# Patient Record
Sex: FEMALE | Race: WHITE | NOT HISPANIC OR LATINO | URBAN - METROPOLITAN AREA
[De-identification: names, ages, dates, MRNs, and addresses within clinical notes are randomized per-mention and may not be internally consistent; named-entity substitution may affect disease eponyms.]

---

## 2017-02-17 ENCOUNTER — INPATIENT (INPATIENT)
Facility: HOSPITAL | Age: 82
LOS: 3 days | Discharge: ANOTHER HOSPITAL | End: 2017-02-21
Attending: PHYSICAL MEDICINE & REHABILITATION

## 2017-02-21 ENCOUNTER — INPATIENT (INPATIENT)
Facility: HOSPITAL | Age: 82
LOS: 5 days | Discharge: NURSING, REHAB OR LONG-TERM CARE | End: 2017-02-27
Attending: INTERNAL MEDICINE

## 2017-02-27 ENCOUNTER — INPATIENT (INPATIENT)
Facility: HOSPITAL | Age: 82
LOS: 8 days | Discharge: HOME HEALTH | End: 2017-03-08
Attending: PHYSICAL MEDICINE & REHABILITATION

## 2017-03-08 ENCOUNTER — MESSAGE (OUTPATIENT)
Age: 82
End: 2017-03-08

## 2017-03-21 ENCOUNTER — APPOINTMENT (OUTPATIENT)
Dept: HEMATOLOGY ONCOLOGY | Facility: CLINIC | Age: 82
End: 2017-03-21

## 2017-03-28 ENCOUNTER — RESULT REVIEW (OUTPATIENT)
Age: 82
End: 2017-03-28

## 2017-03-28 ENCOUNTER — APPOINTMENT (OUTPATIENT)
Dept: HEMATOLOGY ONCOLOGY | Facility: CLINIC | Age: 82
End: 2017-03-28

## 2017-03-28 VITALS
RESPIRATION RATE: 18 BRPM | BODY MASS INDEX: 27.6 KG/M2 | DIASTOLIC BLOOD PRESSURE: 63 MMHG | SYSTOLIC BLOOD PRESSURE: 164 MMHG | OXYGEN SATURATION: 97 % | TEMPERATURE: 95.9 F | HEIGHT: 62 IN | WEIGHT: 150 LBS | HEART RATE: 73 BPM

## 2017-03-28 DIAGNOSIS — Z78.9 OTHER SPECIFIED HEALTH STATUS: ICD-10-CM

## 2017-03-28 LAB
BASOPHILS # BLD: 0.02 TH/MM3
BASOPHILS NFR BLD: 0.3 %
EOSINOPHIL # BLD: 0.07 TH/MM3
EOSINOPHIL NFR BLD: 0.9 %
ERYTHROCYTE [DISTWIDTH] IN BLOOD BY AUTOMATED COUNT: 14.3 %
GRANULOCYTES # BLD: 5.02 TH/MM3
GRANULOCYTES NFR BLD: 68.1 %
HCT VFR BLD AUTO: 31.2 %
HGB BLD-MCNC: 9.6 G/DL
IMM GRANULOCYTES # BLD: 0.04 TH/MM3
IMM GRANULOCYTES NFR BLD: 0.5 %
LYMPHOCYTES # BLD: 1.14 TH/MM3
LYMPHOCYTES NFR BLD: 15.5 %
MCH RBC QN AUTO: 29.1 PG
MCHC RBC AUTO-ENTMCNC: 30.8 G/DL
MCV RBC AUTO: 94.5 FL
MONOCYTES # BLD: 1.08 TH/MM3
MONOCYTES NFR BLD: 14.7 %
PLATELET # BLD: 305 TH/MM3
PMV BLD AUTO: 9.6 FL
RBC # BLD AUTO: 3.3 MIL/MM3
WBC # BLD: 7.37 TH/MM3

## 2017-03-29 LAB
ANION GAP SERPL CALC-SCNC: 16 MEQ/L
BUN SERPL-MCNC: 34 MG/DL
BUN/CREAT SERPL: 21.1 %
CALCIUM SERPL-MCNC: 9.5 MG/DL
CHLORIDE SERPL-SCNC: 101 MEQ/L
CO2 SERPL-SCNC: 20 MEQ/L
CREAT SERPL-MCNC: 1.61 MG/DL
GFR SERPL CREATININE-BSD FRML MDRD: 31
GLUCOSE SERPL-MCNC: 57 MG/DL
POTASSIUM SERPL-SCNC: 4.7 MMOL/L
SODIUM SERPL-SCNC: 137 MEQ/L

## 2017-04-25 ENCOUNTER — APPOINTMENT (OUTPATIENT)
Dept: HEMATOLOGY ONCOLOGY | Facility: CLINIC | Age: 82
End: 2017-04-25

## 2017-04-25 VITALS
RESPIRATION RATE: 14 BRPM | BODY MASS INDEX: 28.71 KG/M2 | WEIGHT: 156 LBS | DIASTOLIC BLOOD PRESSURE: 65 MMHG | TEMPERATURE: 95.5 F | SYSTOLIC BLOOD PRESSURE: 137 MMHG | HEIGHT: 62 IN | HEART RATE: 68 BPM

## 2017-04-25 LAB
BASOPHILS # BLD: 0.04 TH/MM3
BASOPHILS NFR BLD: 0.5 %
EOSINOPHIL # BLD: 0.17 TH/MM3
EOSINOPHIL NFR BLD: 1.9 %
ERYTHROCYTE [DISTWIDTH] IN BLOOD BY AUTOMATED COUNT: 14.9 %
GRANULOCYTES # BLD: 5.87 TH/MM3
GRANULOCYTES NFR BLD: 67 %
HCT VFR BLD AUTO: 35.5 %
HGB BLD-MCNC: 10.9 G/DL
IMM GRANULOCYTES # BLD: 0.04 TH/MM3
IMM GRANULOCYTES NFR BLD: 0.5 %
LYMPHOCYTES # BLD: 1.39 TH/MM3
LYMPHOCYTES NFR BLD: 15.9 %
MCH RBC QN AUTO: 28.5 PG
MCHC RBC AUTO-ENTMCNC: 30.7 G/DL
MCV RBC AUTO: 92.9 FL
MONOCYTES # BLD: 1.24 TH/MM3
MONOCYTES NFR BLD: 14.2 %
PLATELET # BLD: 244 TH/MM3
PMV BLD AUTO: 9.9 FL
RBC # BLD AUTO: 3.82 MIL/MM3
WBC # BLD: 8.75 TH/MM3

## 2017-05-30 ENCOUNTER — APPOINTMENT (OUTPATIENT)
Dept: HEMATOLOGY ONCOLOGY | Facility: CLINIC | Age: 82
End: 2017-05-30

## 2017-05-30 ENCOUNTER — OUTPATIENT (OUTPATIENT)
Dept: OUTPATIENT SERVICES | Facility: HOSPITAL | Age: 82
LOS: 1 days | Discharge: HOME | End: 2017-05-30

## 2017-05-30 VITALS
WEIGHT: 153 LBS | HEART RATE: 59 BPM | HEIGHT: 62 IN | RESPIRATION RATE: 14 BRPM | DIASTOLIC BLOOD PRESSURE: 73 MMHG | OXYGEN SATURATION: 97 % | BODY MASS INDEX: 28.16 KG/M2 | SYSTOLIC BLOOD PRESSURE: 138 MMHG | TEMPERATURE: 96 F

## 2017-06-28 DIAGNOSIS — Z48.812 ENCOUNTER FOR SURGICAL AFTERCARE FOLLOWING SURGERY ON THE CIRCULATORY SYSTEM: ICD-10-CM

## 2017-06-28 DIAGNOSIS — G89.29 OTHER CHRONIC PAIN: ICD-10-CM

## 2017-06-28 DIAGNOSIS — R07.9 CHEST PAIN, UNSPECIFIED: ICD-10-CM

## 2017-06-28 DIAGNOSIS — G30.9 ALZHEIMER'S DISEASE, UNSPECIFIED: ICD-10-CM

## 2017-06-28 DIAGNOSIS — N17.9 ACUTE KIDNEY FAILURE, UNSPECIFIED: ICD-10-CM

## 2017-06-28 DIAGNOSIS — R53.81 OTHER MALAISE: ICD-10-CM

## 2017-06-28 DIAGNOSIS — I71.00 DISSECTION OF UNSPECIFIED SITE OF AORTA: ICD-10-CM

## 2017-06-28 DIAGNOSIS — I10 ESSENTIAL (PRIMARY) HYPERTENSION: ICD-10-CM

## 2017-06-28 DIAGNOSIS — N18.3 CHRONIC KIDNEY DISEASE, STAGE 3 (MODERATE): ICD-10-CM

## 2017-06-28 DIAGNOSIS — Z86.718 PERSONAL HISTORY OF OTHER VENOUS THROMBOSIS AND EMBOLISM: ICD-10-CM

## 2017-06-28 DIAGNOSIS — I48.0 PAROXYSMAL ATRIAL FIBRILLATION: ICD-10-CM

## 2017-06-28 DIAGNOSIS — E87.5 HYPERKALEMIA: ICD-10-CM

## 2017-06-28 DIAGNOSIS — M48.00 SPINAL STENOSIS, SITE UNSPECIFIED: ICD-10-CM

## 2017-06-28 DIAGNOSIS — Z79.01 LONG TERM (CURRENT) USE OF ANTICOAGULANTS: ICD-10-CM

## 2017-06-28 DIAGNOSIS — I12.9 HYPERTENSIVE CHRONIC KIDNEY DISEASE WITH STAGE 1 THROUGH STAGE 4 CHRONIC KIDNEY DISEASE, OR UNSPECIFIED CHRONIC KIDNEY DISEASE: ICD-10-CM

## 2017-06-28 DIAGNOSIS — M19.90 UNSPECIFIED OSTEOARTHRITIS, UNSPECIFIED SITE: ICD-10-CM

## 2017-06-28 DIAGNOSIS — D68.59 OTHER PRIMARY THROMBOPHILIA: ICD-10-CM

## 2017-06-28 DIAGNOSIS — F02.80 DEMENTIA IN OTHER DISEASES CLASSIFIED ELSEWHERE, UNSPECIFIED SEVERITY, WITHOUT BEHAVIORAL DISTURBANCE, PSYCHOTIC DISTURBANCE, MOOD DISTURBANCE, AND ANXIETY: ICD-10-CM

## 2017-06-28 DIAGNOSIS — I82.432 ACUTE EMBOLISM AND THROMBOSIS OF LEFT POPLITEAL VEIN: ICD-10-CM

## 2017-07-07 ENCOUNTER — OUTPATIENT (OUTPATIENT)
Dept: OUTPATIENT SERVICES | Facility: HOSPITAL | Age: 82
LOS: 1 days | Discharge: HOME | End: 2017-07-07

## 2017-07-07 DIAGNOSIS — I71.01 DISSECTION OF THORACIC AORTA: ICD-10-CM

## 2017-07-07 DIAGNOSIS — I16.0 HYPERTENSIVE URGENCY: ICD-10-CM

## 2017-07-07 DIAGNOSIS — R30.0 DYSURIA: ICD-10-CM

## 2017-07-07 DIAGNOSIS — I10 ESSENTIAL (PRIMARY) HYPERTENSION: ICD-10-CM

## 2017-07-07 DIAGNOSIS — I82.409 ACUTE EMBOLISM AND THROMBOSIS OF UNSPECIFIED DEEP VEINS OF UNSPECIFIED LOWER EXTREMITY: ICD-10-CM

## 2017-07-10 ENCOUNTER — OUTPATIENT (OUTPATIENT)
Dept: OUTPATIENT SERVICES | Facility: HOSPITAL | Age: 82
LOS: 1 days | Discharge: HOME | End: 2017-07-10

## 2017-07-10 DIAGNOSIS — I25.10 ATHEROSCLEROTIC HEART DISEASE OF NATIVE CORONARY ARTERY WITHOUT ANGINA PECTORIS: ICD-10-CM

## 2017-07-10 DIAGNOSIS — I71.01 DISSECTION OF THORACIC AORTA: ICD-10-CM

## 2017-07-10 DIAGNOSIS — I82.409 ACUTE EMBOLISM AND THROMBOSIS OF UNSPECIFIED DEEP VEINS OF UNSPECIFIED LOWER EXTREMITY: ICD-10-CM

## 2017-07-10 DIAGNOSIS — I16.0 HYPERTENSIVE URGENCY: ICD-10-CM

## 2017-07-10 DIAGNOSIS — I10 ESSENTIAL (PRIMARY) HYPERTENSION: ICD-10-CM

## 2017-08-01 ENCOUNTER — OUTPATIENT (OUTPATIENT)
Dept: OUTPATIENT SERVICES | Facility: HOSPITAL | Age: 82
LOS: 1 days | Discharge: HOME | End: 2017-08-01

## 2017-08-01 ENCOUNTER — APPOINTMENT (OUTPATIENT)
Dept: HEMATOLOGY ONCOLOGY | Facility: CLINIC | Age: 82
End: 2017-08-01
Payer: MEDICARE

## 2017-08-01 VITALS
HEART RATE: 59 BPM | TEMPERATURE: 95.8 F | RESPIRATION RATE: 16 BRPM | WEIGHT: 151.99 LBS | BODY MASS INDEX: 27.97 KG/M2 | HEIGHT: 62 IN | OXYGEN SATURATION: 99 % | DIASTOLIC BLOOD PRESSURE: 96 MMHG | SYSTOLIC BLOOD PRESSURE: 153 MMHG

## 2017-08-01 DIAGNOSIS — I10 ESSENTIAL (PRIMARY) HYPERTENSION: ICD-10-CM

## 2017-08-01 DIAGNOSIS — I16.0 HYPERTENSIVE URGENCY: ICD-10-CM

## 2017-08-01 DIAGNOSIS — I25.10 ATHEROSCLEROTIC HEART DISEASE OF NATIVE CORONARY ARTERY WITHOUT ANGINA PECTORIS: ICD-10-CM

## 2017-08-01 DIAGNOSIS — I82.432 ACUTE EMBOLISM AND THROMBOSIS OF LEFT POPLITEAL VEIN: ICD-10-CM

## 2017-08-01 DIAGNOSIS — I82.409 ACUTE EMBOLISM AND THROMBOSIS OF UNSPECIFIED DEEP VEINS OF UNSPECIFIED LOWER EXTREMITY: ICD-10-CM

## 2017-08-01 DIAGNOSIS — I71.01 DISSECTION OF THORACIC AORTA: ICD-10-CM

## 2017-08-01 PROCEDURE — 99215 OFFICE O/P EST HI 40 MIN: CPT

## 2017-08-02 DIAGNOSIS — I82.531 CHRONIC EMBOLISM AND THROMBOSIS OF RIGHT POPLITEAL VEIN: ICD-10-CM

## 2017-08-17 DIAGNOSIS — M54.5 LOW BACK PAIN: ICD-10-CM

## 2017-08-17 DIAGNOSIS — F02.80 DEMENTIA IN OTHER DISEASES CLASSIFIED ELSEWHERE, UNSPECIFIED SEVERITY, WITHOUT BEHAVIORAL DISTURBANCE, PSYCHOTIC DISTURBANCE, MOOD DISTURBANCE, AND ANXIETY: ICD-10-CM

## 2017-08-17 DIAGNOSIS — G30.9 ALZHEIMER'S DISEASE, UNSPECIFIED: ICD-10-CM

## 2017-08-17 DIAGNOSIS — I95.89 OTHER HYPOTENSION: ICD-10-CM

## 2017-08-17 DIAGNOSIS — K59.00 CONSTIPATION, UNSPECIFIED: ICD-10-CM

## 2017-08-17 DIAGNOSIS — R53.81 OTHER MALAISE: ICD-10-CM

## 2017-08-17 DIAGNOSIS — E78.5 HYPERLIPIDEMIA, UNSPECIFIED: ICD-10-CM

## 2017-08-17 DIAGNOSIS — M48.00 SPINAL STENOSIS, SITE UNSPECIFIED: ICD-10-CM

## 2017-08-17 DIAGNOSIS — N18.3 CHRONIC KIDNEY DISEASE, STAGE 3 (MODERATE): ICD-10-CM

## 2017-08-17 DIAGNOSIS — N39.0 URINARY TRACT INFECTION, SITE NOT SPECIFIED: ICD-10-CM

## 2017-08-17 DIAGNOSIS — I13.0 HYPERTENSIVE HEART AND CHRONIC KIDNEY DISEASE WITH HEART FAILURE AND STAGE 1 THROUGH STAGE 4 CHRONIC KIDNEY DISEASE, OR UNSPECIFIED CHRONIC KIDNEY DISEASE: ICD-10-CM

## 2017-08-17 DIAGNOSIS — F32.9 MAJOR DEPRESSIVE DISORDER, SINGLE EPISODE, UNSPECIFIED: ICD-10-CM

## 2017-08-17 DIAGNOSIS — G89.29 OTHER CHRONIC PAIN: ICD-10-CM

## 2017-08-17 DIAGNOSIS — M19.90 UNSPECIFIED OSTEOARTHRITIS, UNSPECIFIED SITE: ICD-10-CM

## 2017-08-17 DIAGNOSIS — I50.42 CHRONIC COMBINED SYSTOLIC (CONGESTIVE) AND DIASTOLIC (CONGESTIVE) HEART FAILURE: ICD-10-CM

## 2017-08-17 DIAGNOSIS — I71.00 DISSECTION OF UNSPECIFIED SITE OF AORTA: ICD-10-CM

## 2017-08-17 DIAGNOSIS — D64.9 ANEMIA, UNSPECIFIED: ICD-10-CM

## 2017-09-17 ENCOUNTER — INPATIENT (INPATIENT)
Facility: HOSPITAL | Age: 82
LOS: 1 days | Discharge: HOME | End: 2017-09-19
Attending: INTERNAL MEDICINE | Admitting: INTERNAL MEDICINE

## 2017-09-17 DIAGNOSIS — I82.409 ACUTE EMBOLISM AND THROMBOSIS OF UNSPECIFIED DEEP VEINS OF UNSPECIFIED LOWER EXTREMITY: ICD-10-CM

## 2017-09-17 DIAGNOSIS — I10 ESSENTIAL (PRIMARY) HYPERTENSION: ICD-10-CM

## 2017-09-17 DIAGNOSIS — I16.0 HYPERTENSIVE URGENCY: ICD-10-CM

## 2017-09-17 DIAGNOSIS — I71.01 DISSECTION OF THORACIC AORTA: ICD-10-CM

## 2017-09-22 DIAGNOSIS — M48.00 SPINAL STENOSIS, SITE UNSPECIFIED: ICD-10-CM

## 2017-09-22 DIAGNOSIS — I13.0 HYPERTENSIVE HEART AND CHRONIC KIDNEY DISEASE WITH HEART FAILURE AND STAGE 1 THROUGH STAGE 4 CHRONIC KIDNEY DISEASE, OR UNSPECIFIED CHRONIC KIDNEY DISEASE: ICD-10-CM

## 2017-09-22 DIAGNOSIS — F41.9 ANXIETY DISORDER, UNSPECIFIED: ICD-10-CM

## 2017-09-22 DIAGNOSIS — I10 ESSENTIAL (PRIMARY) HYPERTENSION: ICD-10-CM

## 2017-09-22 DIAGNOSIS — M54.9 DORSALGIA, UNSPECIFIED: ICD-10-CM

## 2017-09-22 DIAGNOSIS — F02.80 DEMENTIA IN OTHER DISEASES CLASSIFIED ELSEWHERE, UNSPECIFIED SEVERITY, WITHOUT BEHAVIORAL DISTURBANCE, PSYCHOTIC DISTURBANCE, MOOD DISTURBANCE, AND ANXIETY: ICD-10-CM

## 2017-09-22 DIAGNOSIS — Z79.01 LONG TERM (CURRENT) USE OF ANTICOAGULANTS: ICD-10-CM

## 2017-09-22 DIAGNOSIS — G30.9 ALZHEIMER'S DISEASE, UNSPECIFIED: ICD-10-CM

## 2017-09-22 DIAGNOSIS — I16.0 HYPERTENSIVE URGENCY: ICD-10-CM

## 2017-09-22 DIAGNOSIS — G89.29 OTHER CHRONIC PAIN: ICD-10-CM

## 2017-09-22 DIAGNOSIS — I71.01 DISSECTION OF THORACIC AORTA: ICD-10-CM

## 2017-09-22 DIAGNOSIS — D80.8 OTHER IMMUNODEFICIENCIES WITH PREDOMINANTLY ANTIBODY DEFECTS: ICD-10-CM

## 2017-09-22 DIAGNOSIS — N18.3 CHRONIC KIDNEY DISEASE, STAGE 3 (MODERATE): ICD-10-CM

## 2017-09-22 DIAGNOSIS — Z86.718 PERSONAL HISTORY OF OTHER VENOUS THROMBOSIS AND EMBOLISM: ICD-10-CM

## 2017-09-22 DIAGNOSIS — I44.0 ATRIOVENTRICULAR BLOCK, FIRST DEGREE: ICD-10-CM

## 2017-09-22 DIAGNOSIS — M19.90 UNSPECIFIED OSTEOARTHRITIS, UNSPECIFIED SITE: ICD-10-CM

## 2017-09-22 DIAGNOSIS — E78.5 HYPERLIPIDEMIA, UNSPECIFIED: ICD-10-CM

## 2017-09-22 DIAGNOSIS — I50.32 CHRONIC DIASTOLIC (CONGESTIVE) HEART FAILURE: ICD-10-CM

## 2017-09-28 DIAGNOSIS — Z53.29 PROCEDURE AND TREATMENT NOT CARRIED OUT BECAUSE OF PATIENT'S DECISION FOR OTHER REASONS: ICD-10-CM

## 2017-11-06 ENCOUNTER — OUTPATIENT (OUTPATIENT)
Dept: OUTPATIENT SERVICES | Facility: HOSPITAL | Age: 82
LOS: 1 days | Discharge: HOME | End: 2017-11-06

## 2017-11-06 DIAGNOSIS — I82.409 ACUTE EMBOLISM AND THROMBOSIS OF UNSPECIFIED DEEP VEINS OF UNSPECIFIED LOWER EXTREMITY: ICD-10-CM

## 2017-11-06 DIAGNOSIS — R52 PAIN, UNSPECIFIED: ICD-10-CM

## 2017-11-06 DIAGNOSIS — I10 ESSENTIAL (PRIMARY) HYPERTENSION: ICD-10-CM

## 2017-11-06 DIAGNOSIS — I16.0 HYPERTENSIVE URGENCY: ICD-10-CM

## 2017-11-06 DIAGNOSIS — I71.01 DISSECTION OF THORACIC AORTA: ICD-10-CM

## 2017-11-07 ENCOUNTER — APPOINTMENT (OUTPATIENT)
Dept: HEMATOLOGY ONCOLOGY | Facility: CLINIC | Age: 82
End: 2017-11-07
Payer: MEDICARE

## 2017-11-07 ENCOUNTER — RESULT REVIEW (OUTPATIENT)
Age: 82
End: 2017-11-07

## 2017-11-07 ENCOUNTER — OUTPATIENT (OUTPATIENT)
Dept: OUTPATIENT SERVICES | Facility: HOSPITAL | Age: 82
LOS: 1 days | Discharge: HOME | End: 2017-11-07

## 2017-11-07 VITALS
HEART RATE: 68 BPM | DIASTOLIC BLOOD PRESSURE: 52 MMHG | RESPIRATION RATE: 14 BRPM | BODY MASS INDEX: 27.97 KG/M2 | WEIGHT: 152 LBS | TEMPERATURE: 95.5 F | SYSTOLIC BLOOD PRESSURE: 104 MMHG | HEIGHT: 62 IN | OXYGEN SATURATION: 99 %

## 2017-11-07 PROCEDURE — 99214 OFFICE O/P EST MOD 30 MIN: CPT

## 2017-11-08 DIAGNOSIS — I82.531 CHRONIC EMBOLISM AND THROMBOSIS OF RIGHT POPLITEAL VEIN: ICD-10-CM

## 2017-12-21 ENCOUNTER — OUTPATIENT (OUTPATIENT)
Dept: OUTPATIENT SERVICES | Facility: HOSPITAL | Age: 82
LOS: 1 days | Discharge: HOME | End: 2017-12-21

## 2017-12-21 DIAGNOSIS — I71.01 DISSECTION OF THORACIC AORTA: ICD-10-CM

## 2017-12-21 DIAGNOSIS — I10 ESSENTIAL (PRIMARY) HYPERTENSION: ICD-10-CM

## 2017-12-21 DIAGNOSIS — I82.409 ACUTE EMBOLISM AND THROMBOSIS OF UNSPECIFIED DEEP VEINS OF UNSPECIFIED LOWER EXTREMITY: ICD-10-CM

## 2017-12-21 DIAGNOSIS — Z79.899 OTHER LONG TERM (CURRENT) DRUG THERAPY: ICD-10-CM

## 2017-12-21 DIAGNOSIS — I16.0 HYPERTENSIVE URGENCY: ICD-10-CM

## 2018-01-15 ENCOUNTER — OUTPATIENT (OUTPATIENT)
Dept: OUTPATIENT SERVICES | Facility: HOSPITAL | Age: 83
LOS: 1 days | Discharge: HOME | End: 2018-01-15

## 2018-01-15 DIAGNOSIS — R60.9 EDEMA, UNSPECIFIED: ICD-10-CM

## 2018-01-15 DIAGNOSIS — I16.0 HYPERTENSIVE URGENCY: ICD-10-CM

## 2018-01-15 DIAGNOSIS — I71.01 DISSECTION OF THORACIC AORTA: ICD-10-CM

## 2018-01-15 DIAGNOSIS — I10 ESSENTIAL (PRIMARY) HYPERTENSION: ICD-10-CM

## 2018-01-15 DIAGNOSIS — E78.4 OTHER HYPERLIPIDEMIA: ICD-10-CM

## 2018-01-15 DIAGNOSIS — I82.409 ACUTE EMBOLISM AND THROMBOSIS OF UNSPECIFIED DEEP VEINS OF UNSPECIFIED LOWER EXTREMITY: ICD-10-CM

## 2018-01-30 DIAGNOSIS — I16.0 HYPERTENSIVE URGENCY: ICD-10-CM

## 2018-01-30 DIAGNOSIS — I10 ESSENTIAL (PRIMARY) HYPERTENSION: ICD-10-CM

## 2018-01-30 DIAGNOSIS — I82.409 ACUTE EMBOLISM AND THROMBOSIS OF UNSPECIFIED DEEP VEINS OF UNSPECIFIED LOWER EXTREMITY: ICD-10-CM

## 2018-01-30 DIAGNOSIS — I71.01 DISSECTION OF THORACIC AORTA: ICD-10-CM

## 2018-02-13 ENCOUNTER — APPOINTMENT (OUTPATIENT)
Dept: HEMATOLOGY ONCOLOGY | Facility: CLINIC | Age: 83
End: 2018-02-13
Payer: MEDICARE

## 2018-02-13 ENCOUNTER — OUTPATIENT (OUTPATIENT)
Dept: OUTPATIENT SERVICES | Facility: HOSPITAL | Age: 83
LOS: 1 days | Discharge: HOME | End: 2018-02-13

## 2018-02-13 VITALS
SYSTOLIC BLOOD PRESSURE: 170 MMHG | WEIGHT: 157.98 LBS | DIASTOLIC BLOOD PRESSURE: 60 MMHG | HEIGHT: 62 IN | TEMPERATURE: 96.8 F | OXYGEN SATURATION: 98 % | BODY MASS INDEX: 29.07 KG/M2 | RESPIRATION RATE: 12 BRPM | HEART RATE: 70 BPM

## 2018-02-13 VITALS — DIASTOLIC BLOOD PRESSURE: 77 MMHG | SYSTOLIC BLOOD PRESSURE: 158 MMHG

## 2018-02-13 DIAGNOSIS — I82.531 CHRONIC EMBOLISM AND THROMBOSIS OF RIGHT POPLITEAL VEIN: ICD-10-CM

## 2018-02-13 DIAGNOSIS — I71.00 DISSECTION OF UNSPECIFIED SITE OF AORTA: ICD-10-CM

## 2018-02-13 PROCEDURE — 99215 OFFICE O/P EST HI 40 MIN: CPT

## 2018-02-14 DIAGNOSIS — I82.531 CHRONIC EMBOLISM AND THROMBOSIS OF RIGHT POPLITEAL VEIN: ICD-10-CM

## 2018-05-10 ENCOUNTER — OUTPATIENT (OUTPATIENT)
Dept: OUTPATIENT SERVICES | Facility: HOSPITAL | Age: 83
LOS: 1 days | Discharge: HOME | End: 2018-05-10

## 2018-05-10 DIAGNOSIS — I10 ESSENTIAL (PRIMARY) HYPERTENSION: ICD-10-CM

## 2018-05-10 DIAGNOSIS — E78.4 OTHER HYPERLIPIDEMIA: ICD-10-CM

## 2018-05-10 DIAGNOSIS — N18.3 CHRONIC KIDNEY DISEASE, STAGE 3 (MODERATE): ICD-10-CM

## 2018-05-13 ENCOUNTER — EMERGENCY (EMERGENCY)
Facility: HOSPITAL | Age: 83
LOS: 0 days | Discharge: HOME | End: 2018-05-13
Attending: EMERGENCY MEDICINE | Admitting: EMERGENCY MEDICINE

## 2018-05-13 VITALS
TEMPERATURE: 97 F | OXYGEN SATURATION: 97 % | RESPIRATION RATE: 18 BRPM | HEART RATE: 71 BPM | DIASTOLIC BLOOD PRESSURE: 78 MMHG | SYSTOLIC BLOOD PRESSURE: 143 MMHG

## 2018-05-13 VITALS
HEART RATE: 75 BPM | RESPIRATION RATE: 20 BRPM | SYSTOLIC BLOOD PRESSURE: 201 MMHG | DIASTOLIC BLOOD PRESSURE: 84 MMHG | TEMPERATURE: 98 F | OXYGEN SATURATION: 95 %

## 2018-05-13 DIAGNOSIS — R04.0 EPISTAXIS: ICD-10-CM

## 2018-05-13 DIAGNOSIS — I10 ESSENTIAL (PRIMARY) HYPERTENSION: ICD-10-CM

## 2018-05-13 RX ORDER — ACETAMINOPHEN 500 MG
650 TABLET ORAL ONCE
Qty: 0 | Refills: 0 | Status: COMPLETED | OUTPATIENT
Start: 2018-05-13 | End: 2018-05-13

## 2018-05-13 RX ORDER — TRANEXAMIC ACID 100 MG/ML
1 INJECTION, SOLUTION INTRAVENOUS ONCE
Qty: 0 | Refills: 0 | Status: COMPLETED | OUTPATIENT
Start: 2018-05-13 | End: 2018-05-13

## 2018-05-13 RX ADMIN — TRANEXAMIC ACID 1 APPLICATION(S): 100 INJECTION, SOLUTION INTRAVENOUS at 15:38

## 2018-05-13 RX ADMIN — Medication 650 MILLIGRAM(S): at 15:01

## 2018-05-13 NOTE — ED PROVIDER NOTE - ENMT, MLM
Airway patent, Right nare normal mucosa no bleeding, Left nare there is bleeding unable to visualize the site. Mouth with normal mucosa. Throat has no vesicles, no oropharyngeal exudates and uvula is midline.

## 2018-05-13 NOTE — ED PROVIDER NOTE - OBJECTIVE STATEMENT
86 yo F with hx of HTN, DVT on eliquis, aortic dissection medically managed, presents for evaluation of nose bleed from left nare, onset at 1230 hours, with no other symptoms. No dizziness, no chest pain, no back pain, no headache, no abdominal pain, no fever, no chills, no lightheadedness, no n/v/d. Patient and family states this has happened before and it stops. This time they tried ice and pressure with no resolution. Denies any other symptoms.

## 2018-05-13 NOTE — ED PROVIDER NOTE - ATTENDING CONTRIBUTION TO CARE
85yoF with h/o HTN and DVT on Eliquis, p/w epistaxis since this AM, has been having digital manipulation, took her AM BP meds. On exam, afebrile, hemodynamically stable, saturating well, NAD, well appearing, head NCAT, noted L nare slow epistaxis, EOMI grossly, MM dry, breathing comfortably on RA, CN's 3-12 grossly intact, skin warm, dry. Continued bleeding despite prolonged manual pressure, applied rhinorocket with TXA. 85yoF with h/o HTN and DVT on Eliquis, p/w epistaxis since this AM, has been having digital manipulation, took her AM BP meds. On exam, afebrile, hemodynamically stable, saturating well, NAD, well appearing, head NCAT, noted L nare slow epistaxis, EOMI grossly, MM dry, breathing comfortably on RA, CN's 3-12 grossly intact, skin warm, dry. Continued bleeding despite prolonged manual pressure, applied rhinorocket with TXA, removed after 2 hours with no rebleed, no oropharynx bleeding. Well appearing, hemodynamically stable. Discharged with instructions for PMD f/u.

## 2018-05-13 NOTE — ED PROVIDER NOTE - ENMT NEGATIVE STATEMENT, MLM
Ears: no ear pain and no hearing problems.Nose: + epistaxis from left nare, no nasal congestion and no nasal drainage.Mouth/Throat: no dysphagia, no hoarseness and no throat pain.Neck: no lumps, no pain, no stiffness and no swollen glands.

## 2018-05-17 ENCOUNTER — APPOINTMENT (OUTPATIENT)
Dept: OTOLARYNGOLOGY | Facility: CLINIC | Age: 83
End: 2018-05-17
Payer: MEDICARE

## 2018-05-17 DIAGNOSIS — H61.20 IMPACTED CERUMEN, UNSPECIFIED EAR: ICD-10-CM

## 2018-05-17 DIAGNOSIS — R04.0 EPISTAXIS: ICD-10-CM

## 2018-05-17 PROCEDURE — 99202 OFFICE O/P NEW SF 15 MIN: CPT | Mod: 25

## 2018-05-17 PROCEDURE — 69210 REMOVE IMPACTED EAR WAX UNI: CPT

## 2018-05-17 PROCEDURE — 99203 OFFICE O/P NEW LOW 30 MIN: CPT | Mod: 25

## 2018-05-17 PROCEDURE — 31231 NASAL ENDOSCOPY DX: CPT

## 2018-05-22 NOTE — ED ADULT NURSE NOTE - GASTROINTESTINAL ASSESSMENT
DISPLAY PLAN FREE TEXT DISPLAY PLAN FREE TEXT DISPLAY PLAN FREE TEXT DISPLAY PLAN FREE TEXT DISPLAY PLAN FREE TEXT WDL

## 2018-05-29 ENCOUNTER — MESSAGE (OUTPATIENT)
Age: 83
End: 2018-05-29

## 2018-06-01 ENCOUNTER — INPATIENT (INPATIENT)
Facility: HOSPITAL | Age: 83
LOS: 20 days | Discharge: HOME HEALTH | End: 2018-06-22
Attending: INTERNAL MEDICINE | Admitting: INTERNAL MEDICINE
Payer: COMMERCIAL

## 2018-06-01 VITALS
DIASTOLIC BLOOD PRESSURE: 87 MMHG | TEMPERATURE: 98 F | RESPIRATION RATE: 18 BRPM | HEART RATE: 76 BPM | OXYGEN SATURATION: 97 % | SYSTOLIC BLOOD PRESSURE: 170 MMHG

## 2018-06-01 DIAGNOSIS — Z86.718 PERSONAL HISTORY OF OTHER VENOUS THROMBOSIS AND EMBOLISM: ICD-10-CM

## 2018-06-01 DIAGNOSIS — I10 ESSENTIAL (PRIMARY) HYPERTENSION: ICD-10-CM

## 2018-06-01 DIAGNOSIS — E89.0 POSTPROCEDURAL HYPOTHYROIDISM: Chronic | ICD-10-CM

## 2018-06-01 DIAGNOSIS — E78.5 HYPERLIPIDEMIA, UNSPECIFIED: ICD-10-CM

## 2018-06-01 DIAGNOSIS — Z98.890 OTHER SPECIFIED POSTPROCEDURAL STATES: Chronic | ICD-10-CM

## 2018-06-01 DIAGNOSIS — Z87.39 PERSONAL HISTORY OF OTHER DISEASES OF THE MUSCULOSKELETAL SYSTEM AND CONNECTIVE TISSUE: Chronic | ICD-10-CM

## 2018-06-01 DIAGNOSIS — F03.90 UNSPECIFIED DEMENTIA WITHOUT BEHAVIORAL DISTURBANCE: ICD-10-CM

## 2018-06-01 DIAGNOSIS — I71.01 DISSECTION OF THORACIC AORTA: ICD-10-CM

## 2018-06-01 LAB
ALBUMIN SERPL ELPH-MCNC: 4 G/DL — SIGNIFICANT CHANGE UP (ref 3.5–5.2)
ALP SERPL-CCNC: 78 U/L — SIGNIFICANT CHANGE UP (ref 30–115)
ALT FLD-CCNC: 11 U/L — SIGNIFICANT CHANGE UP (ref 0–41)
ANION GAP SERPL CALC-SCNC: 19 MMOL/L — HIGH (ref 7–14)
APTT BLD: 27.5 SEC — SIGNIFICANT CHANGE UP (ref 27–39.2)
AST SERPL-CCNC: 21 U/L — SIGNIFICANT CHANGE UP (ref 0–41)
BASOPHILS # BLD AUTO: 0.02 K/UL — SIGNIFICANT CHANGE UP (ref 0–0.2)
BASOPHILS NFR BLD AUTO: 0.2 % — SIGNIFICANT CHANGE UP (ref 0–1)
BILIRUB SERPL-MCNC: 0.3 MG/DL — SIGNIFICANT CHANGE UP (ref 0.2–1.2)
BUN SERPL-MCNC: 36 MG/DL — HIGH (ref 10–20)
CALCIUM SERPL-MCNC: 9 MG/DL — SIGNIFICANT CHANGE UP (ref 8.5–10.1)
CHLORIDE SERPL-SCNC: 100 MMOL/L — SIGNIFICANT CHANGE UP (ref 98–110)
CK MB CFR SERPL CALC: 4.2 NG/ML — SIGNIFICANT CHANGE UP (ref 0.6–6.3)
CK SERPL-CCNC: 36 U/L — SIGNIFICANT CHANGE UP (ref 0–225)
CK SERPL-CCNC: 41 U/L — SIGNIFICANT CHANGE UP (ref 0–225)
CO2 SERPL-SCNC: 20 MMOL/L — SIGNIFICANT CHANGE UP (ref 17–32)
CREAT SERPL-MCNC: 1.2 MG/DL — SIGNIFICANT CHANGE UP (ref 0.7–1.5)
EOSINOPHIL # BLD AUTO: 0.1 K/UL — SIGNIFICANT CHANGE UP (ref 0–0.7)
EOSINOPHIL NFR BLD AUTO: 1.1 % — SIGNIFICANT CHANGE UP (ref 0–8)
GLUCOSE SERPL-MCNC: 110 MG/DL — HIGH (ref 70–99)
HCT VFR BLD CALC: 35.8 % — LOW (ref 37–47)
HGB BLD-MCNC: 11.3 G/DL — LOW (ref 12–16)
IMM GRANULOCYTES NFR BLD AUTO: 0.4 % — HIGH (ref 0.1–0.3)
INR BLD: 1.42 RATIO — HIGH (ref 0.65–1.3)
LYMPHOCYTES # BLD AUTO: 0.86 K/UL — LOW (ref 1.2–3.4)
LYMPHOCYTES # BLD AUTO: 9.2 % — LOW (ref 20.5–51.1)
MAGNESIUM SERPL-MCNC: 1.9 MG/DL — SIGNIFICANT CHANGE UP (ref 1.8–2.4)
MCHC RBC-ENTMCNC: 28.3 PG — SIGNIFICANT CHANGE UP (ref 27–31)
MCHC RBC-ENTMCNC: 31.6 G/DL — LOW (ref 32–37)
MCV RBC AUTO: 89.7 FL — SIGNIFICANT CHANGE UP (ref 81–99)
MONOCYTES # BLD AUTO: 0.72 K/UL — HIGH (ref 0.1–0.6)
MONOCYTES NFR BLD AUTO: 7.7 % — SIGNIFICANT CHANGE UP (ref 1.7–9.3)
NEUTROPHILS # BLD AUTO: 7.61 K/UL — HIGH (ref 1.4–6.5)
NEUTROPHILS NFR BLD AUTO: 81.4 % — HIGH (ref 42.2–75.2)
NRBC # BLD: 0 /100 WBCS — SIGNIFICANT CHANGE UP (ref 0–0)
NT-PROBNP SERPL-SCNC: 616 PG/ML — HIGH (ref 0–300)
PLATELET # BLD AUTO: 259 K/UL — SIGNIFICANT CHANGE UP (ref 130–400)
POTASSIUM SERPL-MCNC: 5.2 MMOL/L — HIGH (ref 3.5–5)
POTASSIUM SERPL-SCNC: 5.2 MMOL/L — HIGH (ref 3.5–5)
PROT SERPL-MCNC: 6.6 G/DL — SIGNIFICANT CHANGE UP (ref 6–8)
PROTHROM AB SERPL-ACNC: 15.5 SEC — HIGH (ref 9.95–12.87)
RBC # BLD: 3.99 M/UL — LOW (ref 4.2–5.4)
RBC # FLD: 13 % — SIGNIFICANT CHANGE UP (ref 11.5–14.5)
SODIUM SERPL-SCNC: 139 MMOL/L — SIGNIFICANT CHANGE UP (ref 135–146)
TROPONIN T SERPL-MCNC: 0.12 NG/ML — CRITICAL HIGH
TROPONIN T SERPL-MCNC: <0.01 NG/ML — SIGNIFICANT CHANGE UP
WBC # BLD: 9.35 K/UL — SIGNIFICANT CHANGE UP (ref 4.8–10.8)
WBC # FLD AUTO: 9.35 K/UL — SIGNIFICANT CHANGE UP (ref 4.8–10.8)

## 2018-06-01 RX ORDER — FUROSEMIDE 40 MG
20 TABLET ORAL
Qty: 0 | Refills: 0 | Status: DISCONTINUED | OUTPATIENT
Start: 2018-06-01 | End: 2018-06-13

## 2018-06-01 RX ORDER — LABETALOL HCL 100 MG
0.5 TABLET ORAL
Qty: 200 | Refills: 0 | Status: DISCONTINUED | OUTPATIENT
Start: 2018-06-01 | End: 2018-06-01

## 2018-06-01 RX ORDER — ATORVASTATIN CALCIUM 80 MG/1
80 TABLET, FILM COATED ORAL AT BEDTIME
Qty: 0 | Refills: 0 | Status: DISCONTINUED | OUTPATIENT
Start: 2018-06-01 | End: 2018-06-15

## 2018-06-01 RX ORDER — ESMOLOL HCL 100MG/10ML
25 VIAL (ML) INTRAVENOUS
Qty: 2500 | Refills: 0 | Status: DISCONTINUED | OUTPATIENT
Start: 2018-06-01 | End: 2018-06-01

## 2018-06-01 RX ORDER — ATORVASTATIN CALCIUM 80 MG/1
1 TABLET, FILM COATED ORAL
Qty: 0 | Refills: 0 | COMMUNITY

## 2018-06-01 RX ORDER — FOLIC ACID 0.8 MG
1 TABLET ORAL DAILY
Qty: 0 | Refills: 0 | Status: DISCONTINUED | OUTPATIENT
Start: 2018-06-01 | End: 2018-06-22

## 2018-06-01 RX ORDER — LISINOPRIL 2.5 MG/1
10 TABLET ORAL EVERY 12 HOURS
Qty: 0 | Refills: 0 | Status: DISCONTINUED | OUTPATIENT
Start: 2018-06-01 | End: 2018-06-01

## 2018-06-01 RX ORDER — ACETAMINOPHEN 500 MG
975 TABLET ORAL ONCE
Qty: 0 | Refills: 0 | Status: COMPLETED | OUTPATIENT
Start: 2018-06-01 | End: 2018-06-01

## 2018-06-01 RX ORDER — ATORVASTATIN CALCIUM 80 MG/1
10 TABLET, FILM COATED ORAL AT BEDTIME
Qty: 0 | Refills: 0 | Status: DISCONTINUED | OUTPATIENT
Start: 2018-06-01 | End: 2018-06-01

## 2018-06-01 RX ORDER — SODIUM CHLORIDE 9 MG/ML
1000 INJECTION INTRAMUSCULAR; INTRAVENOUS; SUBCUTANEOUS ONCE
Qty: 0 | Refills: 0 | Status: COMPLETED | OUTPATIENT
Start: 2018-06-01 | End: 2018-06-01

## 2018-06-01 RX ORDER — MEMANTINE HYDROCHLORIDE 10 MG/1
10 TABLET ORAL AT BEDTIME
Qty: 0 | Refills: 0 | Status: DISCONTINUED | OUTPATIENT
Start: 2018-06-01 | End: 2018-06-22

## 2018-06-01 RX ORDER — FUROSEMIDE 40 MG
1 TABLET ORAL
Qty: 0 | Refills: 0 | COMMUNITY

## 2018-06-01 RX ORDER — SODIUM CHLORIDE 9 MG/ML
500 INJECTION INTRAMUSCULAR; INTRAVENOUS; SUBCUTANEOUS ONCE
Qty: 0 | Refills: 0 | Status: COMPLETED | OUTPATIENT
Start: 2018-06-01 | End: 2018-06-01

## 2018-06-01 RX ORDER — LAMOTRIGINE 25 MG/1
100 TABLET, ORALLY DISINTEGRATING ORAL DAILY
Qty: 0 | Refills: 0 | Status: DISCONTINUED | OUTPATIENT
Start: 2018-06-01 | End: 2018-06-22

## 2018-06-01 RX ORDER — MEMANTINE HYDROCHLORIDE 10 MG/1
5 TABLET ORAL DAILY
Qty: 0 | Refills: 0 | Status: DISCONTINUED | OUTPATIENT
Start: 2018-06-01 | End: 2018-06-22

## 2018-06-01 RX ORDER — LISINOPRIL 2.5 MG/1
1 TABLET ORAL
Qty: 0 | Refills: 0 | COMMUNITY

## 2018-06-01 RX ORDER — LAMOTRIGINE 25 MG/1
1 TABLET, ORALLY DISINTEGRATING ORAL
Qty: 0 | Refills: 0 | COMMUNITY

## 2018-06-01 RX ORDER — LABETALOL HCL 100 MG
3 TABLET ORAL
Qty: 1000 | Refills: 0 | Status: DISCONTINUED | OUTPATIENT
Start: 2018-06-01 | End: 2018-06-02

## 2018-06-01 RX ORDER — LABETALOL HCL 100 MG
0.5 TABLET ORAL
Qty: 1000 | Refills: 0 | Status: DISCONTINUED | OUTPATIENT
Start: 2018-06-01 | End: 2018-06-01

## 2018-06-01 RX ORDER — MEMANTINE HYDROCHLORIDE 10 MG/1
1 TABLET ORAL
Qty: 0 | Refills: 0 | COMMUNITY

## 2018-06-01 RX ADMIN — SODIUM CHLORIDE 1000 MILLILITER(S): 9 INJECTION INTRAMUSCULAR; INTRAVENOUS; SUBCUTANEOUS at 09:26

## 2018-06-01 RX ADMIN — Medication 30 MG/MIN: at 20:01

## 2018-06-01 RX ADMIN — Medication 975 MILLIGRAM(S): at 12:20

## 2018-06-01 RX ADMIN — ATORVASTATIN CALCIUM 80 MILLIGRAM(S): 80 TABLET, FILM COATED ORAL at 21:44

## 2018-06-01 RX ADMIN — SODIUM CHLORIDE 750 MILLILITER(S): 9 INJECTION INTRAMUSCULAR; INTRAVENOUS; SUBCUTANEOUS at 12:24

## 2018-06-01 RX ADMIN — MEMANTINE HYDROCHLORIDE 10 MILLIGRAM(S): 10 TABLET ORAL at 21:44

## 2018-06-01 NOTE — ED PROVIDER NOTE - OBJECTIVE STATEMENT
85 year old female with pmhx of Aortic dissection, DVT on eliquis, HTN, CAD, and CKD, presents with right sided CP since this morning. Pain radiating down arm. No SOB, cough, fever, chills, calf pain or swelling. no abd pain

## 2018-06-01 NOTE — CONSULT NOTE ADULT - ASSESSMENT
84 yo female with significant PMHx for DVTs (on Eliquis), type B aortic dissection diagnosed 2/2017, as well as ascending aortic aneurysm 4.4 cm (unchanged from 2012), HTN, CAD, CKD GFR 32 (which prevented the pt to have a follow up CTA in past "unless necessary") who presented today with c/o right sided chest pain and tingling of the right arm/hand which started 6 AM today. On presentation to ED /78, pt received Lisinopril dose with SBP improving to 118. Cardiology evaluated and wants to treat pt conservatively. Upon evaluation, pt still c/o chest pain and right arm tingling.    - Please, obtain CTA chest for further evaluation    discussed with Dr. Blanc 86 yo female with significant PMHx for DVTs (on Eliquis), type B aortic dissection diagnosed 2/2017, as well as ascending aortic aneurysm 4.4 cm (unchanged from 2012), HTN, CAD, CKD GFR 32 (which prevented the pt to have a follow up CTA in past "unless necessary") who presented today with c/o right sided chest pain and tingling of the right arm/hand which started 6 AM today. On presentation to ED /78, pt received Lisinopril dose with SBP improving to 118. Cardiology evaluated and wants to treat pt conservatively. Upon evaluation, pt still c/o chest pain and right arm tingling.    - Please, obtain CTA chest for further evaluation    discussed with Dr. Blanc    14:35   CTA reviewed with Dr. Blanc    < from: CT Angio Chest Dissection Protocol (06.01.18 @ 13:42) >  Ascending thoracic aortic dissection and aneurysm with intramural   hematoma, progressed significantly worsened from the prior study of   February 2017.    < end of copied text >    Spoke with ED ANDREA Anne who updated me that CTS evaluation requested and pt is upgraded to critical care area in ED

## 2018-06-01 NOTE — ED PROVIDER NOTE - PHYSICAL EXAMINATION
CONST: Well appearing in NAD  EYES: PERRL, EOMI, Sclera and conjunctiva clear.   ENT: No nasal discharge. TM's clear B/L without drainage. Oropharynx normal appearing, no erythema or exudates. Uvula midline.  NECK: Non-tender, no meningeal signs  CARD: Normal S1 S2; Normal rate and rhythm  RESP: Equal BS B/L, No wheezes, rhonchi or rales. No distress  GI: Soft, non-tender, non-distended.  MS: Normal ROM in all extremities. No midline spinal tenderness. no calf tenderness or swelling, pulses 2 +   SKIN: Warm, dry, no acute rashes. Good turgor  NEURO: A&Ox3, No focal deficits. Strength 5/5 with no sensory deficits. Steady gait

## 2018-06-01 NOTE — H&P ADULT - NSHPPHYSICALEXAM_GEN_ALL_CORE
Height (cm): 154.94 (06-01-18 @ 09:11)  Weight (kg): 70 (06-01-18 @ 09:11)  BMI (kg/m2): 29.2 (06-01-18 @ 09:11)  BSA (m2): 1.69 (06-01-18 @ 09:11)  T(F): 97.5 (06-01-18 @ 08:20), Max: 97.5 (06-01-18 @ 08:20)  HR: 76 (06-01-18 @ 08:20) (76 - 76)  BP: 170/87 (06-01-18 @ 08:20) (170/87 - 170/87)  RR: 18 (06-01-18 @ 08:20) (18 - 18)  SpO2: 97% (06-01-18 @ 08:20) (97% - 97%)
Height (cm): 154.94 (06-01-18 @ 09:11)  Weight (kg): 70 (06-01-18 @ 14:54)  BMI (kg/m2): 29.2 (06-01-18 @ 14:54)  BSA (m2): 1.69 (06-01-18 @ 14:54)  T(F): 97.8 (06-01-18 @ 14:54), Max: 97.8 (06-01-18 @ 14:54)  HR: 72 (06-01-18 @ 15:14) (66 - 76)  BP: 116/70 (06-01-18 @ 15:14) (105/54 - 170/87)  RR: 18 (06-01-18 @ 15:14) (18 - 18)  SpO2: 98% (06-01-18 @ 15:14) (97% - 99%)    Physical Exam:  General: Not in distress.   HEENT: Moist mucus membranes. PERRLA.  Cardio: Regular rate and rhythm, S1, S2, no murmur, rub, or gallop.  Pulm: Clear to auscultation bilaterally. No wheezing, rales, or rhonchi.  Abdomen: Soft, non-tender, non-distended. Normoactive bowel sounds.  Extremities: No cyanosis or edema bilaterally. No calf tenderness to palpation.  Neuro: A&O x3. No focal deficits. CN II - XII grossly intact.

## 2018-06-01 NOTE — H&P ADULT - NSHPLABSRESULTS_GEN_ALL_CORE
CBC Full  -  ( 01 Jun 2018 09:12 )  WBC Count : 9.35 K/uL  Hemoglobin : 11.3 g/dL  Hematocrit : 35.8 %  Platelet Count - Automated : 259 K/uL  Mean Cell Volume : 89.7 fL  Mean Cell Hemoglobin : 28.3 pg  Mean Cell Hemoglobin Concentration : 31.6 g/dL  Auto Neutrophil % : 81.4 %  Auto Lymphocyte % : 9.2 %  Auto Monocyte % : 7.7 %  Auto Eosinophil % : 1.1 %  Auto Basophil % : 0.2 %    BMP: 06-01-18 @ 09:12  139 | 100 | 36   -----------------< 110  5.2  | 20 | 1.2  eGFR(AA): 48, eGFR (non-AA): 41  Ca 9.0, Mg 1.9, P --    LFTs: 06-01-18 @ 09:12  TP  6.6  | 4.0 Alb   ---------------  TB  0.3  | --  DB   ---------------  ALT 11  | 21  AST            ^          78  ALK    PT/INR/PTT: 06-01-18 @ 09:12  15.50 | 27.5        ^      1.42    Cardiac Enzymes: 06-01-18 @ 09:12  Trop T: <0.01  Trop I:   CKMB:    CK: 41    Serum Pro-Brain Natriuretic Peptide (06.01.18 @ 09:12)    Serum Pro-Brain Natriuretic Peptide: 616 pg/mL    CXR: No acute cardiopulmonary disease    CT Angio Chest Dissection Protocol (06.01.18 @ 13:42):  Ascending thoracic aortic dissection and aneurysm with intramural hematoma, progressed significantly worsened from the prior study of February 2017.    12 Lead ECG (06.01.18 @ 09:43):  Sinus rhythm with 1st degree A-V block with Premature atrial complexes with aberrant conduction CBC Full  -  ( 01 Jun 2018 09:12 )  WBC Count : 9.35 K/uL  Hemoglobin : 11.3 g/dL  Hematocrit : 35.8 %  Platelet Count - Automated : 259 K/uL  Mean Cell Volume : 89.7 fL  Mean Cell Hemoglobin : 28.3 pg  Mean Cell Hemoglobin Concentration : 31.6 g/dL  Auto Neutrophil % : 81.4 %  Auto Lymphocyte % : 9.2 %  Auto Monocyte % : 7.7 %  Auto Eosinophil % : 1.1 %  Auto Basophil % : 0.2 %    BMP: 06-01-18 @ 09:12  139 | 100 | 36   -----------------< 110  5.2  | 20 | 1.2  eGFR(AA): 48, eGFR (non-AA): 41  Ca 9.0, Mg 1.9, P --    LFTs: 06-01-18 @ 09:12  TP  6.6  | 4.0 Alb   ---------------  TB  0.3  | --  DB   ---------------  ALT 11  | 21  AST            ^          78  ALK    PT/INR/PTT: 06-01-18 @ 09:12  15.50 | 27.5        ^      1.42    Cardiac Enzymes: 06-01-18 @ 09:12  Trop T: <0.01  Trop I:   CKMB:    CK: 41    Serum Pro-Brain Natriuretic Peptide (06.01.18 @ 09:12)    Serum Pro-Brain Natriuretic Peptide: 616 pg/mL    CXR: No acute cardiopulmonary disease    < from: CT Angio Chest Dissection Protocol (06.01.18 @ 13:42) >    INTERPRETATION:  Clinical History / Reason for exam: Dissection.    CT angiogram was performed from the apices down to the aortic bifurcation   before and after the administration of intravenous contrast. 100 cc of   low osmolar nonionic contrast was utilized. Coronal and sagittal rendered   images were obtained. Comparison is made with a study dated 3/4/2017.    Noncontrast studies demonstrates presence of intramuralhematoma within   the ascending aorta as well as the proximal descending aorta.   Postcontrast reveals expansion of the previously seen descending aortic   dissection to this time to an ascending aortic dissection and hematoma,   therefore type a. The size of the ascending aorta including the   dissection and hematoma measures 5.4 cm x 5.2 cm. This hematoma extends   down to the aortic root near the sinus of Valsalva. There is   atherosclerosis of the coronary vasculature. The descending thoracic  aorta itself reveals atherosclerotic change without extension of the   dissection flap past the proximal descending aorta. At the diaphragmatic   hiatus, the aorta measures 2.5 cm. There is atherosclerotic change seen.   There is atherosclerotic change of the branching vasculature.     The heart size itself is normal. There is no pericardial effusion.    The tracheobronchial tree is patent. There is no change in the appearance   of the thyroid gland.    Fibrotic change at the lung bases is seen.    Evaluation of the upper abdomen reveals unchanged appearance of the   kidneys, liver, spleen, and gallbladder. The pancreas is within normal   limits.    There are degenerative changes of the spine.    Impression:    Ascending thoracic aortic dissection and aneurysm with intramural   hematoma, progressed significantly worsened from the prior study of   February 2017.    < end of copied text >        12 Lead ECG (06.01.18 @ 09:43):  Sinus rhythm with 1st degree A-V block with Premature atrial complexes with aberrant conduction

## 2018-06-01 NOTE — H&P ADULT - ATTENDING COMMENTS
86 y/o F with PMH of aortic aneurysm (diagnosed February 2017), DVT (diagnosed March, 2017) on eliquis, HLD, HTN, and dementia presented for acute onset of right chest pain, jaw pain, and R. arm paresthesias found to have worsening of aortic dissection now reaching the ascending aorta therefore type A.      1.  Ascending aortic dissection    type A    CT surgery eval     A-line placed     On labetalol for a target SBP of 110-130 and HR of around 60 as per Dr. Bradley's recommendations      Check 2d echo, trend cardiac enzymes, will increase lipitor to 80mg qhs.      2. DVT      Hold Eliquis in view of active dissection.     3 HTN      Hold Lisinopril now on labetalol     4.  Dementia      On Namenda.      5. HLD      On lipitor

## 2018-06-01 NOTE — ED PROCEDURE NOTE - CPROC ED ARTER LINE DETAIL1
Using sterile technique, the correct location was identified, and a needle was inserted into the artery (specify in FT).
Positive blood return was obtained via the catheter./Hemostasis was achieved with direct pressure, and a dry sterile dressing applied./Using sterile technique, the correct location was identified, and a needle was inserted into the artery (specify in FT)./Connected to a pressurized flush line./Line was sutured in place.

## 2018-06-01 NOTE — ED PROVIDER NOTE - PROGRESS NOTE DETAILS
pt now reports right sided jaw pain.  repeat ekg ordered pt no distress, normal smile, no signs of cva repeat unchanged repeat ekg unchanged pt with persistent pain, cr 1.2, pt has received iv hydration. shared decision making with family. will pursue CT angio to evaluate for aortic dissection. pt is on eliquis, unlikely PE, no tachypnea, tachycardia. vascular consulted for inpatient consult after pt was admitted pt was seen by vascular. family changed mind and willing to allow CT dissection to be done. CT study positive. pt in no distress, vascular made aware. inpatient team MAR notified. CT surgery pa notified ct surgery pa has seen pt. [facility] pt signed out to Dr. Domínguez.  medicine team aware and pt is no longer admitted. ed team will resume care. coordinatge care with vascular and ct surgery CT surgery and patient agree to medical management, requesting A line, and labetolol drip. cardiac fellow at bedside, requesting cardiac echo.. pt approved for CCU, cancelled medicine admission, and endorsed to ICU resident.

## 2018-06-01 NOTE — CONSULT NOTE ADULT - ASSESSMENT
CTS ATTENDING    PATIENT EXAMINED IN ED  FAMILY WELL AWARE OF THE PATIENT'S DIAGNOSIS OF ACUTE TYPE A AORTIC DISSECTION  PATIENT HAS LIMITED FUNCTIONAL CAPACITY AND IS OF ADVANCED AGE AND COMORBIDITY PROFILE  SHE APPEARS WEAK, FRAIL AND MALNOURISHED, AND UNLIKELY TO RECOVER FROM A MAJOR OPERATION  I HAD A LONG DISCUSSION WITH THE DAUGHTERS AND   THE FAMILY WAS NOT IN FAVOR OF UNDERTAKING SURGICAL REPAIR AND I DID NOT ADVISE SURGERY  I RECOMMENDED PALLIATIVE CARE CONSULTATION AND SERIOUS CONSIDERATION TO HOSPICE, DNR-DNI STATUS AND COMFORT MEASURES  THE HIGH LIKELIHOOD OF MORTALITY WITHIN HOURS TO DAYS WAS ALSO GIVEN TO THE PATIENT, AS WELL AS THE ALTERNATIVE OF PROCEEDING WITH SURGICAL REPAIR, WHICH WOULD INCUR SIGNIFICANT RISK OF DEATH, RENAL FAILURE, DIALYSIS, RESPIRATORY FAILURE AND STROKE, WITH ALMOST NO PROBABILITY FOR RETURNING TO HER PRESENT LEVEL OF AWARENESS ANDFUNCTION.    THE FAMILY WAS IN THE PROCESS OF COLLECTIVELY MAKING A DECISION REGARDING FURTHER CARE.  NO SURGERY WAS RECOMMENDED.

## 2018-06-01 NOTE — H&P ADULT - PROBLEM SELECTOR PLAN 1
Acute thoracic aortic dissection worsened from type B now reached the ascending aorta therefore type A  Patient's family now acquiring different opinions from CT surgeons regarding appropriate management. A-line placed, On labetalol for a target SBP of 110-130 and HR of around 60 as per Dr. Bradley's recommendations.  Check 2d echo, trend cardiac enzymes. Acute thoracic aortic dissection worsened from type B now reached the ascending aorta therefore type A  Patient's family now acquiring different opinions from CT surgeons regarding appropriate management. A-line placed, On labetalol for a target SBP of 110-130 and HR of around 60 as per Dr. Bradley's recommendations.  Check 2d echo, trend cardiac enzymes, will increase lipitor to 80mg qhs

## 2018-06-01 NOTE — H&P ADULT - HISTORY OF PRESENT ILLNESS
84 y/o F with PMH of ... , DVT on eliquis, HTN, presented for acute onset of right chest pain, jaw pain, and R. arm paresthesias. She states she woke up around 6am the day of presentation with the pain. It feels like a tightness in her chest and a numbness/tingling sensation traveling from her right shoulder down the entire arm to her fingers. Symptoms persisted since onset and are still present at the time of admission. Vascular surgery was consulted in the ED, who recommended a CTA to look for aortic dissection. The test came back positive for ascending thoracic aortic dissection and aneurysm with intramural hematoma significantly worsened from the prior study of February 2017.  The patient refused surgical intervention, so she was admitted to the medicine service for medical management. 86 y/o F with PMH of aortic aneurysm, type b dissection (diagnosed February 2017), DVT (diagnosed March, 2017) on eliquis, and HTN presented for acute onset of right chest pain, jaw pain, and R. arm paresthesias. She states she woke up around 6am the day of presentation with the pain. It feels like a tightness in her chest and a numbness/tingling sensation traveling from her right shoulder down the entire arm to her fingers. Symptoms persisted since onset and are still present at the time of admission. Vascular surgery was consulted in the ED, who recommended a CTA to look for aortic dissection. The test came back positive for ascending thoracic aortic dissection and aneurysm with intramural hematoma significantly worsened from the prior study of February 2017.  The patient refused surgical intervention, so she was admitted to the medicine service for medical management. 86 y/o F with PMH of aortic aneurysm (diagnosed February 2017), DVT (diagnosed March, 2017) on eliquis, HTN, HLD, and dementia presented for acute onset of right chest pain, jaw pain, and R. arm paresthesias. She states she woke up around 6am the day of presentation with the pain. It feels like a tightness in her chest and a numbness/tingling sensation traveling from her right shoulder down the entire arm to her fingers. Symptoms persisted since onset and are still present at the time of admission. Vascular surgery was consulted in the ED, who recommended a CTA to look for aortic dissection. The test came back positive for ascending thoracic aortic dissection and aneurysm with intramural hematoma significantly worsened from the prior study of February 2017.  The patient refused surgical intervention, so she was admitted to the medicine service for medical management. 86 y/o F with PMH of aortic aneurysm (diagnosed February 2017), DVT (diagnosed March, 2017) on eliquis, HTN, HLD, and dementia presented for acute onset of right chest pain, jaw pain, and R. arm paresthesias. She states she woke up around 6am the day of presentation with the pain. It feels like a tightness in her chest and a numbness/tingling sensation traveling from her right shoulder down the entire arm to her fingers. Symptoms persisted since onset and are still present at the time of admission. Vascular surgery was consulted in the ED, who recommended a CTA to look for aortic dissection. The test came back positive for ascending thoracic aortic dissection and aneurysm with intramural hematoma significantly worsened from the prior study of February 2017.  As per ED, discussion between CT surgery team and family ended in opting out of a surgical intervention so she was admitted to the Coronary Care Unit for medical management.

## 2018-06-01 NOTE — H&P ADULT - PMH
Dissecting aneurysm of anterior cerebral artery    History of deep vein thrombosis    Hypertension, unspecified type Dementia    Dyslipidemia    History of deep vein thrombosis    Hypertension, unspecified type    Thoracic aortic dissection

## 2018-06-01 NOTE — H&P ADULT - NSHPLABSRESULTS_GEN_ALL_CORE
CBC Full  -  ( 01 Jun 2018 09:12 )  WBC Count : 9.35 K/uL  Hemoglobin : 11.3 g/dL  Hematocrit : 35.8 %  Platelet Count - Automated : 259 K/uL  Mean Cell Volume : 89.7 fL  Mean Cell Hemoglobin : 28.3 pg  Mean Cell Hemoglobin Concentration : 31.6 g/dL  Auto Neutrophil % : 81.4 %  Auto Lymphocyte % : 9.2 %  Auto Monocyte % : 7.7 %  Auto Eosinophil % : 1.1 %  Auto Basophil % : 0.2 %    BMP: 06-01-18 @ 09:12  139 | 100 | 36   -----------------< 110  5.2  | 20 | 1.2  eGFR(AA): 48, eGFR (non-AA): 41  Ca 9.0, Mg 1.9, P --    LFTs: 06-01-18 @ 09:12  TP  6.6  | 4.0 Alb   ---------------  TB  0.3  | --  DB   ---------------  ALT 11  | 21  AST            ^          78  ALK    PT/INR/PTT: 06-01-18 @ 09:12  15.50 | 27.5        ^      1.42    Cardiac Enzymes: 06-01-18 @ 09:12  Trop T: <0.01  Trop I:   CKMB:    CK: 41    Serum Pro-Brain Natriuretic Peptide (06.01.18 @ 09:12)    Serum Pro-Brain Natriuretic Peptide: 616 pg/mL    CXR:

## 2018-06-01 NOTE — ED ADULT NURSE NOTE - OBJECTIVE STATEMENT
pt with midsternal chest pain starting this morning upon waking up. pt with sob at times. pt with hx of dissection.

## 2018-06-01 NOTE — H&P ADULT - ASSESSMENT
84 y/o F with PMH of aortic aneurysm (diagnosed February 2017), DVT (diagnosed March, 2017) on eliquis, HLD, HTN, and dementia presented for acute onset of right chest pain, jaw pain, and R. arm paresthesias. Found to have acute aortic dissection.    1.) Acute thoracic aortic dissection    HTN / HLD    History of DVT    - Hold eliquis    Dementia: 86 y/o F with PMH of aortic aneurysm (diagnosed February 2017), DVT (diagnosed March, 2017) on eliquis, HLD, HTN, and dementia presented for acute onset of right chest pain, jaw pain, and R. arm paresthesias found to have worsening of aortic dissection now reaching the ascending aorta therefore type A.

## 2018-06-01 NOTE — H&P ADULT - NSHPSOCIALHISTORY_GEN_ALL_CORE
Social History:    Marital Status:  ( x )    (   ) Single    (   )    (  )     Substance Use (street drugs): ( x ) never used  (  ) other:  Tobacco Usage:  ( x ) never smoked   (   ) former smoker   (   ) current smoker  (     ) pack year  (        ) last cigarette date  Alcohol Usage: Denies

## 2018-06-01 NOTE — ED PROVIDER NOTE - MEDICAL DECISION MAKING DETAILS
extensive d/w family and Dr. Delgado - they do not want sx at this time, will dw palliative care and stay admitted for further evaluation.

## 2018-06-01 NOTE — H&P ADULT - PSH
No significant past surgical history H/O partial thyroidectomy    H/O rotator cuff tear    Status post hip surgery

## 2018-06-01 NOTE — H&P ADULT - NSHPOUTPATIENTPROVIDERS_GEN_ALL_CORE
PCP: Dr. Corcoran  Cardiology: Dr. Giron  Hematology: Dr. Daly ([place])  Nephrology: Dr. Kleiner

## 2018-06-01 NOTE — ED PROVIDER NOTE - ATTENDING CONTRIBUTION TO CARE
85 yr old f hx of aortic dissection, dvt on eliquis, ckd, here for eval of right sided chest pain. pt reports feeling tingling in right chest. pain not radiating, not tearing, stabbibg, associated with diaphoresis, n/v/arm pain. pt on exam no distress, s1s2 ctab soft nt/nd, 2+ radial pulse bl, no calf swelling. impression, atypical chest pain, with reported hx of contrast induced nephropathy and low suspicion for aortic pathology decision to wait for cr before ct. cr returned and borderline gfr, shared decision making done and plan for ct angio. afterwards pt was seen by cardio in ed, after discussion with family preferred MRI even though would not be obtained for possibly atleast one day, bp control, admit to tele. vascular was consulted and saw pt at time of admission.

## 2018-06-01 NOTE — ED PROVIDER NOTE - NS ED ROS FT
Review of Systems:  	•	CONSTITUTIONAL - no fever, no diaphoresis, no chills  	Skin: no skin rash   	•	EYES - no eye pain, no blurry vision  	•	ENT - no change in hearing, no sore throat, no ear pain or tinnitus  	•	RESPIRATORY - no shortness of breath, no cough  	•	CARDIAC - + CP  	•	GI - no abd pain, no nausea, no vomiting, no diarrhea, no constipation  	•	GENITO-URINARY - no discharge, no dysuria; no hematuria, no increased urinary frequency  	•	MUSCULOSKELETAL - no joint paint, no swelling, no redness  	•	NEUROLOGIC - no weakness, no headache, no paresthesias, no LOC  	•

## 2018-06-01 NOTE — ED PROVIDER NOTE - CRITICAL CARE PROVIDED
direct patient care (not related to procedure)/interpretation of diagnostic studies/documentation/conducted a detailed discussion of DNR status/consultation with other physicians/additional history taking

## 2018-06-02 LAB
ANION GAP SERPL CALC-SCNC: 17 MMOL/L — HIGH (ref 7–14)
BASOPHILS # BLD AUTO: 0.01 K/UL — SIGNIFICANT CHANGE UP (ref 0–0.2)
BASOPHILS NFR BLD AUTO: 0.1 % — SIGNIFICANT CHANGE UP (ref 0–1)
BUN SERPL-MCNC: 30 MG/DL — HIGH (ref 10–20)
CALCIUM SERPL-MCNC: 8.3 MG/DL — LOW (ref 8.5–10.1)
CHLORIDE SERPL-SCNC: 106 MMOL/L — SIGNIFICANT CHANGE UP (ref 98–110)
CHOLEST SERPL-MCNC: 120 MG/DL — SIGNIFICANT CHANGE UP (ref 100–200)
CK MB CFR SERPL CALC: 3.7 NG/ML — SIGNIFICANT CHANGE UP (ref 0.6–6.3)
CK SERPL-CCNC: 41 U/L — SIGNIFICANT CHANGE UP (ref 0–225)
CO2 SERPL-SCNC: 18 MMOL/L — SIGNIFICANT CHANGE UP (ref 17–32)
CREAT SERPL-MCNC: 1.2 MG/DL — SIGNIFICANT CHANGE UP (ref 0.7–1.5)
EOSINOPHIL # BLD AUTO: 0 K/UL — SIGNIFICANT CHANGE UP (ref 0–0.7)
EOSINOPHIL NFR BLD AUTO: 0 % — SIGNIFICANT CHANGE UP (ref 0–8)
GLUCOSE SERPL-MCNC: 126 MG/DL — HIGH (ref 70–99)
HCT VFR BLD CALC: 28 % — LOW (ref 37–47)
HDLC SERPL-MCNC: 40 MG/DL — SIGNIFICANT CHANGE UP (ref 40–125)
HGB BLD-MCNC: 8.9 G/DL — LOW (ref 12–16)
IMM GRANULOCYTES NFR BLD AUTO: 0.5 % — HIGH (ref 0.1–0.3)
LIPID PNL WITH DIRECT LDL SERPL: 60 MG/DL — SIGNIFICANT CHANGE UP (ref 4–129)
LYMPHOCYTES # BLD AUTO: 0.69 K/UL — LOW (ref 1.2–3.4)
LYMPHOCYTES # BLD AUTO: 6.9 % — LOW (ref 20.5–51.1)
MAGNESIUM SERPL-MCNC: 1.7 MG/DL — LOW (ref 1.8–2.4)
MCHC RBC-ENTMCNC: 28.6 PG — SIGNIFICANT CHANGE UP (ref 27–31)
MCHC RBC-ENTMCNC: 31.8 G/DL — LOW (ref 32–37)
MCV RBC AUTO: 90 FL — SIGNIFICANT CHANGE UP (ref 81–99)
MONOCYTES # BLD AUTO: 1.25 K/UL — HIGH (ref 0.1–0.6)
MONOCYTES NFR BLD AUTO: 12.4 % — HIGH (ref 1.7–9.3)
NEUTROPHILS # BLD AUTO: 8.07 K/UL — HIGH (ref 1.4–6.5)
NEUTROPHILS NFR BLD AUTO: 80.1 % — HIGH (ref 42.2–75.2)
PLATELET # BLD AUTO: 205 K/UL — SIGNIFICANT CHANGE UP (ref 130–400)
POTASSIUM SERPL-MCNC: 4.3 MMOL/L — SIGNIFICANT CHANGE UP (ref 3.5–5)
POTASSIUM SERPL-SCNC: 4.3 MMOL/L — SIGNIFICANT CHANGE UP (ref 3.5–5)
RBC # BLD: 3.11 M/UL — LOW (ref 4.2–5.4)
RBC # FLD: 13.3 % — SIGNIFICANT CHANGE UP (ref 11.5–14.5)
SODIUM SERPL-SCNC: 141 MMOL/L — SIGNIFICANT CHANGE UP (ref 135–146)
TOTAL CHOLESTEROL/HDL RATIO MEASUREMENT: 3 RATIO — LOW (ref 4–5.5)
TRIGL SERPL-MCNC: 91 MG/DL — SIGNIFICANT CHANGE UP (ref 10–149)
TROPONIN T SERPL-MCNC: 0.17 NG/ML — CRITICAL HIGH
WBC # BLD: 10.07 K/UL — SIGNIFICANT CHANGE UP (ref 4.8–10.8)
WBC # FLD AUTO: 10.07 K/UL — SIGNIFICANT CHANGE UP (ref 4.8–10.8)

## 2018-06-02 PROCEDURE — 99222 1ST HOSP IP/OBS MODERATE 55: CPT

## 2018-06-02 RX ORDER — LABETALOL HCL 100 MG
2 TABLET ORAL
Qty: 1000 | Refills: 0 | Status: DISCONTINUED | OUTPATIENT
Start: 2018-06-02 | End: 2018-06-02

## 2018-06-02 RX ORDER — LABETALOL HCL 100 MG
100 TABLET ORAL ONCE
Qty: 0 | Refills: 0 | Status: COMPLETED | OUTPATIENT
Start: 2018-06-02 | End: 2018-06-02

## 2018-06-02 RX ORDER — ACETAMINOPHEN 500 MG
650 TABLET ORAL EVERY 6 HOURS
Qty: 0 | Refills: 0 | Status: DISCONTINUED | OUTPATIENT
Start: 2018-06-02 | End: 2018-06-02

## 2018-06-02 RX ORDER — LABETALOL HCL 100 MG
0.33 TABLET ORAL
Qty: 400 | Refills: 0 | Status: DISCONTINUED | OUTPATIENT
Start: 2018-06-02 | End: 2018-06-02

## 2018-06-02 RX ORDER — LABETALOL HCL 100 MG
100 TABLET ORAL THREE TIMES A DAY
Qty: 0 | Refills: 0 | Status: DISCONTINUED | OUTPATIENT
Start: 2018-06-02 | End: 2018-06-04

## 2018-06-02 RX ORDER — MAGNESIUM OXIDE 400 MG ORAL TABLET 241.3 MG
400 TABLET ORAL ONCE
Qty: 0 | Refills: 0 | Status: COMPLETED | OUTPATIENT
Start: 2018-06-02 | End: 2018-06-02

## 2018-06-02 RX ORDER — ACETAMINOPHEN 500 MG
650 TABLET ORAL EVERY 6 HOURS
Qty: 0 | Refills: 0 | Status: DISCONTINUED | OUTPATIENT
Start: 2018-06-02 | End: 2018-06-07

## 2018-06-02 RX ORDER — LABETALOL HCL 100 MG
0.5 TABLET ORAL
Qty: 1000 | Refills: 0 | Status: DISCONTINUED | OUTPATIENT
Start: 2018-06-02 | End: 2018-06-03

## 2018-06-02 RX ADMIN — Medication 650 MILLIGRAM(S): at 11:47

## 2018-06-02 RX ADMIN — Medication 5 MG/MIN: at 10:34

## 2018-06-02 RX ADMIN — Medication 650 MILLIGRAM(S): at 10:47

## 2018-06-02 RX ADMIN — Medication 1 MILLIGRAM(S): at 12:45

## 2018-06-02 RX ADMIN — ATORVASTATIN CALCIUM 80 MILLIGRAM(S): 80 TABLET, FILM COATED ORAL at 21:00

## 2018-06-02 RX ADMIN — MEMANTINE HYDROCHLORIDE 5 MILLIGRAM(S): 10 TABLET ORAL at 13:06

## 2018-06-02 RX ADMIN — MEMANTINE HYDROCHLORIDE 10 MILLIGRAM(S): 10 TABLET ORAL at 21:01

## 2018-06-02 RX ADMIN — LAMOTRIGINE 100 MILLIGRAM(S): 25 TABLET, ORALLY DISINTEGRATING ORAL at 12:45

## 2018-06-02 RX ADMIN — Medication 100 MILLIGRAM(S): at 09:57

## 2018-06-02 RX ADMIN — MAGNESIUM OXIDE 400 MG ORAL TABLET 400 MILLIGRAM(S): 241.3 TABLET ORAL at 11:21

## 2018-06-02 RX ADMIN — Medication 100 MILLIGRAM(S): at 21:00

## 2018-06-02 NOTE — PROGRESS NOTE ADULT - SUBJECTIVE AND OBJECTIVE BOX
TIMUR SCAR  85y  Female    Patient is a 85y old  Female who presents with a chief complaint of R. Chest pain, jaw pain, and R. arm paresthesias (01 Jun 2018 15:15)      INTERVAL HPI/OVERNIGHT EVENTS: None    T(C): 36.3 (06-02-18 @ 00:00), Max: 36.6 (06-01-18 @ 14:54)  HR: 50 (06-02-18 @ 03:00) (50 - 76)  BP: 154/65 (06-01-18 @ 19:45) (105/54 - 170/87)  RR: 34 (06-02-18 @ 03:00) (18 - 34)  SpO2: 98% (06-02-18 @ 03:00) (93% - 99%)  Wt(kg): --Vital Signs Last 24 Hrs  T(C): 36.3 (02 Jun 2018 00:00), Max: 36.6 (01 Jun 2018 14:54)  T(F): 97.4 (02 Jun 2018 00:00), Max: 97.8 (01 Jun 2018 14:54)  HR: 50 (02 Jun 2018 03:00) (50 - 76)  BP: 154/65 (01 Jun 2018 19:45) (105/54 - 170/87)  BP(mean): 95 (01 Jun 2018 19:45) (95 - 95)  RR: 34 (02 Jun 2018 03:00) (18 - 34)  SpO2: 98% (02 Jun 2018 03:00) (93% - 99%)    PHYSICAL EXAM:  GENERAL: NAD, well-groomed, well-developed  HEAD:  Atraumatic, Normocephalic  NECK: Supple, No JVD, Normal thyroid  NERVOUS SYSTEM:  Alert & Oriented X3, Good concentration; Motor Strength 5/5 B/L upper and lower extremities; DTRs 2+ intact and symmetric  CHEST/LUNG: Clear to percussion bilaterally; No rales, rhonchi, wheezing, or rubs  HEART: Regular rate and rhythm; No murmurs, rubs, or gallops  ABDOMEN: Soft, Nontender, Nondistended; Bowel sounds present  EXTREMITIES:  2+ Peripheral Pulses, No clubbing, cyanosis, or edema    Consultant(s) Notes Reviewed:  [x ] YES  [ ] NO    Discussed with Consultants/Other Providers/Residents [ x] YES     LABS                         11.3   9.35  )-----------( 259      ( 01 Jun 2018 09:12 )             35.8     06-01    139  |  100  |  36<H>  ----------------------------<  110<H>  5.2<H>   |  20  |  1.2    Ca    9.0      01 Jun 2018 09:12  Mg     1.9     06-01    TPro  6.6  /  Alb  4.0  /  TBili  0.3  /  DBili  x   /  AST  21  /  ALT  11  /  AlkPhos  78  06-01    PT/INR - ( 01 Jun 2018 09:12 )   PT: 15.50 sec;   INR: 1.42 ratio         PTT - ( 01 Jun 2018 09:12 )  PTT:27.5 sec      LIVER FUNCTIONS - ( 01 Jun 2018 09:12 )  Alb: 4.0 g/dL / Pro: 6.6 g/dL / ALK PHOS: 78 U/L / ALT: 11 U/L / AST: 21 U/L / GGT: x           CAPILLARY BLOOD GLUCOSE            RADIOLOGY & ADDITIONAL TESTS:    Imaging Personally Reviewed:  [x ] YES  [ ] NO    MEDICATIONS  (STANDING):  atorvastatin 80 milliGRAM(s) Oral at bedtime  folic acid 1 milliGRAM(s) Oral daily  furosemide    Tablet 20 milliGRAM(s) Oral <User Schedule>  labetalol Infusion 3 mG/Min (45 mL/Hr) IV Continuous <Continuous>  lamoTRIgine 100 milliGRAM(s) Oral daily  memantine 5 milliGRAM(s) Oral daily  memantine 10 milliGRAM(s) Oral at bedtime    MEDICATIONS  (PRN):      HEALTH ISSUES - PROBLEM Dx:  Dyslipidemia: Dyslipidemia  Dementia: Dementia  Hypertension, unspecified type: Hypertension, unspecified type  History of deep vein thrombosis: History of deep vein thrombosis  Ascending aortic dissection: Ascending aortic dissection

## 2018-06-02 NOTE — PROGRESS NOTE ADULT - SUBJECTIVE AND OBJECTIVE BOX
SUBJECTIVE:    Patient is a 85y old Female who presents with a chief complaint of R. Chest pain, jaw pain, and R. arm paresthesias (01 Jun 2018 15:15)    Currently admitted to medicine with the primary diagnosis of Chest pain     Today is hospital day 1d. This morning she is resting comfortably in bed and reports no new issues or overnight events.     PAST MEDICAL & SURGICAL HISTORY  Dyslipidemia  Dementia  Thoracic aortic dissection  History of deep vein thrombosis  Hypertension, unspecified type  Status post hip surgery  H/O rotator cuff tear  H/O partial thyroidectomy    SOCIAL HISTORY:  Negative for smoking/alcohol/drug use.     ALLERGIES:  NSAIDs (Hives; Urticaria)    MEDICATIONS:  STANDING MEDICATIONS  atorvastatin 80 milliGRAM(s) Oral at bedtime  folic acid 1 milliGRAM(s) Oral daily  furosemide    Tablet 20 milliGRAM(s) Oral <User Schedule>  labetalol Infusion 3 mG/Min IV Continuous <Continuous>  lamoTRIgine 100 milliGRAM(s) Oral daily  memantine 5 milliGRAM(s) Oral daily  memantine 10 milliGRAM(s) Oral at bedtime    PRN MEDICATIONS    VITALS:   T(F): 97.4  HR: 50  BP: 154/65  RR: 34  SpO2: 98%    LABS:                        11.3   9.35  )-----------( 259      ( 01 Jun 2018 09:12 )             35.8     06-01    139  |  100  |  36<H>  ----------------------------<  110<H>  5.2<H>   |  20  |  1.2    Ca    9.0      01 Jun 2018 09:12  Mg     1.9     06-01    TPro  6.6  /  Alb  4.0  /  TBili  0.3  /  DBili  x   /  AST  21  /  ALT  11  /  AlkPhos  78  06-01    PT/INR - ( 01 Jun 2018 09:12 )   PT: 15.50 sec;   INR: 1.42 ratio         PTT - ( 01 Jun 2018 09:12 )  PTT:27.5 sec      Creatine Kinase, Serum: 36 U/L (06-01-18 @ 21:07)  Troponin T, Serum: 0.12 ng/mL <HH> (06-01-18 @ 21:07)  Troponin T, Serum: <0.01 ng/mL (06-01-18 @ 09:12)  Creatine Kinase, Serum: 41 U/L (06-01-18 @ 09:12)      CARDIAC MARKERS ( 01 Jun 2018 21:07 )  x     / 0.12 ng/mL / 36 U/L / x     / 4.2 ng/mL  CARDIAC MARKERS ( 01 Jun 2018 09:12 )  x     / <0.01 ng/mL / 41 U/L / x     / x          RADIOLOGY:  CT Angio Chest Dissection Protocol (06.01.18)  Ascending thoracic aortic dissection and aneurysm with intramural hematoma, progressed significantly worsened from the prior study of February 2017.    Xray Chest 1 View AP/PA (06.01.18)  Cardiac/mediastinum/hilum: Mild cardiomegaly. Dilated and tortuous aortic arch/descending thoracic aorta with atherosclerotic changes, which is stable, allowing the difference in technique.  Lung parenchyma/Pleura: There is no evidence of consolidation, effusion or pneumothorax.  Skeleton/soft tissues: Unchanged      PHYSICAL EXAM:  GEN: No acute distress  LUNGS: Clear to auscultation bilaterally   HEART: S1/S2 present. RRR.   ABD: Soft, non-tender, non-distended. Bowel sounds present  EXT: NC/NC/NE/2+PP/XAVIER  NEURO: AAOX3

## 2018-06-02 NOTE — CONSULT NOTE ADULT - PROBLEM SELECTOR RECOMMENDATION 9
Medical treatment.  Start labetolol 100 mg po q8h and taper drip.  High risk for surgical intervention.

## 2018-06-02 NOTE — PROGRESS NOTE ADULT - ASSESSMENT
84 y/o F with PMH of aortic aneurysm (diagnosed February 2017), DVT (diagnosed March, 2017) on eliquis, HLD, HTN, and dementia presented for acute onset of right chest pain, jaw pain, and R. arm paresthesias found to have worsening of aortic dissection    #) Ascending aortic dissection (type A)  - c/w labetalol for a target SBP of 110-130 and HR of around 60 as per Dr. Bradley's recommendations.  - f/u 2d echo,   - trend cardiac enzymes (0.12 <-- 0.01)  - CT Sx following: no surgical intervention given patient overall condition, medical management only    #) Hx of deep vein thrombosis  - hold Eliquis in view of active dissection    #) Hypertension  - hold Lisinopril now on labetalol infusion for bp management    #) Dementia  - c/w Namenda    #) Dyslipidemia  - c/w Lipitor 80mg    #) Disposition  - from home    #) Full code status

## 2018-06-03 LAB
ANION GAP SERPL CALC-SCNC: 17 MMOL/L — HIGH (ref 7–14)
BASOPHILS # BLD AUTO: 0.01 K/UL — SIGNIFICANT CHANGE UP (ref 0–0.2)
BASOPHILS NFR BLD AUTO: 0.1 % — SIGNIFICANT CHANGE UP (ref 0–1)
BUN SERPL-MCNC: 28 MG/DL — HIGH (ref 10–20)
CALCIUM SERPL-MCNC: 8.4 MG/DL — LOW (ref 8.5–10.1)
CHLORIDE SERPL-SCNC: 106 MMOL/L — SIGNIFICANT CHANGE UP (ref 98–110)
CO2 SERPL-SCNC: 18 MMOL/L — SIGNIFICANT CHANGE UP (ref 17–32)
CREAT SERPL-MCNC: 1.3 MG/DL — SIGNIFICANT CHANGE UP (ref 0.7–1.5)
EOSINOPHIL # BLD AUTO: 0 K/UL — SIGNIFICANT CHANGE UP (ref 0–0.7)
EOSINOPHIL NFR BLD AUTO: 0 % — SIGNIFICANT CHANGE UP (ref 0–8)
GLUCOSE SERPL-MCNC: 125 MG/DL — HIGH (ref 70–99)
HCT VFR BLD CALC: 28 % — LOW (ref 37–47)
HGB BLD-MCNC: 8.9 G/DL — LOW (ref 12–16)
IMM GRANULOCYTES NFR BLD AUTO: 0.5 % — HIGH (ref 0.1–0.3)
LYMPHOCYTES # BLD AUTO: 0.39 K/UL — LOW (ref 1.2–3.4)
LYMPHOCYTES # BLD AUTO: 3 % — LOW (ref 20.5–51.1)
MAGNESIUM SERPL-MCNC: 1.8 MG/DL — SIGNIFICANT CHANGE UP (ref 1.8–2.4)
MCHC RBC-ENTMCNC: 28.8 PG — SIGNIFICANT CHANGE UP (ref 27–31)
MCHC RBC-ENTMCNC: 31.8 G/DL — LOW (ref 32–37)
MCV RBC AUTO: 90.6 FL — SIGNIFICANT CHANGE UP (ref 81–99)
MONOCYTES # BLD AUTO: 1.47 K/UL — HIGH (ref 0.1–0.6)
MONOCYTES NFR BLD AUTO: 11.3 % — HIGH (ref 1.7–9.3)
NEUTROPHILS # BLD AUTO: 11.08 K/UL — HIGH (ref 1.4–6.5)
NEUTROPHILS NFR BLD AUTO: 85.1 % — HIGH (ref 42.2–75.2)
NRBC # BLD: 0 /100 WBCS — SIGNIFICANT CHANGE UP (ref 0–0)
PLATELET # BLD AUTO: 180 K/UL — SIGNIFICANT CHANGE UP (ref 130–400)
POTASSIUM SERPL-MCNC: 4.5 MMOL/L — SIGNIFICANT CHANGE UP (ref 3.5–5)
POTASSIUM SERPL-SCNC: 4.5 MMOL/L — SIGNIFICANT CHANGE UP (ref 3.5–5)
RBC # BLD: 3.09 M/UL — LOW (ref 4.2–5.4)
RBC # FLD: 13.6 % — SIGNIFICANT CHANGE UP (ref 11.5–14.5)
SODIUM SERPL-SCNC: 141 MMOL/L — SIGNIFICANT CHANGE UP (ref 135–146)
TROPONIN T SERPL-MCNC: 0.13 NG/ML — CRITICAL HIGH
WBC # BLD: 13.02 K/UL — HIGH (ref 4.8–10.8)
WBC # FLD AUTO: 13.02 K/UL — HIGH (ref 4.8–10.8)

## 2018-06-03 RX ADMIN — MEMANTINE HYDROCHLORIDE 10 MILLIGRAM(S): 10 TABLET ORAL at 21:23

## 2018-06-03 RX ADMIN — Medication 650 MILLIGRAM(S): at 22:19

## 2018-06-03 RX ADMIN — Medication 100 MILLIGRAM(S): at 06:08

## 2018-06-03 RX ADMIN — Medication 650 MILLIGRAM(S): at 21:49

## 2018-06-03 RX ADMIN — LAMOTRIGINE 100 MILLIGRAM(S): 25 TABLET, ORALLY DISINTEGRATING ORAL at 12:51

## 2018-06-03 RX ADMIN — Medication 1 MILLIGRAM(S): at 12:51

## 2018-06-03 RX ADMIN — MEMANTINE HYDROCHLORIDE 5 MILLIGRAM(S): 10 TABLET ORAL at 12:51

## 2018-06-03 RX ADMIN — ATORVASTATIN CALCIUM 80 MILLIGRAM(S): 80 TABLET, FILM COATED ORAL at 21:23

## 2018-06-03 NOTE — PROGRESS NOTE ADULT - ASSESSMENT
Assessment and Recommendation:   Problem/Recommendation - 1:  Problem: Ascending aortic dissection. Recommendation: Medical treatment.  Continue labetolol 100 mg po q8h.If SBP drops below <90 then decrease to 100 mg q12h.  High risk for surgical intervention.    Problem/Recommendation - 2:  ·  Problem: Hypertension, unspecified type.  Recommendation: Control with Labetolol.     Problem/Recommendation - 3:  ·  Problem: History of deep vein thrombosis.  Recommendation: D/C eliquis.     Problem/Recommendation - 4:  ·  Problem: Dementia.  Recommendation: supportive care.

## 2018-06-03 NOTE — PROGRESS NOTE ADULT - SUBJECTIVE AND OBJECTIVE BOX
TIMUR SCAR  85y  Female    Patient is a 85y old  Female who presents with a chief complaint of R. Chest pain, jaw pain, and R. arm paresthesias (01 Jun 2018 15:15)      INTERVAL HPI/OVERNIGHT EVENTS: None- family asleep at BS    T(C): 36.7 (06-03-18 @ 00:00), Max: 36.8 (06-02-18 @ 08:28)  HR: 48 (06-03-18 @ 01:00) (44 - 51)  BP: 112/53 (06-02-18 @ 08:28) (112/53 - 112/53)  RR: 20 (06-03-18 @ 01:00) (14 - 34)  SpO2: 97% (06-03-18 @ 01:00) (97% - 99%)  Wt(kg): --Vital Signs Last 24 Hrs  T(C): 36.7 (03 Jun 2018 00:00), Max: 36.8 (02 Jun 2018 08:28)  T(F): 98 (03 Jun 2018 00:00), Max: 98.3 (02 Jun 2018 08:28)  HR: 48 (03 Jun 2018 01:00) (44 - 51)  BP: 112/53 (02 Jun 2018 08:28) (112/53 - 112/53)  BP(mean): --  RR: 20 (03 Jun 2018 01:00) (14 - 34)  SpO2: 97% (03 Jun 2018 01:00) (97% - 99%)    PHYSICAL EXAM:  GENERAL: NAD, well-groomed, well-developed  HEAD:  Atraumatic, Normocephalic  NECK: Supple, No JVD, Normal thyroid  NERVOUS SYSTEM:  Alert & Oriented X3, Good concentration; Motor Strength 5/5 B/L upper and lower extremities; DTRs 2+ intact and symmetric  CHEST/LUNG: Clear to percussion bilaterally; No rales, rhonchi, wheezing, or rubs  HEART: Regular rate and rhythm; No murmurs, rubs, or gallops  ABDOMEN: Soft, Nontender, Nondistended; Bowel sounds present  EXTREMITIES:  2+ Peripheral Pulses, No clubbing, cyanosis, or edema    Consultant(s) Notes Reviewed:  [x ] YES  [ ] NO    Discussed with Consultants/Other Providers/Residents [ x] YES     LABS                         8.9    10.07 )-----------( 205      ( 02 Jun 2018 04:45 )             28.0     06-02    141  |  106  |  30<H>  ----------------------------<  126<H>  4.3   |  18  |  1.2    Ca    8.3<L>      02 Jun 2018 04:45  Mg     1.7     06-02    TPro  6.6  /  Alb  4.0  /  TBili  0.3  /  DBili  x   /  AST  21  /  ALT  11  /  AlkPhos  78  06-01    PT/INR - ( 01 Jun 2018 09:12 )   PT: 15.50 sec;   INR: 1.42 ratio         PTT - ( 01 Jun 2018 09:12 )  PTT:27.5 sec      LIVER FUNCTIONS - ( 01 Jun 2018 09:12 )  Alb: 4.0 g/dL / Pro: 6.6 g/dL / ALK PHOS: 78 U/L / ALT: 11 U/L / AST: 21 U/L / GGT: x           CAPILLARY BLOOD GLUCOSE            RADIOLOGY & ADDITIONAL TESTS:    Imaging Personally Reviewed:  [x ] YES  [ ] NO    MEDICATIONS  (STANDING):  atorvastatin 80 milliGRAM(s) Oral at bedtime  folic acid 1 milliGRAM(s) Oral daily  furosemide    Tablet 20 milliGRAM(s) Oral <User Schedule>  labetalol 100 milliGRAM(s) Oral three times a day  labetalol Infusion 0.5 mG/Min (7.5 mL/Hr) IV Continuous <Continuous>  lamoTRIgine 100 milliGRAM(s) Oral daily  memantine 5 milliGRAM(s) Oral daily  memantine 10 milliGRAM(s) Oral at bedtime    MEDICATIONS  (PRN):  acetaminophen   Tablet. 650 milliGRAM(s) Oral every 6 hours PRN Headaches      HEALTH ISSUES - PROBLEM Dx:  Dyslipidemia: Dyslipidemia  Dementia: Dementia  Hypertension, unspecified type: Hypertension, unspecified type  History of deep vein thrombosis: History of deep vein thrombosis  Ascending aortic dissection: Ascending aortic dissection

## 2018-06-03 NOTE — PROGRESS NOTE ADULT - ASSESSMENT
86 y/o F with PMH of aortic aneurysm (diagnosed February 2017), DVT (diagnosed March, 2017) on eliquis, HLD, HTN, and dementia presented for acute onset of right chest pain, jaw pain, and R. arm paresthesias found to have worsening of aortic dissection    #) Ascending aortic dissection (type A)  -c/w labetalol for a target SBP of 110-130 as per Dr. Bradley's recommendations.  -f/u echo report  - trend cardiac enzymes   - CT Sx following: no surgical intervention given patient overall condition, medical management only    #) Hx of deep vein thrombosis  - hold Eliquis in view of active dissection    #) Hypertension  -per cardio recs, labetalol 100mg po q8h adn taper drip    #) Dementia  - c/w Namenda    #) Dyslipidemia  - c/w Lipitor 80mg    #) Disposition  - from home    #) Full code status

## 2018-06-03 NOTE — PROGRESS NOTE ADULT - SUBJECTIVE AND OBJECTIVE BOX
SUBJ:  She is feeling better, BP is stable    MEDICATIONS  (STANDING):  atorvastatin 80 milliGRAM(s) Oral at bedtime  folic acid 1 milliGRAM(s) Oral daily  furosemide    Tablet 20 milliGRAM(s) Oral <User Schedule>  labetalol 100 milliGRAM(s) Oral three times a day  labetalol Infusion 0.5 mG/Min (7.5 mL/Hr) IV Continuous <Continuous>  lamoTRIgine 100 milliGRAM(s) Oral daily  memantine 5 milliGRAM(s) Oral daily  memantine 10 milliGRAM(s) Oral at bedtime    MEDICATIONS  (PRN):  acetaminophen   Tablet. 650 milliGRAM(s) Oral every 6 hours PRN Headaches            Vital Signs Last 24 Hrs  T(C): 36.6 (03 Jun 2018 08:00), Max: 36.7 (02 Jun 2018 16:00)  T(F): 97.9 (03 Jun 2018 08:00), Max: 98.1 (03 Jun 2018 04:00)  HR: 52 (03 Jun 2018 09:00) (44 - 52)  BP: 106/43 (03 Jun 2018 08:00) (106/43 - 106/43)  BP(mean): --  RR: 22 (03 Jun 2018 09:00) (14 - 38)  SpO2: 96% (03 Jun 2018 09:00) (95% - 99%)    PHYSICAL EXAM:  · CONSTITUTIONAL:	Well-developed, well nourished    BMI-  ·RESPIRATORY:   airway patent; breath sounds equal; good air movement; respirations non-labored; clear to auscultation bilaterally; no chest wall tenderness; no intercostal retractions; no rales, rhonchi or wheeze  · CARDIOVASCULAR	regular rate and rhythm  no rub  no murmur  normal PMI  · EXTREMITIES: No cyanosis, clubbing or edema  · VASCULAR: 	Equal and normal pulses (carotid, femoral, dorsalis pedis)  	    LABS:                        8.9    13.02 )-----------( 180      ( 03 Jun 2018 04:19 )             28.0     06-03    141  |  106  |  28<H>  ----------------------------<  125<H>  4.5   |  18  |  1.3    Ca    8.4<L>      03 Jun 2018 04:19  Mg     1.8     06-03      CARDIAC MARKERS ( 03 Jun 2018 04:19 )  x     / 0.13 ng/mL / x     / x     / x      CARDIAC MARKERS ( 02 Jun 2018 04:45 )  x     / 0.17 ng/mL / 41 U/L / x     / 3.7 ng/mL  CARDIAC MARKERS ( 01 Jun 2018 21:07 )  x     / 0.12 ng/mL / 36 U/L / x     / 4.2 ng/mL          I&O's Summary    02 Jun 2018 07:01  -  03 Jun 2018 07:00  --------------------------------------------------------  IN: 134 mL / OUT: 0 mL / NET: 134 mL    03 Jun 2018 07:01  -  03 Jun 2018 09:16  --------------------------------------------------------  IN: 0 mL / OUT: 100 mL / NET: -100 mL      BNP  RADIOLOGY & ADDITIONAL STUDIES:    IMPRESSION AND PLAN:

## 2018-06-03 NOTE — CHART NOTE - NSCHARTNOTEFT_GEN_A_CORE
Called to bedside by rn, as a-line as not giving a reading. no blood return from a line. a line dc'd.

## 2018-06-03 NOTE — PROGRESS NOTE ADULT - SUBJECTIVE AND OBJECTIVE BOX
Patient is a 85y old Female who presents with a chief complaint of R. Chest pain, jaw pain, and R. arm paresthesias (01 Jun 2018 15:15)    Currently admitted to medicine with the primary diagnosis of Chest pain    Today is hospital day 2d.    BRIEF HOSPITAL COURSE:     EVENTS LAST 24HRS:   no acute overnight events  PAST MEDICAL & SURGICAL HISTORY  Dyslipidemia  Dementia  Thoracic aortic dissection  History of deep vein thrombosis  Hypertension, unspecified type  Status post hip surgery  H/O rotator cuff tear  H/O partial thyroidectomy      SOCIAL HISTORY:      REVIEW OF SYSTEMS:  See HPI    ALLERGIES:  NSAIDs (Hives; Urticaria)    MEDICATIONS:  STANDING MEDICATIONS  atorvastatin 80 milliGRAM(s) Oral at bedtime  folic acid 1 milliGRAM(s) Oral daily  furosemide    Tablet 20 milliGRAM(s) Oral <User Schedule>  labetalol 100 milliGRAM(s) Oral three times a day  labetalol Infusion 0.5 mG/Min IV Continuous <Continuous>  lamoTRIgine 100 milliGRAM(s) Oral daily  memantine 5 milliGRAM(s) Oral daily  memantine 10 milliGRAM(s) Oral at bedtime    PRN MEDICATIONS  acetaminophen   Tablet. 650 milliGRAM(s) Oral every 6 hours PRN    VITALS:         ICU Vital Signs Last 24 Hrs:    T(C): 36.7 (03 Jun 2018 04:00), Max: 36.8 (02 Jun 2018 08:28)  T(F): 98.1 (03 Jun 2018 04:00), Max: 98.3 (02 Jun 2018 08:28)  HR: 50 (03 Jun 2018 07:00) (44 - 52)  BP: 112/53 (02 Jun 2018 08:28) (112/53 - 112/53)  BP(mean): --  ABP: 111/56 (03 Jun 2018 07:00) (98/44 - 148/58)  ABP(mean): 77 (03 Jun 2018 07:00) (72 - 90)  RR: 20 (03 Jun 2018 07:00) (14 - 38)  SpO2: 96% (03 Jun 2018 07:00) (95% - 99%)          I&O's Detail:      02 Jun 2018 07:01  -  03 Jun 2018 07:00  --------------------------------------------------------  IN:    Labetalol Infusion: 5 mL    Labetalol Infusion: 129 mL  Total IN: 134 mL    OUT:  Total OUT: 0 mL    Total NET: 134 mL          LABS:                        8.9    13.02 )-----------( 180      ( 03 Jun 2018 04:19 )             28.0     06-03    141  |  106  |  28<H>  ----------------------------<  125<H>  4.5   |  18  |  1.3    Ca    8.4<L>      03 Jun 2018 04:19  Mg     1.8     06-03    TPro  6.6  /  Alb  4.0  /  TBili  0.3  /  DBili  x   /  AST  21  /  ALT  11  /  AlkPhos  78  06-01      CARDIAC MARKERS ( 03 Jun 2018 04:19 )  x     / 0.13 ng/mL / x     / x     / x      CARDIAC MARKERS ( 02 Jun 2018 04:45 )  x     / 0.17 ng/mL / 41 U/L / x     / 3.7 ng/mL  CARDIAC MARKERS ( 01 Jun 2018 21:07 )  x     / 0.12 ng/mL / 36 U/L / x     / 4.2 ng/mL  CARDIAC MARKERS ( 01 Jun 2018 09:12 )  x     / <0.01 ng/mL / 41 U/L / x     / x          CAPILLARY BLOOD GLUCOSE        PT/INR - ( 01 Jun 2018 09:12 )   PT: 15.50 sec;   INR: 1.42 ratio         PTT - ( 01 Jun 2018 09:12 )  PTT:27.5 sec    CULTURES:      PHYSICAL EXAM:  GEN: No acute distress  LUNGS: CTABL  HEART: +S1,S2  RRR  ABD: Soft, non-tender, non-distended  EXT: moves all extremities  NEURO: alert, oriented

## 2018-06-04 LAB
ALBUMIN SERPL ELPH-MCNC: 3.2 G/DL — LOW (ref 3.5–5.2)
ALP SERPL-CCNC: 66 U/L — SIGNIFICANT CHANGE UP (ref 30–115)
ALT FLD-CCNC: 8 U/L — SIGNIFICANT CHANGE UP (ref 0–41)
ANION GAP SERPL CALC-SCNC: 16 MMOL/L — HIGH (ref 7–14)
AST SERPL-CCNC: 12 U/L — SIGNIFICANT CHANGE UP (ref 0–41)
BILIRUB SERPL-MCNC: 0.5 MG/DL — SIGNIFICANT CHANGE UP (ref 0.2–1.2)
BUN SERPL-MCNC: 32 MG/DL — HIGH (ref 10–20)
CALCIUM SERPL-MCNC: 7.9 MG/DL — LOW (ref 8.5–10.1)
CHLORIDE SERPL-SCNC: 103 MMOL/L — SIGNIFICANT CHANGE UP (ref 98–110)
CO2 SERPL-SCNC: 18 MMOL/L — SIGNIFICANT CHANGE UP (ref 17–32)
CREAT SERPL-MCNC: 1.5 MG/DL — SIGNIFICANT CHANGE UP (ref 0.7–1.5)
GLUCOSE SERPL-MCNC: 99 MG/DL — SIGNIFICANT CHANGE UP (ref 70–99)
HCT VFR BLD CALC: 26.7 % — LOW (ref 37–47)
HCT VFR BLD CALC: 28.6 % — LOW (ref 37–47)
HGB BLD-MCNC: 8.3 G/DL — LOW (ref 12–16)
HGB BLD-MCNC: 8.8 G/DL — LOW (ref 12–16)
MAGNESIUM SERPL-MCNC: 1.9 MG/DL — SIGNIFICANT CHANGE UP (ref 1.8–2.4)
MCHC RBC-ENTMCNC: 28.4 PG — SIGNIFICANT CHANGE UP (ref 27–31)
MCHC RBC-ENTMCNC: 28.5 PG — SIGNIFICANT CHANGE UP (ref 27–31)
MCHC RBC-ENTMCNC: 30.8 G/DL — LOW (ref 32–37)
MCHC RBC-ENTMCNC: 31.1 G/DL — LOW (ref 32–37)
MCV RBC AUTO: 91.8 FL — SIGNIFICANT CHANGE UP (ref 81–99)
MCV RBC AUTO: 92.3 FL — SIGNIFICANT CHANGE UP (ref 81–99)
NRBC # BLD: 0 /100 WBCS — SIGNIFICANT CHANGE UP (ref 0–0)
NRBC # BLD: 0 /100 WBCS — SIGNIFICANT CHANGE UP (ref 0–0)
PLATELET # BLD AUTO: 176 K/UL — SIGNIFICANT CHANGE UP (ref 130–400)
PLATELET # BLD AUTO: 189 K/UL — SIGNIFICANT CHANGE UP (ref 130–400)
POTASSIUM SERPL-MCNC: 4.5 MMOL/L — SIGNIFICANT CHANGE UP (ref 3.5–5)
POTASSIUM SERPL-SCNC: 4.5 MMOL/L — SIGNIFICANT CHANGE UP (ref 3.5–5)
PROT SERPL-MCNC: 5.1 G/DL — LOW (ref 6–8)
RBC # BLD: 2.91 M/UL — LOW (ref 4.2–5.4)
RBC # BLD: 3.1 M/UL — LOW (ref 4.2–5.4)
RBC # FLD: 14.2 % — SIGNIFICANT CHANGE UP (ref 11.5–14.5)
RBC # FLD: 14.3 % — SIGNIFICANT CHANGE UP (ref 11.5–14.5)
SODIUM SERPL-SCNC: 137 MMOL/L — SIGNIFICANT CHANGE UP (ref 135–146)
TROPONIN T SERPL-MCNC: 0.15 NG/ML — CRITICAL HIGH
WBC # BLD: 12.37 K/UL — HIGH (ref 4.8–10.8)
WBC # BLD: 13.32 K/UL — HIGH (ref 4.8–10.8)
WBC # FLD AUTO: 12.37 K/UL — HIGH (ref 4.8–10.8)
WBC # FLD AUTO: 13.32 K/UL — HIGH (ref 4.8–10.8)

## 2018-06-04 RX ORDER — SODIUM CHLORIDE 9 MG/ML
500 INJECTION, SOLUTION INTRAVENOUS ONCE
Qty: 0 | Refills: 0 | Status: COMPLETED | OUTPATIENT
Start: 2018-06-04 | End: 2018-06-04

## 2018-06-04 RX ORDER — LABETALOL HCL 100 MG
50 TABLET ORAL
Qty: 0 | Refills: 0 | Status: DISCONTINUED | OUTPATIENT
Start: 2018-06-04 | End: 2018-06-04

## 2018-06-04 RX ORDER — LABETALOL HCL 100 MG
100 TABLET ORAL
Qty: 0 | Refills: 0 | Status: DISCONTINUED | OUTPATIENT
Start: 2018-06-04 | End: 2018-06-05

## 2018-06-04 RX ORDER — LABETALOL HCL 100 MG
100 TABLET ORAL
Qty: 0 | Refills: 0 | Status: DISCONTINUED | OUTPATIENT
Start: 2018-06-04 | End: 2018-06-04

## 2018-06-04 RX ORDER — SODIUM CHLORIDE 9 MG/ML
500 INJECTION INTRAMUSCULAR; INTRAVENOUS; SUBCUTANEOUS ONCE
Qty: 0 | Refills: 0 | Status: COMPLETED | OUTPATIENT
Start: 2018-06-04 | End: 2018-06-04

## 2018-06-04 RX ADMIN — Medication 650 MILLIGRAM(S): at 11:51

## 2018-06-04 RX ADMIN — Medication 650 MILLIGRAM(S): at 11:23

## 2018-06-04 RX ADMIN — Medication 20 MILLIGRAM(S): at 11:23

## 2018-06-04 RX ADMIN — Medication 1 MILLIGRAM(S): at 11:23

## 2018-06-04 RX ADMIN — Medication 650 MILLIGRAM(S): at 18:12

## 2018-06-04 RX ADMIN — LAMOTRIGINE 100 MILLIGRAM(S): 25 TABLET, ORALLY DISINTEGRATING ORAL at 11:51

## 2018-06-04 RX ADMIN — SODIUM CHLORIDE 1000 MILLILITER(S): 9 INJECTION, SOLUTION INTRAVENOUS at 00:46

## 2018-06-04 RX ADMIN — SODIUM CHLORIDE 1000 MILLILITER(S): 9 INJECTION INTRAMUSCULAR; INTRAVENOUS; SUBCUTANEOUS at 14:00

## 2018-06-04 RX ADMIN — ATORVASTATIN CALCIUM 80 MILLIGRAM(S): 80 TABLET, FILM COATED ORAL at 21:47

## 2018-06-04 RX ADMIN — MEMANTINE HYDROCHLORIDE 10 MILLIGRAM(S): 10 TABLET ORAL at 21:47

## 2018-06-04 RX ADMIN — MEMANTINE HYDROCHLORIDE 5 MILLIGRAM(S): 10 TABLET ORAL at 11:23

## 2018-06-04 NOTE — PROGRESS NOTE ADULT - SUBJECTIVE AND OBJECTIVE BOX
TIMUR SCAR  85y  Female    Patient is a 85y old  Female who presents with a chief complaint of R. Chest pain, jaw pain, and R. arm paresthesias (01 Jun 2018 15:15)      INTERVAL HPI/OVERNIGHT EVENTS: Hypotension    T(C): 36.3 (06-04-18 @ 07:41), Max: 36.7 (06-04-18 @ 00:00)  HR: 54 (06-04-18 @ 13:30) (52 - 68)  BP: 74/52 (06-04-18 @ 13:30) (74/52 - 122/47)  RR: 31 (06-04-18 @ 13:30) (19 - 37)  SpO2: 96% (06-04-18 @ 13:30) (90% - 100%)  Wt(kg): --Vital Signs Last 24 Hrs  T(C): 36.3 (04 Jun 2018 07:41), Max: 36.7 (04 Jun 2018 00:00)  T(F): 97.4 (04 Jun 2018 07:41), Max: 98.1 (04 Jun 2018 00:00)  HR: 54 (04 Jun 2018 13:30) (52 - 68)  BP: 74/52 (04 Jun 2018 13:30) (74/52 - 122/47)  BP(mean): 67 (04 Jun 2018 11:10) (50 - 80)  RR: 31 (04 Jun 2018 13:30) (19 - 37)  SpO2: 96% (04 Jun 2018 13:30) (90% - 100%)    PHYSICAL EXAM:  GENERAL: NAD, well-groomed, well-developed  HEAD:  Atraumatic, Normocephalic  NECK: Supple, No JVD, Normal thyroid  NERVOUS SYSTEM:  Alert & Oriented X3, Good concentration; Motor Strength 5/5 B/L upper and lower extremities; DTRs 2+ intact and symmetric  CHEST/LUNG: Clear to percussion bilaterally; No rales, rhonchi, wheezing, or rubs  HEART: Regular rate and rhythm; No murmurs, rubs, or gallops  ABDOMEN: Soft, Nontender, Nondistended; Bowel sounds present  EXTREMITIES:  2+ Peripheral Pulses, No clubbing, cyanosis, or edema    Consultant(s) Notes Reviewed:  [x ] YES  [ ] NO    Discussed with Consultants/Other Providers/Residents [ x] YES     LABS                         8.3    12.37 )-----------( 176      ( 04 Jun 2018 04:28 )             26.7     06-04    137  |  103  |  32<H>  ----------------------------<  99  4.5   |  18  |  1.5    Ca    7.9<L>      04 Jun 2018 04:28  Mg     1.9     06-04    TPro  5.1<L>  /  Alb  3.2<L>  /  TBili  0.5  /  DBili  x   /  AST  12  /  ALT  8   /  AlkPhos  66  06-04          LIVER FUNCTIONS - ( 04 Jun 2018 04:28 )  Alb: 3.2 g/dL / Pro: 5.1 g/dL / ALK PHOS: 66 U/L / ALT: 8 U/L / AST: 12 U/L / GGT: x           CAPILLARY BLOOD GLUCOSE            RADIOLOGY & ADDITIONAL TESTS:    Imaging Personally Reviewed:  [x ] YES  [ ] NO    MEDICATIONS  (STANDING):  atorvastatin 80 milliGRAM(s) Oral at bedtime  deplin 15 milliGRAM(s) 15 milliGRAM(s) Oral daily  folic acid 1 milliGRAM(s) Oral daily  furosemide    Tablet 20 milliGRAM(s) Oral <User Schedule>  labetalol 100 milliGRAM(s) Oral <User Schedule>  labetalol 50 milliGRAM(s) Oral <User Schedule>  lamoTRIgine 100 milliGRAM(s) Oral daily  memantine 5 milliGRAM(s) Oral daily  memantine 10 milliGRAM(s) Oral at bedtime  sodium chloride 0.9% Bolus 500 milliLiter(s) IV Bolus once    MEDICATIONS  (PRN):  acetaminophen   Tablet. 650 milliGRAM(s) Oral every 6 hours PRN Headaches      HEALTH ISSUES - PROBLEM Dx:  Dyslipidemia: Dyslipidemia  Dementia: Dementia  Hypertension, unspecified type: Hypertension, unspecified type  History of deep vein thrombosis: History of deep vein thrombosis  Ascending aortic dissection: Ascending aortic dissection

## 2018-06-04 NOTE — PROGRESS NOTE ADULT - SUBJECTIVE AND OBJECTIVE BOX
Patient is a 85y old Female who presents with a chief complaint of R. Chest pain, jaw pain, and R. arm paresthesias (01 Jun 2018 15:15)    Currently admitted to medicine with the primary diagnosis of Chest pain    Today is hospital day 3d.    BRIEF HOSPITAL COURSE:   86 y/o F with PMH of aortic aneurysm (diagnosed February 2017), DVT (diagnosed March, 2017) on eliquis, HTN, HLD, and dementia presented for acute onset of right chest pain, jaw pain, and R. arm paresthesias. She states she woke up around 6am the day of presentation with the pain. It feels like a tightness in her chest and a numbness/tingling sensation traveling from her right shoulder down the entire arm to her fingers. Symptoms persisted since onset and are still present at the time of admission. Vascular surgery was consulted in the ED, who recommended a CTA to look for aortic dissection. The test came back positive for ascending thoracic aortic dissection and aneurysm with intramural hematoma significantly worsened from the prior study of February 2017.  As per ED, discussion between CT surgery team and family ended in opting out of a surgical intervention so she was admitted to the Coronary Care Unit for medical management.    EVENTS LAST 24HRS:   A-line d/c'd. pt and family requested copy of CT results for possible second opinion    PAST MEDICAL & SURGICAL HISTORY  Dyslipidemia  Dementia  Thoracic aortic dissection  History of deep vein thrombosis  Hypertension, unspecified type  Status post hip surgery  H/O rotator cuff tear  H/O partial thyroidectomy      SOCIAL HISTORY:      REVIEW OF SYSTEMS:  See HPI    ALLERGIES:  NSAIDs (Hives; Urticaria)    MEDICATIONS:  STANDING MEDICATIONS  atorvastatin 80 milliGRAM(s) Oral at bedtime  folic acid 1 milliGRAM(s) Oral daily  furosemide    Tablet 20 milliGRAM(s) Oral <User Schedule>  labetalol 100 milliGRAM(s) Oral three times a day  lamoTRIgine 100 milliGRAM(s) Oral daily  memantine 5 milliGRAM(s) Oral daily  memantine 10 milliGRAM(s) Oral at bedtime    PRN MEDICATIONS  acetaminophen   Tablet. 650 milliGRAM(s) Oral every 6 hours PRN    VITALS:         ICU Vital Signs Last 24 Hrs:    T(C): 36.7 (04 Jun 2018 04:00), Max: 36.7 (03 Jun 2018 12:00)  T(F): 98 (04 Jun 2018 04:00), Max: 98.1 (03 Jun 2018 12:00)  HR: 62 (04 Jun 2018 06:00) (50 - 62)  BP: 110/53 (04 Jun 2018 06:00) (74/35 - 116/53)  BP(mean): 77 (04 Jun 2018 06:00) (50 - 77)  ABP: 120/58 (03 Jun 2018 12:00) (100/56 - 120/58)  ABP(mean): --  RR: 29 (04 Jun 2018 06:00) (19 - 37)  SpO2: 96% (04 Jun 2018 06:00) (90% - 100%)          I&O's Detail:      03 Jun 2018 07:01  -  04 Jun 2018 07:00  --------------------------------------------------------  IN:    Lactated Ringers IV Bolus: 500 mL    Oral Fluid: 200 mL  Total IN: 700 mL    OUT:    Voided: 100 mL  Total OUT: 100 mL    Total NET: 600 mL          LABS:                        8.3    12.37 )-----------( 176      ( 04 Jun 2018 04:28 )             26.7     06-04    137  |  103  |  32<H>  ----------------------------<  99  4.5   |  18  |  1.5    Ca    7.9<L>      04 Jun 2018 04:28  Mg     1.9     06-04    TPro  5.1<L>  /  Alb  3.2<L>  /  TBili  0.5  /  DBili  x   /  AST  12  /  ALT  8   /  AlkPhos  66  06-04      CARDIAC MARKERS ( 04 Jun 2018 04:28 )  x     / 0.15 ng/mL / x     / x     / x      CARDIAC MARKERS ( 03 Jun 2018 04:19 )  x     / 0.13 ng/mL / x     / x     / x          CAPILLARY BLOOD GLUCOSE        CULTURES:      PHYSICAL EXAM:  General: No acute distress.  Alert, oriented, interactive, nonfocal.  HEENT: Pupils equal, reactive to light symmetrically.  PULM: Clear to auscultation bilaterally  CVS: Regular rate and rhythm, no murmurs, rubs, or gallops.  ABD: Soft, nondistended, nontender.  EXT: moves all extremities  SKIN: Warm, dry    RADIOLOGY:   6/4 cxr reviewed

## 2018-06-04 NOTE — PROGRESS NOTE ADULT - SUBJECTIVE AND OBJECTIVE BOX
SUBJ:  She is resting in bed comfortably.    MEDICATIONS  (STANDING):  atorvastatin 80 milliGRAM(s) Oral at bedtime  deplin 15 milliGRAM(s) 15 milliGRAM(s) Oral daily  folic acid 1 milliGRAM(s) Oral daily  furosemide    Tablet 20 milliGRAM(s) Oral <User Schedule>  labetalol 100 milliGRAM(s) Oral <User Schedule>  labetalol 50 milliGRAM(s) Oral <User Schedule>  lamoTRIgine 100 milliGRAM(s) Oral daily  memantine 5 milliGRAM(s) Oral daily  memantine 10 milliGRAM(s) Oral at bedtime      Vital Signs Last 24 Hrs  T(C): 35.8 (04 Jun 2018 14:00), Max: 36.7 (04 Jun 2018 00:00)  T(F): 96.4 (04 Jun 2018 14:00), Max: 98.1 (04 Jun 2018 00:00)  HR: 58 (04 Jun 2018 18:00) (52 - 68)  BP: 102/40 (04 Jun 2018 18:00) (74/52 - 122/47)  BP(mean): 61 (04 Jun 2018 18:00) (50 - 80)  RR: 29 (04 Jun 2018 18:00) (19 - 37)  SpO2: 95% (04 Jun 2018 18:00) (90% - 100%)     PHYSICAL EXAM:  · CONSTITUTIONAL:	Well-developed, well nourished    BMI-  ·RESPIRATORY:   airway patent; breath sounds equal; good air movement; respirations non-labored; clear to auscultation bilaterally; no chest wall tenderness; no intercostal retractions; no rales, rhonchi or wheeze  · CARDIOVASCULAR	regular rate and rhythm  no rub  no murmur  normal PMI  · EXTREMITIES: No cyanosis, clubbing or edema  · VASCULAR: 	Equal and normal pulses (carotid, femoral, dorsalis pedis)  	  LABS:                        8.3    12.37 )-----------( 176      ( 04 Jun 2018 04:28 )             26.7     06-04    137  |  103  |  32<H>  ----------------------------<  99  4.5   |  18  |  1.5    Ca    7.9<L>      04 Jun 2018 04:28  Mg     1.9     06-04    TPro  5.1<L>  /  Alb  3.2<L>  /  TBili  0.5  /  DBili  x   /  AST  12  /  ALT  8   /  AlkPhos  66  06-04    CARDIAC MARKERS ( 04 Jun 2018 04:28 )  x     / 0.15 ng/mL / x     / x     / x      CARDIAC MARKERS ( 03 Jun 2018 04:19 )  x     / 0.13 ng/mL / x     / x     / x              I&O's Summary    03 Jun 2018 07:01  -  04 Jun 2018 07:00  --------------------------------------------------------  IN: 700 mL / OUT: 100 mL / NET: 600 mL    04 Jun 2018 07:01  -  04 Jun 2018 19:28  --------------------------------------------------------  IN: 860 mL / OUT: 0 mL / NET: 860 mL    Assessment and Plan:   · Assessment		  Assessment and Recommendation:   Problem/Recommendation - 1:  Problem: Ascending aortic dissection. Recommendation: Medical treatment.  Labetolol 100 mg po q8h is on hold due to hypotension.If BP is >120/80 consider labetolol 50 mg po q12h.  Out of bed to chair and ambulate.  High risk for surgical intervention.    Problem/Recommendation - 2:  ·  Problem: Hypertension, unspecified type.  Recommendation: monitor BP  Problem/Recommendation - 3:  ·  Problem: History of deep vein thrombosis.  Recommendation: D/C eliquis.     Problem/Recommendation - 4:  ·  Problem: Dementia.  Recommendation: supportive care.

## 2018-06-04 NOTE — PROGRESS NOTE ADULT - ASSESSMENT
84 y/o F with PMH of aortic aneurysm (diagnosed February 2017), DVT (diagnosed March, 2017) on eliquis, HLD, HTN, and dementia presented for acute onset of right chest pain, jaw pain, and R. arm paresthesias found to have worsening of aortic dissection    1) Ascending aortic dissection (type A)  -c/w labetalol for a target SBP of 110-130 as per Dr. Bradley's recommendations. If SBP <90 decrease to 100mg q12h  -f/u echo report  - trend cardiac enzymes   - CT Sx following: no surgical intervention given patient overall condition, medical management only    2) Hx of deep vein thrombosis  - hold Eliquis 2/2 aortic dissection    3) Hypertension  -per cardio recs, labetalol 100mg po q8h    #) Dementia  - c/w Namenda    #) Dyslipidemia  - c/w Lipitor 80mg    #) Disposition  - from home    #) Full code status

## 2018-06-05 LAB
ANION GAP SERPL CALC-SCNC: 14 MMOL/L — SIGNIFICANT CHANGE UP (ref 7–14)
BUN SERPL-MCNC: 29 MG/DL — HIGH (ref 10–20)
CALCIUM SERPL-MCNC: 7.9 MG/DL — LOW (ref 8.5–10.1)
CHLORIDE SERPL-SCNC: 103 MMOL/L — SIGNIFICANT CHANGE UP (ref 98–110)
CO2 SERPL-SCNC: 20 MMOL/L — SIGNIFICANT CHANGE UP (ref 17–32)
CREAT SERPL-MCNC: 1.5 MG/DL — SIGNIFICANT CHANGE UP (ref 0.7–1.5)
GLUCOSE SERPL-MCNC: 91 MG/DL — SIGNIFICANT CHANGE UP (ref 70–99)
HCT VFR BLD CALC: 29.6 % — LOW (ref 37–47)
HGB BLD-MCNC: 9.1 G/DL — LOW (ref 12–16)
MCHC RBC-ENTMCNC: 28.1 PG — SIGNIFICANT CHANGE UP (ref 27–31)
MCHC RBC-ENTMCNC: 30.7 G/DL — LOW (ref 32–37)
MCV RBC AUTO: 91.4 FL — SIGNIFICANT CHANGE UP (ref 81–99)
NRBC # BLD: 0 /100 WBCS — SIGNIFICANT CHANGE UP (ref 0–0)
PLATELET # BLD AUTO: 199 K/UL — SIGNIFICANT CHANGE UP (ref 130–400)
POTASSIUM SERPL-MCNC: 4.7 MMOL/L — SIGNIFICANT CHANGE UP (ref 3.5–5)
POTASSIUM SERPL-SCNC: 4.7 MMOL/L — SIGNIFICANT CHANGE UP (ref 3.5–5)
RBC # BLD: 3.24 M/UL — LOW (ref 4.2–5.4)
RBC # FLD: 14.3 % — SIGNIFICANT CHANGE UP (ref 11.5–14.5)
SODIUM SERPL-SCNC: 137 MMOL/L — SIGNIFICANT CHANGE UP (ref 135–146)
WBC # BLD: 10.04 K/UL — SIGNIFICANT CHANGE UP (ref 4.8–10.8)
WBC # FLD AUTO: 10.04 K/UL — SIGNIFICANT CHANGE UP (ref 4.8–10.8)

## 2018-06-05 RX ORDER — LABETALOL HCL 100 MG
50 TABLET ORAL EVERY 12 HOURS
Qty: 0 | Refills: 0 | Status: DISCONTINUED | OUTPATIENT
Start: 2018-06-05 | End: 2018-06-06

## 2018-06-05 RX ADMIN — MEMANTINE HYDROCHLORIDE 5 MILLIGRAM(S): 10 TABLET ORAL at 10:54

## 2018-06-05 RX ADMIN — Medication 1 MILLIGRAM(S): at 10:54

## 2018-06-05 RX ADMIN — ATORVASTATIN CALCIUM 80 MILLIGRAM(S): 80 TABLET, FILM COATED ORAL at 21:45

## 2018-06-05 RX ADMIN — Medication 650 MILLIGRAM(S): at 10:55

## 2018-06-05 RX ADMIN — Medication 650 MILLIGRAM(S): at 17:01

## 2018-06-05 RX ADMIN — LAMOTRIGINE 100 MILLIGRAM(S): 25 TABLET, ORALLY DISINTEGRATING ORAL at 10:54

## 2018-06-05 RX ADMIN — Medication 50 MILLIGRAM(S): at 18:32

## 2018-06-05 RX ADMIN — Medication 650 MILLIGRAM(S): at 06:00

## 2018-06-05 RX ADMIN — MEMANTINE HYDROCHLORIDE 10 MILLIGRAM(S): 10 TABLET ORAL at 21:45

## 2018-06-05 RX ADMIN — Medication 100 MILLIGRAM(S): at 05:08

## 2018-06-05 RX ADMIN — Medication 650 MILLIGRAM(S): at 05:38

## 2018-06-05 NOTE — CHART NOTE - NSCHARTNOTEFT_GEN_A_CORE
ICU DOWNGRADE NOTE:    85y Female transferred to floor from ICU    Patient is a 85y old Female who presents with a chief complaint of R. Chest pain, jaw pain, and R. arm paresthesias (01 Jun 2018 15:15)    The patient is currently admitted for the primary diagnosis of Chest pain    The patient was admitted to the unit for 4 Days.    The patient was never intubated for (days), and was never on pressors for (days).    Indwelling vascular catheters: none    Disposition: downgrade to tele     Code Status: Full code    ICU COURSE OF EVENTS:  -------------------------------------------------------------------------------------------  86 y/o F with PMH of aortic aneurysm (diagnosed February 2017), DVT (diagnosed March, 2017) on eliquis, HTN, HLD, and dementia presented for acute onset of right chest pain, jaw pain, and R. arm paresthesias. She states she woke up around 6am the day of presentation with the pain. It feels like a tightness in her chest and a numbness/tingling sensation traveling from her right shoulder down the entire arm to her fingers. Symptoms persisted since onset and are still present at the time of admission. Vascular surgery was consulted in the ED, who recommended a CTA to look for aortic dissection. The test came back positive for ascending thoracic aortic dissection and aneurysm with intramural hematoma significantly worsened from the prior study of February 2017.  As per ED, discussion between CT surgery team and family ended in opting out of a surgical intervention so she was admitted to the Coronary Care Unit for medical management.    Pt has been medically managed with labetalol, originally a drip then transitioned to PO.       -------------------------------------------------------------------------------------------    Current workup in progress:  -f/u echo report  -f/u cardio recs    SIGN OUT AT 06-05-18 @ 10:54 GIVEN TO: ICU DOWNGRADE NOTE:    85y Female transferred to floor from ICU    Patient is a 85y old Female who presents with a chief complaint of R. Chest pain, jaw pain, and R. arm paresthesias (01 Jun 2018 15:15)    The patient is currently admitted for the primary diagnosis of Chest pain    The patient was admitted to the unit for 8 Days.    The patient was never intubated, nor on pressors..    Indwelling vascular catheters: none    Disposition: downgrade to medicine floor     Code Status: Full code    ICU COURSE OF EVENTS:  -------------------------------------------------------------------------------------------  86 y/o F with PMH of aortic aneurysm (diagnosed February 2017), DVT (diagnosed March, 2017) on eliquis, HTN, HLD, and dementia presented for acute onset of right chest pain, jaw pain, and R. arm paresthesias. She states she woke up around 6am the day of presentation with the pain. It feels like a tightness in her chest and a numbness/tingling sensation traveling from her right shoulder down the entire arm to her fingers. Symptoms persisted since onset and are still present at the time of admission. Vascular surgery was consulted in the ED, who recommended a CTA to look for aortic dissection. The test came back positive for ascending thoracic aortic dissection and aneurysm with intramural hematoma significantly worsened from the prior study of February 2017.  As per ED, discussion between CT surgery team and family ended in opting out of a surgical intervention so she was admitted to the Coronary Care Unit for medical management.    Pt has been medically managed with labetalol, originally a drip then transitioned to PO.       -------------------------------------------------------------------------------------------    Current workup in progress:  -f/u echo report  -f/u cardio recs    SIGN OUT AT 06-05-18 @ 10:54 GIVEN TO: ICU DOWNGRADE NOTE:    85y Female transferred to floor from ICU    Patient is a 85y old Female who presents with a chief complaint of R. Chest pain, jaw pain, and R. arm paresthesias (01 Jun 2018 15:15)    The patient is currently admitted for the primary diagnosis of Chest pain    The patient was admitted to the unit for 8 Days.    The patient was never intubated, nor on pressors..    Indwelling vascular catheters: none    Disposition: downgrade to medicine floor     Code Status: Full code    ICU COURSE OF EVENTS:  -------------------------------------------------------------------------------------------  84 y/o F with PMH of aortic aneurysm (diagnosed February 2017), DVT (diagnosed March, 2017) on eliquis, HTN, HLD, and dementia presented for acute onset of right chest pain, jaw pain, and R. arm paresthesias. She states she woke up around 6am the day of presentation with the pain. It feels like a tightness in her chest and a numbness/tingling sensation traveling from her right shoulder down the entire arm to her fingers. Symptoms persisted since onset and are still present at the time of admission. Vascular surgery was consulted in the ED, who recommended a CTA to look for aortic dissection. The test came back positive for ascending thoracic aortic dissection and aneurysm with intramural hematoma significantly worsened from the prior study of February 2017.  As per ED, discussion between CT surgery team and family ended in opting out of a surgical intervention so she was admitted to the Coronary Care Unit for medical management.    Pt has been medically managed with labetalol, originally a drip then transitioned to PO.     Current work up in progress:  -f/u cardiology  -f/u medicine    SIGN OUT AT 06-08-18 @ 10:54 GIVEN TO: ICU DOWNGRADE NOTE:    85y Female transferred to floor from ICU    Patient is a 85y old Female who presents with a chief complaint of R. Chest pain, jaw pain, and R. arm paresthesias (01 Jun 2018 15:15)    The patient is currently admitted for the primary diagnosis of Chest pain    The patient was admitted to the unit for 8 Days.    The patient was never intubated, nor on pressors..    Indwelling vascular catheters: none    Disposition: downgrade to medicine floor     Code Status: Full code    ICU COURSE OF EVENTS:  -------------------------------------------------------------------------------------------  84 y/o F with PMH of aortic aneurysm (diagnosed February 2017), DVT (diagnosed March, 2017) on eliquis, HTN, HLD, and dementia presented for acute onset of right chest pain, jaw pain, and R. arm paresthesias. She states she woke up around 6am the day of presentation with the pain. It feels like a tightness in her chest and a numbness/tingling sensation traveling from her right shoulder down the entire arm to her fingers. Symptoms persisted since onset and are still present at the time of admission. Vascular surgery was consulted in the ED, who recommended a CTA to look for aortic dissection. The test came back positive for ascending thoracic aortic dissection and aneurysm with intramural hematoma significantly worsened from the prior study of February 2017.  As per ED, discussion between CT surgery team and family ended in opting out of a surgical intervention so she was admitted to the Coronary Care Unit for medical management.    Pt has been medically managed with labetalol, originally a drip then transitioned to PO.     Current work up in progress:  -f/u cardiology  -f/u medicine    SIGN OUT AT 06- @ 16:15 TO DR VELÁSQUEZ

## 2018-06-05 NOTE — PROGRESS NOTE ADULT - ASSESSMENT
86 y/o F with PMH of aortic aneurysm (diagnosed February 2017), DVT (diagnosed March, 2017) on eliquis, HLD, HTN, and dementia presented for acute onset of right chest pain, jaw pain, and R. arm paresthesias found to have worsening of aortic dissection    1) Ascending aortic dissection (type A)  -c/w labetalol, dose adjusted for hypotension  -f/u echo report  - CT Sx following: no surgical intervention given patient overall condition, medical management only    2) Hx of deep vein thrombosis  - hold Eliquis 2/2 aortic dissection    3) Hypertension  -per cardio recs, labetalol 50mg q12h    #) Dementia  - c/w Namenda    #) Dyslipidemia  - c/w Lipitor 80mg    #) Disposition  - from home    #) Full code status

## 2018-06-05 NOTE — PROGRESS NOTE ADULT - SUBJECTIVE AND OBJECTIVE BOX
Patient is a 85y old Female who presents with a chief complaint of R. Chest pain, jaw pain, and R. arm paresthesias (01 Jun 2018 15:15)    Currently admitted to medicine with the primary diagnosis of Chest pain    Today is hospital day 4d.    BRIEF HOSPITAL COURSE:   84 y/o F with PMH of aortic aneurysm (diagnosed February 2017), DVT (diagnosed March, 2017) on eliquis, HTN, HLD, and dementia presented for acute onset of right chest pain, jaw pain, and R. arm paresthesias. She states she woke up around 6am the day of presentation with the pain. It feels like a tightness in her chest and a numbness/tingling sensation traveling from her right shoulder down the entire arm to her fingers. Symptoms persisted since onset and are still present at the time of admission. Vascular surgery was consulted in the ED, who recommended a CTA to look for aortic dissection. The test came back positive for ascending thoracic aortic dissection and aneurysm with intramural hematoma significantly worsened from the prior study of February 2017.  As per ED, discussion between CT surgery team and family ended in opting out of a surgical intervention so she was admitted to the Coronary Care Unit for medical management.    EVENTS LAST 24HRS:   no overnight events    PAST MEDICAL & SURGICAL HISTORY  Dyslipidemia  Dementia  Thoracic aortic dissection  History of deep vein thrombosis  Hypertension, unspecified type  Status post hip surgery  H/O rotator cuff tear  H/O partial thyroidectomy      SOCIAL HISTORY:      REVIEW OF SYSTEMS:  See HPI    ALLERGIES:  NSAIDs (Hives; Urticaria)    MEDICATIONS:  STANDING MEDICATIONS  atorvastatin 80 milliGRAM(s) Oral at bedtime  deplin 15 milliGRAM(s) 15 milliGRAM(s) Oral daily  folic acid 1 milliGRAM(s) Oral daily  furosemide    Tablet 20 milliGRAM(s) Oral <User Schedule>  labetalol 50 milliGRAM(s) Oral every 12 hours  lamoTRIgine 100 milliGRAM(s) Oral daily  memantine 5 milliGRAM(s) Oral daily  memantine 10 milliGRAM(s) Oral at bedtime    PRN MEDICATIONS  acetaminophen   Tablet. 650 milliGRAM(s) Oral every 6 hours PRN    VITALS:         ICU Vital Signs Last 24 Hrs:    T(C): 36.7 (05 Jun 2018 04:00), Max: 37.2 (04 Jun 2018 20:00)  T(F): 98 (05 Jun 2018 04:00), Max: 98.9 (04 Jun 2018 20:00)  HR: 50 (05 Jun 2018 06:00) (50 - 76)  BP: 107/49 (05 Jun 2018 06:00) (74/52 - 140/55)  BP(mean): 67 (05 Jun 2018 06:00) (61 - 82)  ABP: --  ABP(mean): --  RR: 18 (05 Jun 2018 06:00) (18 - 31)  SpO2: 98% (05 Jun 2018 06:00) (90% - 98%)          I&O's Detail:      04 Jun 2018 07:01  -  05 Jun 2018 07:00  --------------------------------------------------------  IN:    Oral Fluid: 760 mL    Sodium Chloride 0.9% IV Bolus: 500 mL  Total IN: 1260 mL    OUT:  Total OUT: 0 mL    Total NET: 1260 mL          LABS:                        9.1    10.04 )-----------( 199      ( 05 Jun 2018 04:13 )             29.6     06-05    137  |  103  |  29<H>  ----------------------------<  91  4.7   |  20  |  1.5    Ca    7.9<L>      05 Jun 2018 04:13  Mg     1.9     06-04    TPro  5.1<L>  /  Alb  3.2<L>  /  TBili  0.5  /  DBili  x   /  AST  12  /  ALT  8   /  AlkPhos  66  06-04      CARDIAC MARKERS ( 04 Jun 2018 04:28 )  x     / 0.15 ng/mL / x     / x     / x          CAPILLARY BLOOD GLUCOSE            CULTURES:      PHYSICAL EXAM:  CONSTITUTIONAL: Well-developed; well-nourished; in no acute distress.   SKIN: warm, dry  HEAD: Normocephalic; atraumatic.  EYES: PERRL, EOMI, no conjunctival erythema  ENT: No nasal discharge; airway clear, mucous membranes moist  NECK: Supple; non tender.  CARD: +S1, S2 no murmurs, gallops, or rubs.   RESP: No wheezes, rales or rhonchi. CTABL  ABD: soft ntnd  EXT: moves all extremities  NEURO: Alert, oriented, grossly unremarkable  PSYCH: Cooperative, .     RADIOLOGY:   cxr 6/5 reviewed

## 2018-06-05 NOTE — PROGRESS NOTE ADULT - SUBJECTIVE AND OBJECTIVE BOX
seen/examined chart reviewed, clinically without change is out of bed, daughter at bedside, no sob, occ left sided chest pain with deep breath, sitting in chair, bp stable    no jvd  rhonchi   rrr  soft nt nd + bs  no e/c/c

## 2018-06-05 NOTE — PROGRESS NOTE ADULT - SUBJECTIVE AND OBJECTIVE BOX
Patient with hypertension, CKD, mild dementia, hx of type B aortic dissection in 2017 presented with chest pain and type A aortic dissection (primarily intimal hematoma)    SUBJ: the right sided chest pain has resolved but has back pain.      MEDICATIONS  (STANDING):  atorvastatin 80 milliGRAM(s) Oral at bedtime  deplin 15 milliGRAM(s) 15 milliGRAM(s) Oral daily  folic acid 1 milliGRAM(s) Oral daily  furosemide    Tablet 20 milliGRAM(s) Oral <User Schedule>  labetalol 50 milliGRAM(s) Oral every 12 hours  lamoTRIgine 100 milliGRAM(s) Oral daily  memantine 5 milliGRAM(s) Oral daily  memantine 10 milliGRAM(s) Oral at bedtime    MEDICATIONS  (PRN):  acetaminophen   Tablet. 650 milliGRAM(s) Oral every 6 hours PRN Headaches    Vital Signs Last 24 Hrs  T(C): 37.1 (05 Jun 2018 20:00), Max: 37.1 (05 Jun 2018 20:00)  T(F): 98.7 (05 Jun 2018 20:00), Max: 98.7 (05 Jun 2018 20:00)  HR: 57 (05 Jun 2018 22:00) (50 - 66)  BP: 121/57 (05 Jun 2018 22:00) (86/49 - 142/62)  BP(mean): 67 (05 Jun 2018 07:35) (62 - 82)  RR: 18 (05 Jun 2018 22:00) (18 - 34)  SpO2: 97% (05 Jun 2018 22:00) (94% - 99%)     REVIEW OF SYSTEMS:  CONSTITUTIONAL: No fever, chills  CARDIOLOGY: Denies shortness of breath or palpitations.   RESPIRATORY: denies cough, wheezing.   NEUROLOGICAL: No weakness, no focal deficits to report.  GI: no BRBPR, denies nausea, Vomiting or diarrhea.      PHYSICAL EXAM:  · CONSTITUTIONAL:	Well-developed, well nourished      ·RESPIRATORY:   breath sounds equal with good entry;  clear to auscultation bilaterally;   · CARDIOVASCULAR	S1 and S2 normal intensity, no rub, gallop.  2/6 NEO at base.   ABDOMEN: soft, non-tender, NABS  · EXTREMITIES: No cyanosis, clubbing.  1+ leg edema  NEURO: alert and oriented x 3, no focal deficits.  ·  TELEMETRY:  normal sinus rhythm    LABS:                        9.1    10.04 )-----------( 199      ( 05 Jun 2018 04:13 )             29.6     06-05    137  |  103  |  29<H>  ----------------------------<  91  4.7   |  20  |  1.5    Ca    7.9<L>      05 Jun 2018 04:13  Mg     1.9     06-04    TPro  5.1<L>  /  Alb  3.2<L>  /  TBili  0.5  /  DBili  x   /  AST  12  /  ALT  8   /  AlkPhos  66  06-04    CARDIAC MARKERS ( 04 Jun 2018 04:28 )  x     / 0.15 ng/mL / x     / x     / x              I&O's Summary    04 Jun 2018 07:01  -  05 Jun 2018 07:00  --------------------------------------------------------  IN: 1260 mL / OUT: 0 mL / NET: 1260 mL    05 Jun 2018 07:01  -  05 Jun 2018 22:32  --------------------------------------------------------  IN: 240 mL / OUT: 0 mL / NET: 240 mL      BNP  RADIOLOGY & ADDITIONAL STUDIES:    IMPRESSION AND PLAN:

## 2018-06-05 NOTE — PROGRESS NOTE ADULT - ASSESSMENT
84 y/o F with PMH of aortic aneurysm (diagnosed February 2017), DVT (diagnosed March, 2017) on eliquis, HLD, HTN, and dementia presented for acute onset of right chest pain, jaw pain, and R. arm paresthesias found to have worsening of aortic dissection    1) Ascending aortic dissection (type A)  -c/w labetalol, dose adjusted for hypotension  -f/u echo official report  -discussed with house staff  -further plan per cardio  - CT Sx following: no surgical intervention given patient overall condition, medical management only    2) Hx of deep vein thrombosis (march 2017)  - d/c Eliquis 2/2 aortic dissection    3) Hypertension  -, labetalol 50mg q12h    4) Disposition  -pt eval, oob, chair, ambulate  - home    5) Full code status

## 2018-06-05 NOTE — PROGRESS NOTE ADULT - ASSESSMENT
Type A aortic dissection  Hypertension  CKD  Anemia    REC:  Continue Labetalol 50 mg q12H and will titrate to control HR 50-70 range and -120 mm.  Discussed with family at bedside.  If stable may transfer to telemetry tomorrow.

## 2018-06-06 LAB
ANION GAP SERPL CALC-SCNC: 14 MMOL/L — SIGNIFICANT CHANGE UP (ref 7–14)
BUN SERPL-MCNC: 24 MG/DL — HIGH (ref 10–20)
CALCIUM SERPL-MCNC: 8.1 MG/DL — LOW (ref 8.5–10.1)
CHLORIDE SERPL-SCNC: 105 MMOL/L — SIGNIFICANT CHANGE UP (ref 98–110)
CO2 SERPL-SCNC: 20 MMOL/L — SIGNIFICANT CHANGE UP (ref 17–32)
CREAT SERPL-MCNC: 1.2 MG/DL — SIGNIFICANT CHANGE UP (ref 0.7–1.5)
GLUCOSE SERPL-MCNC: 92 MG/DL — SIGNIFICANT CHANGE UP (ref 70–99)
HCT VFR BLD CALC: 30.8 % — LOW (ref 37–47)
HGB BLD-MCNC: 9.5 G/DL — LOW (ref 12–16)
MCHC RBC-ENTMCNC: 28.4 PG — SIGNIFICANT CHANGE UP (ref 27–31)
MCHC RBC-ENTMCNC: 30.8 G/DL — LOW (ref 32–37)
MCV RBC AUTO: 91.9 FL — SIGNIFICANT CHANGE UP (ref 81–99)
NRBC # BLD: 0 /100 WBCS — SIGNIFICANT CHANGE UP (ref 0–0)
PLATELET # BLD AUTO: 217 K/UL — SIGNIFICANT CHANGE UP (ref 130–400)
POTASSIUM SERPL-MCNC: 4.6 MMOL/L — SIGNIFICANT CHANGE UP (ref 3.5–5)
POTASSIUM SERPL-SCNC: 4.6 MMOL/L — SIGNIFICANT CHANGE UP (ref 3.5–5)
RBC # BLD: 3.35 M/UL — LOW (ref 4.2–5.4)
RBC # FLD: 14.2 % — SIGNIFICANT CHANGE UP (ref 11.5–14.5)
SODIUM SERPL-SCNC: 139 MMOL/L — SIGNIFICANT CHANGE UP (ref 135–146)
WBC # BLD: 11.39 K/UL — HIGH (ref 4.8–10.8)
WBC # FLD AUTO: 11.39 K/UL — HIGH (ref 4.8–10.8)

## 2018-06-06 RX ORDER — ACETAMINOPHEN 500 MG
650 TABLET ORAL ONCE
Qty: 0 | Refills: 0 | Status: COMPLETED | OUTPATIENT
Start: 2018-06-06 | End: 2018-06-06

## 2018-06-06 RX ORDER — LABETALOL HCL 100 MG
50 TABLET ORAL THREE TIMES A DAY
Qty: 0 | Refills: 0 | Status: DISCONTINUED | OUTPATIENT
Start: 2018-06-06 | End: 2018-06-09

## 2018-06-06 RX ORDER — HYDRALAZINE HCL 50 MG
25 TABLET ORAL ONCE
Qty: 0 | Refills: 0 | Status: DISCONTINUED | OUTPATIENT
Start: 2018-06-06 | End: 2018-06-06

## 2018-06-06 RX ADMIN — Medication 50 MILLIGRAM(S): at 05:04

## 2018-06-06 RX ADMIN — MEMANTINE HYDROCHLORIDE 10 MILLIGRAM(S): 10 TABLET ORAL at 21:50

## 2018-06-06 RX ADMIN — Medication 1 MILLIGRAM(S): at 11:35

## 2018-06-06 RX ADMIN — Medication 650 MILLIGRAM(S): at 20:32

## 2018-06-06 RX ADMIN — ATORVASTATIN CALCIUM 80 MILLIGRAM(S): 80 TABLET, FILM COATED ORAL at 21:50

## 2018-06-06 RX ADMIN — Medication 50 MILLIGRAM(S): at 13:45

## 2018-06-06 RX ADMIN — MEMANTINE HYDROCHLORIDE 5 MILLIGRAM(S): 10 TABLET ORAL at 11:35

## 2018-06-06 RX ADMIN — LAMOTRIGINE 100 MILLIGRAM(S): 25 TABLET, ORALLY DISINTEGRATING ORAL at 11:39

## 2018-06-06 NOTE — PROGRESS NOTE ADULT - SUBJECTIVE AND OBJECTIVE BOX
SUBJECTIVE:    Patient is a 85y old Female who presents with a chief complaint of R. Chest pain, jaw pain, and R. arm paresthesias (01 Jun 2018 15:15)  No events overnight.      PAST MEDICAL & SURGICAL HISTORY  Dyslipidemia  Dementia  Thoracic aortic dissection  History of deep vein thrombosis  Hypertension, unspecified type  Status post hip surgery  H/O rotator cuff tear  H/O partial thyroidectomy    ALLERGIES:  NSAIDs (Hives; Urticaria)    MEDICATIONS:  STANDING MEDICATIONS  atorvastatin 80 milliGRAM(s) Oral at bedtime  deplin 15 milliGRAM(s) 15 milliGRAM(s) Oral daily  folic acid 1 milliGRAM(s) Oral daily  furosemide    Tablet 20 milliGRAM(s) Oral <User Schedule>  labetalol 50 milliGRAM(s) Oral three times a day  lamoTRIgine 100 milliGRAM(s) Oral daily  memantine 5 milliGRAM(s) Oral daily  memantine 10 milliGRAM(s) Oral at bedtime    PRN MEDICATIONS  acetaminophen   Tablet. 650 milliGRAM(s) Oral every 6 hours PRN    VITALS:   T(F): 98  HR: 72  BP: 149/61  RR: 98  SpO2: 99%    LABS:                        9.5    11.39 )-----------( 217      ( 06 Jun 2018 05:37 )             30.8     06-06    139  |  105  |  24<H>  ----------------------------<  92  4.6   |  20  |  1.2    Ca    8.1<L>      06 Jun 2018 05:37                        06-05-18 @ 07:01  -  06-06-18 @ 07:00  --------------------------------------------------------  IN: 340 mL / OUT: 0 mL / NET: 340 mL    06-06-18 @ 07:01  - 06-06-18 @ 13:55  --------------------------------------------------------  IN: 780 mL / OUT: 0 mL / NET: 780 mL    PHYSICAL EXAM:  GEN: NAD, comfortable  LUNGS: CTAB, no w/r/r  HEART: RRR, no m/r/g  ABD: soft, NT/ND, +BS  EXT: no edema, PP b/l  NEURO: AAOX3    Radiology  < from: Transthoracic Echocardiogram (06.01.18 @ 17:35) >  Summary:   1. Spectral Doppler shows impaired relaxation pattern of left   ventricular myocardial filling (Grade I diastolic dysfunction).   2. Sclerotic aortic valve with normal opening.   3. Dilatation of the aortic root.   4. AORTIC ROOT DILATED TO 3.9CM AND ASCENDING AORTA TO 4.9CM AND   POSSIBLE DISECTION FLAP NOTED.    < end of copied text >

## 2018-06-06 NOTE — PROGRESS NOTE ADULT - SUBJECTIVE AND OBJECTIVE BOX
86 y/o F with PMH of aortic aneurysm (diagnosed February 2017), DVT (diagnosed March, 2017) on eliquis, HTN, HLD, and dementia presented for acute onset of right chest pain, paresthesias.  CT Cardio /Vascular surgery was consultedCTA was positive for ascending thoracic aortic dissection and aneurysm with intramural hematoma significantly worsened from the prior study of February 2017.  As per ED, discussion between CT surgery team and family ended in opting out of a surgical intervention so she was admitted to the Coronary Care Unit for medical management.  Currently her BP has been labile 120-17- systolic on Labetolol bid

## 2018-06-06 NOTE — PROGRESS NOTE ADULT - SUBJECTIVE AND OBJECTIVE BOX
SUBJ:   no further back pain.  No chest pain.    MEDICATIONS  (STANDING):  atorvastatin 80 milliGRAM(s) Oral at bedtime  deplin 15 milliGRAM(s) 15 milliGRAM(s) Oral daily  folic acid 1 milliGRAM(s) Oral daily  furosemide    Tablet 20 milliGRAM(s) Oral <User Schedule>  labetalol 50 milliGRAM(s) Oral every 12 hours  lamoTRIgine 100 milliGRAM(s) Oral daily  memantine 5 milliGRAM(s) Oral daily  memantine 10 milliGRAM(s) Oral at bedtime    MEDICATIONS  (PRN):  acetaminophen   Tablet. 650 milliGRAM(s) Oral every 6 hours PRN Headaches      Vital Signs Last 24 Hrs  T(C): 36.7 (06 Jun 2018 06:00), Max: 37.1 (05 Jun 2018 20:00)  T(F): 98 (06 Jun 2018 06:00), Max: 98.7 (05 Jun 2018 20:00)  HR: 74 (06 Jun 2018 09:00) (54 - 74)  BP: 99/71 (06 Jun 2018 09:00) (86/49 - 173/71)  BP(mean): 108 (06 Jun 2018 06:00) (82 - 108)  RR: 199 (06 Jun 2018 06:00) (18 - 219)  SpO2: 99% (06 Jun 2018 04:00) (95% - 99%)     REVIEW OF SYSTEMS:  CONSTITUTIONAL: No fever, chills.  CARDIOLOGY: Denies chest pain.   RESPIRATORY: denies cough, wheezing.   : urinary incontinence.    PHYSICAL EXAM:  · CONSTITUTIONAL:	Well-developed, elderly female in no distress.  ·RESPIRATORY:   breath sounds diminished in left base with few rales.  · CARDIOVASCULAR	S1 and S2 normal intensity, murmur unchanged    ABDOMEN: soft, non-tender, NABS  · EXTREMITIES: No cyanosis, clubbing or edema  NEURO: alert and oriented x 3, no focal deficits.  ·  	  TELEMETRY:  NSR    LABS:                        9.5    11.39 )-----------( 217      ( 06 Jun 2018 05:37 )             30.8     06-06    139  |  105  |  24<H>  ----------------------------<  92  4.6   |  20  |  1.2    Ca    8.1<L>      06 Jun 2018 05:37              I&O's Summary    05 Jun 2018 07:01  -  06 Jun 2018 07:00  --------------------------------------------------------  IN: 340 mL / OUT: 0 mL / NET: 340 mL      BNP  RADIOLOGY & ADDITIONAL STUDIES:    IMPRESSION AND PLAN:

## 2018-06-06 NOTE — PROGRESS NOTE ADULT - ASSESSMENT
Thoracic aneurysm  VTE  MUGS  HTN  Early Dementia      Plan to increase the labetolol to tid, please obtain orthostatic BP.  PMR eval for fall precaution  Eliquis was dc given clinical circumstances. Stable for downgrade.

## 2018-06-06 NOTE — PROGRESS NOTE ADULT - ASSESSMENT
Type A aortic dissection- remains hemodynamically stable. Deemed very high risk for surgical repair.    Hypertension still labile at times.  Systolic BP was in 150s this am  CKD  Anemia    REC:  Increase  Labetalol to 50 mg q8H and will titrate to control HR 50-70 range and -120 mm.  Discussed with house-staff.  discussed with family at bedside and with Camelia via phone.

## 2018-06-06 NOTE — PROGRESS NOTE ADULT - ASSESSMENT
86 y/o F with PMH of aortic aneurysm (diagnosed February 2017), DVT (diagnosed March, 2017) on eliquis, HLD, HTN, and dementia presented for acute onset of right chest pain, jaw pain, and R. arm paresthesias found to have worsening of aortic dissection    1) Ascending aortic dissection (type A)  -c/w labetalol, Increased Labetalol 50 mg q8h  -f/u echo repo  - CT Sx following: no surgical intervention given patient overall condition, medical management only    2) Hx of deep vein thrombosis  - hold Eliquis 2/2 aortic dissection    3) Hypertension  -per cardio recs, labetalol 50mg q12h    #) Dementia  - c/w Namenda    #) Dyslipidemia  - c/w Lipitor 80mg    #) Disposition  - from home    #) Full code status  - Downgrade to telemetry

## 2018-06-07 LAB
ALBUMIN SERPL ELPH-MCNC: 3.2 G/DL — LOW (ref 3.5–5.2)
ALP SERPL-CCNC: 107 U/L — SIGNIFICANT CHANGE UP (ref 30–115)
ALT FLD-CCNC: 15 U/L — SIGNIFICANT CHANGE UP (ref 0–41)
ANION GAP SERPL CALC-SCNC: 15 MMOL/L — HIGH (ref 7–14)
AST SERPL-CCNC: 13 U/L — SIGNIFICANT CHANGE UP (ref 0–41)
BILIRUB SERPL-MCNC: 0.8 MG/DL — SIGNIFICANT CHANGE UP (ref 0.2–1.2)
BUN SERPL-MCNC: 20 MG/DL — SIGNIFICANT CHANGE UP (ref 10–20)
CALCIUM SERPL-MCNC: 8.1 MG/DL — LOW (ref 8.5–10.1)
CHLORIDE SERPL-SCNC: 104 MMOL/L — SIGNIFICANT CHANGE UP (ref 98–110)
CO2 SERPL-SCNC: 20 MMOL/L — SIGNIFICANT CHANGE UP (ref 17–32)
CREAT SERPL-MCNC: 1.1 MG/DL — SIGNIFICANT CHANGE UP (ref 0.7–1.5)
GLUCOSE SERPL-MCNC: 106 MG/DL — HIGH (ref 70–99)
HCT VFR BLD CALC: 28.1 % — LOW (ref 37–47)
HGB BLD-MCNC: 8.6 G/DL — LOW (ref 12–16)
MCHC RBC-ENTMCNC: 27.8 PG — SIGNIFICANT CHANGE UP (ref 27–31)
MCHC RBC-ENTMCNC: 30.6 G/DL — LOW (ref 32–37)
MCV RBC AUTO: 90.9 FL — SIGNIFICANT CHANGE UP (ref 81–99)
NRBC # BLD: 0 /100 WBCS — SIGNIFICANT CHANGE UP (ref 0–0)
PLATELET # BLD AUTO: 230 K/UL — SIGNIFICANT CHANGE UP (ref 130–400)
POTASSIUM SERPL-MCNC: 4.5 MMOL/L — SIGNIFICANT CHANGE UP (ref 3.5–5)
POTASSIUM SERPL-SCNC: 4.5 MMOL/L — SIGNIFICANT CHANGE UP (ref 3.5–5)
PROT SERPL-MCNC: 5.6 G/DL — LOW (ref 6–8)
RBC # BLD: 3.09 M/UL — LOW (ref 4.2–5.4)
RBC # FLD: 14.1 % — SIGNIFICANT CHANGE UP (ref 11.5–14.5)
SODIUM SERPL-SCNC: 139 MMOL/L — SIGNIFICANT CHANGE UP (ref 135–146)
WBC # BLD: 9.39 K/UL — SIGNIFICANT CHANGE UP (ref 4.8–10.8)
WBC # FLD AUTO: 9.39 K/UL — SIGNIFICANT CHANGE UP (ref 4.8–10.8)

## 2018-06-07 RX ORDER — ACETAMINOPHEN 500 MG
650 TABLET ORAL EVERY 6 HOURS
Qty: 0 | Refills: 0 | Status: DISCONTINUED | OUTPATIENT
Start: 2018-06-07 | End: 2018-06-22

## 2018-06-07 RX ADMIN — ATORVASTATIN CALCIUM 80 MILLIGRAM(S): 80 TABLET, FILM COATED ORAL at 22:05

## 2018-06-07 RX ADMIN — Medication 50 MILLIGRAM(S): at 13:22

## 2018-06-07 RX ADMIN — Medication 20 MILLIGRAM(S): at 11:31

## 2018-06-07 RX ADMIN — Medication 50 MILLIGRAM(S): at 05:49

## 2018-06-07 RX ADMIN — Medication 650 MILLIGRAM(S): at 23:35

## 2018-06-07 RX ADMIN — LAMOTRIGINE 100 MILLIGRAM(S): 25 TABLET, ORALLY DISINTEGRATING ORAL at 11:32

## 2018-06-07 RX ADMIN — Medication 650 MILLIGRAM(S): at 04:00

## 2018-06-07 RX ADMIN — Medication 650 MILLIGRAM(S): at 16:40

## 2018-06-07 RX ADMIN — MEMANTINE HYDROCHLORIDE 10 MILLIGRAM(S): 10 TABLET ORAL at 22:06

## 2018-06-07 RX ADMIN — MEMANTINE HYDROCHLORIDE 5 MILLIGRAM(S): 10 TABLET ORAL at 11:31

## 2018-06-07 RX ADMIN — Medication 650 MILLIGRAM(S): at 03:29

## 2018-06-07 RX ADMIN — Medication 1 MILLIGRAM(S): at 11:31

## 2018-06-07 RX ADMIN — Medication 650 MILLIGRAM(S): at 16:17

## 2018-06-07 RX ADMIN — Medication 50 MILLIGRAM(S): at 22:05

## 2018-06-07 NOTE — PROGRESS NOTE ADULT - ASSESSMENT
Assessment and Plan:   		  84 y/o F with PMH of aortic aneurysm (diagnosed February 2017), DVT 1) Ascending aortic dissection (type A)  -c/w labetalol, dose adjusted for hypotension    - CT Sx following: no surgical intervention given patient overall condition, medical management only    2) Hypertension  -per cardio recs, labetalol  -f/u ortho static bp, Cardio fu    3) Dementia  - c/w Namenda    4) Dyslipidemia  - c/w Lipitor high dose algorythm    Disposition Home dcd w PT

## 2018-06-07 NOTE — PROGRESS NOTE ADULT - ASSESSMENT
86 y/o F with PMH of aortic aneurysm (diagnosed February 2017), DVT (diagnosed March, 2017) on eliquis, HLD, HTN, and dementia presented for acute onset of right chest pain, jaw pain, and R. arm paresthesias found to have worsening of aortic dissection    1) Ascending aortic dissection (type A)  -c/w labetalol, dose adjusted for hypotension  -Transthoracic Echocardiogram (06.01.18 @ 17:35): 1. Spectral Doppler shows impaired relaxation pattern of left ventricular myocardial filling (Grade I diastolic dysfunction).  2. Sclerotic aortic valve with normal opening. 3. Dilatation of the aortic root. 4. AORTIC ROOT DILATED TO 3.9CM AND ASCENDING AORTA TO 4.9CM AND POSSIBLE DISECTION FLAP NOTED.  - CT Sx following: no surgical intervention given patient overall condition, medical management only    2) Hx of deep vein thrombosis  - hold Eliquis 2/2 aortic dissection    3) Hypertension  -per cardio recs, labetalol  -f/u ortho static bp    4) Dementia  - c/w Namenda    5) Dyslipidemia  - c/w Lipitor     Disposition: downgrade to tele  Full code status

## 2018-06-07 NOTE — PROGRESS NOTE ADULT - SUBJECTIVE AND OBJECTIVE BOX
· Subjective and Objective: 	  Patient is a 85y old Female who presents with a chief complaint of R. Chest pain, jaw pain, and R. arm paresthesias (01 Jun 2018 15:15)            BRIEF HOSPITAL COURSE:   86 y/o F with PMH of aortic aneurysm (diagnosed February 2017), DVT (diagnosed March, 2017) on eliquis, HTN, HLD, and dementia presented for acute onset of right chest pain, jaw pain, and R. arm paresthesias. She states she woke up around 6am the day of presentation with the pain. It feels like a tightness in her chest and a numbness/tingling sensation traveling from her right shoulder down the entire arm to her fingers. Symptoms persisted since onset and are still present at the time of admission. Vascular surgery was consulted in the ED, who recommended a CTA to look for aortic dissection. The test came back positive for ascending thoracic aortic dissection and aneurysm with intramural hematoma significantly worsened from the prior study of February 2017.  As per ED, discussion between CT surgery team and family ended in opting out of a surgical intervention so she was admitted to the Coronary Care Unit for medical management.      STANDING MEDICATIONS  atorvastatin 80 milliGRAM(s) Oral at bedtime  deplin 15 milliGRAM(s) 15 milliGRAM(s) Oral daily  folic acid 1 milliGRAM(s) Oral daily  furosemide    Tablet 20 milliGRAM(s) Oral <User Schedule>  labetalol 50 milliGRAM(s) Oral three times a day  lamoTRIgine 100 milliGRAM(s) Oral daily  memantine 5 milliGRAM(s) Oral daily  memantine 10 milliGRAM(s) Oral at bedtime          ICU Vital Signs Last 24 Hrs:    T(C): 36.6 (07 Jun 2018 04:00), Max: 37.6 (06 Jun 2018 20:00)  T(F): 97.9 (07 Jun 2018 04:00), Max: 99.6 (06 Jun 2018 20:00)  HR: 50 (07 Jun 2018 06:20) (50 - 74)  BP: 141/63 (07 Jun 2018 06:20) (99/71 - 166/80)  BP(mean): 93 (07 Jun 2018 06:20) (75 - 114)  ABP: --  ABP(mean): --  RR: 23 (07 Jun 2018 06:20) (19 - 37)  SpO2: 99% (07 Jun 2018 06:20) (93% - 100%)            LABS:                        8.6    9.39  )-----------( 230      ( 07 Jun 2018 04:16 )             28.1     06-07    139  |  104  |  20  ----------------------------<  106<H>  4.5   |  20  |  1.1    Ca    8.1<L>      07 Jun 2018 04:16    TPro  5.6<L>  /  Alb  3.2<L>  /  TBili  0.8  /  DBili  x   /  AST  13  /  ALT  15  /  AlkPhos  107  06-07          CAPILLARY BLOOD GLUCOSE            CULTURES:      PHYSICAL EXAM:  CONSTITUTIONAL: Well-developed; well-nourished; in no acute distress.   SKIN: warm, dry  CARD: +S1, S2 Regular rate and rhythm.   RESP: decreased breath sounds at bases  ABD: soft ntnd  NEURO: Alert, oriented, grossly unremarkable

## 2018-06-07 NOTE — PROGRESS NOTE ADULT - SUBJECTIVE AND OBJECTIVE BOX
Patient is a 85y old Female who presents with a chief complaint of R. Chest pain, jaw pain, and R. arm paresthesias (01 Jun 2018 15:15)    Currently admitted to medicine with the primary diagnosis of Chest pain    Today is hospital day 6d.    BRIEF HOSPITAL COURSE:   84 y/o F with PMH of aortic aneurysm (diagnosed February 2017), DVT (diagnosed March, 2017) on eliquis, HTN, HLD, and dementia presented for acute onset of right chest pain, jaw pain, and R. arm paresthesias. She states she woke up around 6am the day of presentation with the pain. It feels like a tightness in her chest and a numbness/tingling sensation traveling from her right shoulder down the entire arm to her fingers. Symptoms persisted since onset and are still present at the time of admission. Vascular surgery was consulted in the ED, who recommended a CTA to look for aortic dissection. The test came back positive for ascending thoracic aortic dissection and aneurysm with intramural hematoma significantly worsened from the prior study of February 2017.  As per ED, discussion between CT surgery team and family ended in opting out of a surgical intervention so she was admitted to the Coronary Care Unit for medical management.    EVENTS LAST 24HRS:   no overnight events    PAST MEDICAL & SURGICAL HISTORY  Dyslipidemia  Dementia  Thoracic aortic dissection  History of deep vein thrombosis  Hypertension, unspecified type  Status post hip surgery  H/O rotator cuff tear  H/O partial thyroidectomy      SOCIAL HISTORY:      REVIEW OF SYSTEMS:  See HPI    ALLERGIES:  NSAIDs (Hives; Urticaria)    MEDICATIONS:  STANDING MEDICATIONS  atorvastatin 80 milliGRAM(s) Oral at bedtime  deplin 15 milliGRAM(s) 15 milliGRAM(s) Oral daily  folic acid 1 milliGRAM(s) Oral daily  furosemide    Tablet 20 milliGRAM(s) Oral <User Schedule>  labetalol 50 milliGRAM(s) Oral three times a day  lamoTRIgine 100 milliGRAM(s) Oral daily  memantine 5 milliGRAM(s) Oral daily  memantine 10 milliGRAM(s) Oral at bedtime    PRN MEDICATIONS  acetaminophen   Tablet. 650 milliGRAM(s) Oral every 6 hours PRN    VITALS:         ICU Vital Signs Last 24 Hrs:    T(C): 36.6 (07 Jun 2018 04:00), Max: 37.6 (06 Jun 2018 20:00)  T(F): 97.9 (07 Jun 2018 04:00), Max: 99.6 (06 Jun 2018 20:00)  HR: 50 (07 Jun 2018 06:20) (50 - 74)  BP: 141/63 (07 Jun 2018 06:20) (99/71 - 166/80)  BP(mean): 93 (07 Jun 2018 06:20) (75 - 114)  ABP: --  ABP(mean): --  RR: 23 (07 Jun 2018 06:20) (19 - 37)  SpO2: 99% (07 Jun 2018 06:20) (93% - 100%)          I&O's Detail:      06 Jun 2018 07:01  -  07 Jun 2018 07:00  --------------------------------------------------------  IN:    Oral Fluid: 1260 mL  Total IN: 1260 mL    OUT:    Voided: 1 mL  Total OUT: 1 mL    Total NET: 1259 mL          LABS:                        8.6    9.39  )-----------( 230      ( 07 Jun 2018 04:16 )             28.1     06-07    139  |  104  |  20  ----------------------------<  106<H>  4.5   |  20  |  1.1    Ca    8.1<L>      07 Jun 2018 04:16    TPro  5.6<L>  /  Alb  3.2<L>  /  TBili  0.8  /  DBili  x   /  AST  13  /  ALT  15  /  AlkPhos  107  06-07          CAPILLARY BLOOD GLUCOSE            CULTURES:      PHYSICAL EXAM:  CONSTITUTIONAL: Well-developed; well-nourished; in no acute distress.   SKIN: warm, dry  HEAD: Normocephalic; atraumatic.  EYES: PERRL, EOMI, no conjunctival erythema  ENT: No nasal discharge; airway clear, mucous membranes moist  NECK: Supple; non tender.  CARD: +S1, S2 Regular rate and rhythm.   RESP: decreased breath sounds at bases  ABD: soft ntnd  EXT: moves all extremities  NEURO: Alert, oriented, grossly unremarkable  PSYCH: Cooperative, appropriate.

## 2018-06-08 ENCOUNTER — TRANSCRIPTION ENCOUNTER (OUTPATIENT)
Age: 83
End: 2018-06-08

## 2018-06-08 LAB
ANION GAP SERPL CALC-SCNC: 15 MMOL/L — HIGH (ref 7–14)
BUN SERPL-MCNC: 21 MG/DL — HIGH (ref 10–20)
CALCIUM SERPL-MCNC: 8.1 MG/DL — LOW (ref 8.5–10.1)
CHLORIDE SERPL-SCNC: 102 MMOL/L — SIGNIFICANT CHANGE UP (ref 98–110)
CO2 SERPL-SCNC: 20 MMOL/L — SIGNIFICANT CHANGE UP (ref 17–32)
CREAT SERPL-MCNC: 1.2 MG/DL — SIGNIFICANT CHANGE UP (ref 0.7–1.5)
GLUCOSE SERPL-MCNC: 104 MG/DL — HIGH (ref 70–99)
HCT VFR BLD CALC: 26.4 % — LOW (ref 37–47)
HGB BLD-MCNC: 8.3 G/DL — LOW (ref 12–16)
MCHC RBC-ENTMCNC: 28.3 PG — SIGNIFICANT CHANGE UP (ref 27–31)
MCHC RBC-ENTMCNC: 31.4 G/DL — LOW (ref 32–37)
MCV RBC AUTO: 90.1 FL — SIGNIFICANT CHANGE UP (ref 81–99)
NRBC # BLD: 0 /100 WBCS — SIGNIFICANT CHANGE UP (ref 0–0)
PLATELET # BLD AUTO: 223 K/UL — SIGNIFICANT CHANGE UP (ref 130–400)
POTASSIUM SERPL-MCNC: 4.3 MMOL/L — SIGNIFICANT CHANGE UP (ref 3.5–5)
POTASSIUM SERPL-SCNC: 4.3 MMOL/L — SIGNIFICANT CHANGE UP (ref 3.5–5)
RBC # BLD: 2.93 M/UL — LOW (ref 4.2–5.4)
RBC # FLD: 14.2 % — SIGNIFICANT CHANGE UP (ref 11.5–14.5)
SODIUM SERPL-SCNC: 137 MMOL/L — SIGNIFICANT CHANGE UP (ref 135–146)
WBC # BLD: 9.43 K/UL — SIGNIFICANT CHANGE UP (ref 4.8–10.8)
WBC # FLD AUTO: 9.43 K/UL — SIGNIFICANT CHANGE UP (ref 4.8–10.8)

## 2018-06-08 RX ADMIN — Medication 50 MILLIGRAM(S): at 06:08

## 2018-06-08 RX ADMIN — Medication 650 MILLIGRAM(S): at 18:01

## 2018-06-08 RX ADMIN — MEMANTINE HYDROCHLORIDE 10 MILLIGRAM(S): 10 TABLET ORAL at 21:24

## 2018-06-08 RX ADMIN — Medication 1 MILLIGRAM(S): at 12:04

## 2018-06-08 RX ADMIN — Medication 650 MILLIGRAM(S): at 18:02

## 2018-06-08 RX ADMIN — MEMANTINE HYDROCHLORIDE 5 MILLIGRAM(S): 10 TABLET ORAL at 12:04

## 2018-06-08 RX ADMIN — Medication 650 MILLIGRAM(S): at 00:00

## 2018-06-08 RX ADMIN — ATORVASTATIN CALCIUM 80 MILLIGRAM(S): 80 TABLET, FILM COATED ORAL at 21:24

## 2018-06-08 RX ADMIN — LAMOTRIGINE 100 MILLIGRAM(S): 25 TABLET, ORALLY DISINTEGRATING ORAL at 12:04

## 2018-06-08 RX ADMIN — Medication 50 MILLIGRAM(S): at 21:24

## 2018-06-08 RX ADMIN — Medication 50 MILLIGRAM(S): at 14:19

## 2018-06-08 NOTE — DISCHARGE NOTE ADULT - MEDICATION SUMMARY - MEDICATIONS TO CHANGE
I will SWITCH the dose or number of times a day I take the medications listed below when I get home from the hospital:    Lasix 20 mg oral tablet  -- 1 tab(s) by mouth 2 times a week (Mon / Thurs)

## 2018-06-08 NOTE — DIETITIAN INITIAL EVALUATION ADULT. - ORAL INTAKE PTA
good/PTA pt eats very well, likes chips, special K cereal, and sweets. However, she tries to limit them. Takes no vitamin. NKFA.

## 2018-06-08 NOTE — DISCHARGE NOTE ADULT - MEDICATION SUMMARY - MEDICATIONS TO TAKE
I will START or STAY ON the medications listed below when I get home from the hospital:    lisinopril 10 mg oral tablet  -- 1 tab(s) by mouth every 12 hours  -- Indication: For HTN    Eliquis 2.5 mg oral tablet  -- 1 tab(s) by mouth 2 times a day  -- Indication: For DVT    lamoTRIgine 100 mg oral tablet, extended release  -- 1 tab(s) by mouth once a day  -- Indication: For Anticonvulsant    labetalol 100 mg oral tablet  -- 0.5 tab(s) by mouth every 6 hours   -- It is very important that you take or use this exactly as directed.  Do not skip doses or discontinue unless directed by your doctor.  May cause drowsiness.  Alcohol may intensify this effect.  Use care when operating dangerous machinery.  Some non-prescription drugs may aggravate your condition.  Read all labels carefully.  If a warning appears, check with your doctor before taking.    -- Indication: For Ascending aortic dissection    furosemide 20 mg oral tablet  -- 1 tab(s) by mouth once a day  -- Indication: For ChF    Namenda 5 mg oral tablet  -- 1 tab(s) by mouth once a day (in the morning)  -- Indication: For Dementia    Namenda 10 mg oral tablet  -- 1 tab(s) by mouth once a day (in the evening)  -- Indication: For Dementia    Deplin 15 mg oral tablet  -- 1 tab(s) by mouth once a day  -- Indication: For Supplement    folic acid 1 mg oral tablet  -- 1 tab(s) by mouth once a day  -- Indication: For Supplement I will START or STAY ON the medications listed below when I get home from the hospital:    lisinopril 10 mg oral tablet  -- 1 tab(s) by mouth every 12 hours  -- Indication: For HTN    sodium bicarbonate 650 mg oral tablet  -- 1 tab(s) by mouth 3 times a day  -- Indication: For Anion gap metabolic acidosis    Eliquis 2.5 mg oral tablet  -- 1 tab(s) by mouth 2 times a day  -- Indication: For DVT    lamoTRIgine 100 mg oral tablet, extended release  -- 1 tab(s) by mouth once a day  -- Indication: For Anticonvulsant    labetalol 100 mg oral tablet  -- 0.5 tab(s) by mouth every 6 hours   -- It is very important that you take or use this exactly as directed.  Do not skip doses or discontinue unless directed by your doctor.  May cause drowsiness.  Alcohol may intensify this effect.  Use care when operating dangerous machinery.  Some non-prescription drugs may aggravate your condition.  Read all labels carefully.  If a warning appears, check with your doctor before taking.    -- Indication: For Ascending aortic dissection    furosemide 20 mg oral tablet  -- 1 tab(s) by mouth once a day  -- Indication: For ChF    Namenda 5 mg oral tablet  -- 1 tab(s) by mouth once a day (in the morning)  -- Indication: For Dementia    Namenda 10 mg oral tablet  -- 1 tab(s) by mouth once a day (in the evening)  -- Indication: For Dementia    Deplin 15 mg oral tablet  -- 1 tab(s) by mouth once a day  -- Indication: For Supplement    folic acid 1 mg oral tablet  -- 1 tab(s) by mouth once a day  -- Indication: For Supplement I will START or STAY ON the medications listed below when I get home from the hospital:    sodium bicarbonate 650 mg oral tablet  -- 1 tab(s) by mouth 3 times a day  -- Indication: For Anion gap metabolic acidosis    lamoTRIgine 100 mg oral tablet, extended release  -- 1 tab(s) by mouth once a day  -- Indication: For Anticonvulsant    labetalol 100 mg oral tablet  -- 0.5 tab(s) by mouth every 6 hours   -- It is very important that you take or use this exactly as directed.  Do not skip doses or discontinue unless directed by your doctor.  May cause drowsiness.  Alcohol may intensify this effect.  Use care when operating dangerous machinery.  Some non-prescription drugs may aggravate your condition.  Read all labels carefully.  If a warning appears, check with your doctor before taking.    -- Indication: For Ascending aortic dissection    furosemide 20 mg oral tablet  -- 1 tab(s) by mouth once a day  -- Indication: For ChF    Namenda 5 mg oral tablet  -- 1 tab(s) by mouth once a day (in the morning)  -- Indication: For Dementia    Namenda 10 mg oral tablet  -- 1 tab(s) by mouth once a day (in the evening)  -- Indication: For Dementia    Deplin 15 mg oral tablet  -- 1 tab(s) by mouth once a day  -- Indication: For Supplement    folic acid 1 mg oral tablet  -- 1 tab(s) by mouth once a day  -- Indication: For Supplement

## 2018-06-08 NOTE — PHYSICAL THERAPY INITIAL EVALUATION ADULT - PERTINENT HX OF CURRENT PROBLEM, REHAB EVAL
Patient is a 85y old Female who presented to ED with a chief complaint of Right Chest pain, jaw pain, and Right arm paresthesias. Testing showed ascending thoracic aortic dissection and aneurysm with intramural hematoma significantly worsened from the prior study of February 2017.  As per ED, discussion between CT surgery team and family ended in opting out of a surgical intervention so she was admitted to the Coronary Care Unit for medical management.

## 2018-06-08 NOTE — DIETITIAN INITIAL EVALUATION ADULT. - SOURCE
p/w c/o R chest pain, jaw pain, R arm paresthesia. CT sx following. HTN per cardio recs. Dementia and DLD- on meds. Daughter and granddaughter at bedside./family/significant other/other (specify)/patient

## 2018-06-08 NOTE — PROGRESS NOTE ADULT - ASSESSMENT
84 y/o F with PMH of aortic aneurysm (diagnosed February 2017), DVT (diagnosed March, 2017) on eliquis, HLD, HTN, and dementia presented for acute onset of right chest pain, jaw pain, and R. arm paresthesias found to have worsening of aortic dissection    1) Ascending aortic dissection (type A)  -c/w labetalol, dose adjusted for hypotension  -Transthoracic Echocardiogram (06.01.18 @ 17:35): 1. Spectral Doppler shows impaired relaxation pattern of left ventricular myocardial filling (Grade I diastolic dysfunction).  2. Sclerotic aortic valve with normal opening. 3. Dilatation of the aortic root. 4. AORTIC ROOT DILATED TO 3.9CM AND ASCENDING AORTA TO 4.9CM AND POSSIBLE DISECTION FLAP NOTED.  - CT Sx following: no surgical intervention given patient overall condition, medical management only    2) Hx of deep vein thrombosis  - hold Eliquis 2/2 aortic dissection    3) Hypertension  -per cardio recs, labetalol    4) Dementia  - c/w Namenda    5) Dyslipidemia  - c/w Lipitor    Disposition: spoke to dr norris regarding dispo, pt to be downgraded to regular medicine floor  Full code status 86 y/o F with PMH of aortic aneurysm (diagnosed February 2017), DVT (diagnosed March, 2017) on eliquis, HLD, HTN, and dementia presented for acute onset of right chest pain, jaw pain, and R. arm paresthesias found to have worsening of aortic dissection    1) Ascending aortic dissection (type A)  -c/w labetalol, dose adjusted for hypotension  -Transthoracic Echocardiogram (06.01.18 @ 17:35): 1. Spectral Doppler shows impaired relaxation pattern of left ventricular myocardial filling (Grade I diastolic dysfunction).  2. Sclerotic aortic valve with normal opening. 3. Dilatation of the aortic root. 4. AORTIC ROOT DILATED TO 3.9CM AND ASCENDING AORTA TO 4.9CM AND POSSIBLE DISECTION FLAP NOTED.  - CT Sx following: no surgical intervention given patient overall condition, medical management only    2) Hx of deep vein thrombosis  - hold Eliquis 2/2 aortic dissection    3) Hypertension  -per cardio recs, labetalol    4) Dementia  - c/w Namenda    5) Dyslipidemia  - c/w Lipitor    -f/u PT eval  Disposition: spoke to dr norris regarding dispo, pt to be downgraded to regular medicine floor  Full code status 84 y/o F with PMH of aortic aneurysm (diagnosed February 2017), DVT (diagnosed March, 2017) on eliquis, HLD, HTN, and dementia presented for acute onset of right chest pain, jaw pain, and R. arm paresthesias found to have worsening of aortic dissection    1) Ascending aortic dissection (type A)  -c/w labetalol, dose adjusted for hypotension  -Transthoracic Echocardiogram (06.01.18 @ 17:35): 1. Spectral Doppler shows impaired relaxation pattern of left ventricular myocardial filling (Grade I diastolic dysfunction).  2. Sclerotic aortic valve with normal opening. 3. Dilatation of the aortic root. 4. AORTIC ROOT DILATED TO 3.9CM AND ASCENDING AORTA TO 4.9CM AND POSSIBLE DISECTION FLAP NOTED.  - CT Sx following: no surgical intervention given patient overall condition, medical management only  -as per echo, grade 1 diastolic dysfunction    2) Hx of deep vein thrombosis  - hold Eliquis 2/2 aortic dissection    3) Hypertension  -per cardio recs, labetalol    4) Dementia  - c/w Namenda    5) Dyslipidemia  - c/w Lipitor    -f/u PT eval  Disposition: spoke to dr norris regarding dispo, pt to be downgraded to regular medicine floor  Full code status

## 2018-06-08 NOTE — DISCHARGE NOTE ADULT - HOSPITAL COURSE
86 y/o F with PMH of aortic aneurysm (diagnosed February 2017), DVT (diagnosed March, 2017) on eliquis, HTN, HLD, and dementia presented for acute onset of right chest pain, jaw pain, and R. arm paresthesias. She states she woke up around 6am the day of presentation with the pain. It feels like a tightness in her chest and a numbness/tingling sensation traveling from her right shoulder down the entire arm to her fingers. Symptoms persisted since onset and are still present at the time of admission. Vascular surgery was consulted in the ED, who recommended a CTA to look for aortic dissection. The test came back positive for ascending thoracic aortic dissection and aneurysm with intramural hematoma significantly worsened from the prior study of February 2017.  As per ED, discussion between CT surgery team and family ended in opting out of a surgical intervention so she was admitted to the Coronary Care Unit for medical management. Pt has been medically managed with labetalol, originally a drip then transitioned to PO. Patient also had CHF exacerbation, now stable on PO Lasix. Will ve discharged with home oxygen.

## 2018-06-08 NOTE — PROGRESS NOTE ADULT - SUBJECTIVE AND OBJECTIVE BOX
Patient is a 85y old Female who presents with a chief complaint of R. Chest pain, jaw pain, and R. arm paresthesias (08 Jun 2018 10:16)    Currently admitted to medicine with the primary diagnosis of Chest pain    Today is hospital day 7d.    BRIEF HOSPITAL COURSE:   86 y/o F with PMH of aortic aneurysm (diagnosed February 2017), DVT (diagnosed March, 2017) on eliquis, HTN, HLD, and dementia presented for acute onset of right chest pain, jaw pain, and R. arm paresthesias. She states she woke up around 6am the day of presentation with the pain. It feels like a tightness in her chest and a numbness/tingling sensation traveling from her right shoulder down the entire arm to her fingers. Symptoms persisted since onset and are still present at the time of admission. Vascular surgery was consulted in the ED, who recommended a CTA to look for aortic dissection. The test came back positive for ascending thoracic aortic dissection and aneurysm with intramural hematoma significantly worsened from the prior study of February 2017.  As per ED, discussion between CT surgery team and family ended in opting out of a surgical intervention so she was admitted to the Coronary Care Unit for medical management. Currently maintained on BB therapy.    EVENTS LAST 24HRS:   no overnight events    PAST MEDICAL & SURGICAL HISTORY  Dyslipidemia  Dementia  Thoracic aortic dissection  History of deep vein thrombosis  Hypertension, unspecified type  Status post hip surgery  H/O rotator cuff tear  H/O partial thyroidectomy      SOCIAL HISTORY:      REVIEW OF SYSTEMS:  See HPI    ALLERGIES:  NSAIDs (Hives; Urticaria)    MEDICATIONS:  STANDING MEDICATIONS  atorvastatin 80 milliGRAM(s) Oral at bedtime  deplin 15 milliGRAM(s) 15 milliGRAM(s) Oral daily  folic acid 1 milliGRAM(s) Oral daily  furosemide    Tablet 20 milliGRAM(s) Oral <User Schedule>  labetalol 50 milliGRAM(s) Oral three times a day  lamoTRIgine 100 milliGRAM(s) Oral daily  memantine 5 milliGRAM(s) Oral daily  memantine 10 milliGRAM(s) Oral at bedtime    PRN MEDICATIONS  acetaminophen   Tablet. 650 milliGRAM(s) Oral every 6 hours PRN    VITALS:         ICU Vital Signs Last 24 Hrs:    T(C): 36.9 (08 Jun 2018 08:08), Max: 37.4 (08 Jun 2018 00:00)  T(F): 98.4 (08 Jun 2018 08:08), Max: 99.4 (08 Jun 2018 00:00)  HR: 72 (08 Jun 2018 12:02) (54 - 72)  BP: 131/61 (08 Jun 2018 12:02) (102/46 - 156/84)  BP(mean): 88 (08 Jun 2018 12:02) (61 - 90)  ABP: --  ABP(mean): --  RR: 25 (08 Jun 2018 12:02) (20 - 36)  SpO2: 96% (08 Jun 2018 12:02) (93% - 98%)          I&O's Detail:      07 Jun 2018 07:01  -  08 Jun 2018 07:00  --------------------------------------------------------  IN:    Oral Fluid: 1200 mL  Total IN: 1200 mL    OUT:  Total OUT: 0 mL    Total NET: 1200 mL      08 Jun 2018 07:01  -  08 Jun 2018 12:23  --------------------------------------------------------  IN:    Oral Fluid: 120 mL  Total IN: 120 mL    OUT:    Voided: 200 mL  Total OUT: 200 mL    Total NET: -80 mL          LABS:                        8.3    9.43  )-----------( 223      ( 08 Jun 2018 04:05 )             26.4     06-08    137  |  102  |  21<H>  ----------------------------<  104<H>  4.3   |  20  |  1.2    Ca    8.1<L>      08 Jun 2018 04:05    TPro  5.6<L>  /  Alb  3.2<L>  /  TBili  0.8  /  DBili  x   /  AST  13  /  ALT  15  /  AlkPhos  107  06-07      CAPILLARY BLOOD GLUCOSE    CULTURES:      PHYSICAL EXAM:  CONSTITUTIONAL: Well-developed; well-nourished; in no acute distress.   SKIN: warm, dry  HEAD: Normocephalic; atraumatic.  EYES: PERRL, EOMI, no conjunctival erythema  ENT: No nasal discharge; airway clear, mucous membranes moist  NECK: Supple; non tender.  CARD: +S1, S2 Regular rate and rhythm.   RESP: decreased breath sounds at bases  ABD: soft ntnd  EXT: moves all extremities  NEURO: Alert, oriented, grossly unremarkable  PSYCH: Cooperative, appropriate.

## 2018-06-08 NOTE — DIETITIAN INITIAL EVALUATION ADULT. - DIET TYPE
pt reported eating 50% of today breakfast. But per family, she eats small breakfast, and then bigger usually for lunch and dinner. No need supplements because pt does eat./DASH/TLC (sodium and cholesterol restricted diet)

## 2018-06-08 NOTE — DIETITIAN INITIAL EVALUATION ADULT. - PT NOT SOURCE
other (specify)/LOS- alert and oriented sitting on chair. No edema. Skin intact. LBM 6/8 per pt. No oral/swallowing issue.

## 2018-06-08 NOTE — DIETITIAN INITIAL EVALUATION ADULT. - ENERGY NEEDS
9031-1979 kcal/day (MSJ x 1.2-1.3) BMI is 30, but with age, likely liberalize it.  50-58 g/day (1.1-1.3 g/kg of IBW)  per CCU

## 2018-06-08 NOTE — PHYSICAL THERAPY INITIAL EVALUATION ADULT - LEVEL OF INDEPENDENCE: SIT/STAND, REHAB EVAL
x2 trials 2* pt requested to use bedpan prior to ambulation. MATTHEW Conklin assisted with hygiene care/minimum assist (75% patients effort)

## 2018-06-08 NOTE — DISCHARGE NOTE ADULT - PLAN OF CARE
Medical Management Patient high risk for surgery, recommend medical management only. Continue with Labetalol 50mg every 6 hours and follow up with cardiologist and PMD in 1-2 weeks. Stable CHF is stable - continue with Lasix daily for now. Patient still requires home oxygen. Continue Sodium Bicarb as prescribed for now, follow up with Nephrologist in 1 week. Stop Eliquis for now as patient is high risk for bleed. Follow up with cardiologist

## 2018-06-08 NOTE — DISCHARGE NOTE ADULT - PROVIDER TOKENS
TOKANDREW:'[id number]',TOKANDREW:'[id number]' TOKEN:'[id number]',TOKEN:'[id number]',TOKEN:'[id number]'

## 2018-06-08 NOTE — DISCHARGE NOTE ADULT - CARE PROVIDERS DIRECT ADDRESSES
,DirectAddress_Unknown,[email or web address] ,DirectAddress_Unknown,[email or web address]

## 2018-06-08 NOTE — DISCHARGE NOTE ADULT - PATIENT PORTAL LINK FT
You can access the [facility] Patient Portal, offered by [facility], by registering with the following website: [email or web address]

## 2018-06-08 NOTE — DISCHARGE NOTE ADULT - CARE PLAN
Principal Discharge DX:	Ascending aortic dissection  Goal:	Medical Management  Assessment and plan of treatment:	Patient high risk for surgery, recommend medical management only. Continue with Labetalol 50mg every 6 hours and follow up with cardiologist and PMD in 1-2 weeks.  Secondary Diagnosis:	Diastolic CHF  Goal:	Stable  Assessment and plan of treatment:	CHF is stable - continue with Lasix daily for now. Patient still requires home oxygen. Principal Discharge DX:	Ascending aortic dissection  Goal:	Medical Management  Assessment and plan of treatment:	Patient high risk for surgery, recommend medical management only. Continue with Labetalol 50mg every 6 hours and follow up with cardiologist and PMD in 1-2 weeks.  Secondary Diagnosis:	Diastolic CHF  Goal:	Stable  Assessment and plan of treatment:	CHF is stable - continue with Lasix daily for now. Patient still requires home oxygen.  Secondary Diagnosis:	Metabolic acidosis  Goal:	Stable  Assessment and plan of treatment:	Continue Sodium Bicarb as prescribed for now, follow up with Nephrologist in 1 week. Principal Discharge DX:	Ascending aortic dissection  Goal:	Medical Management  Assessment and plan of treatment:	Patient high risk for surgery, recommend medical management only. Continue with Labetalol 50mg every 6 hours and follow up with cardiologist and PMD in 1-2 weeks.  Secondary Diagnosis:	Diastolic CHF  Goal:	Stable  Assessment and plan of treatment:	CHF is stable - continue with Lasix daily for now. Patient still requires home oxygen.  Secondary Diagnosis:	Metabolic acidosis  Goal:	Stable  Assessment and plan of treatment:	Continue Sodium Bicarb as prescribed for now, follow up with Nephrologist in 1 week.  Secondary Diagnosis:	History of deep vein thrombosis  Goal:	Medical Management  Assessment and plan of treatment:	Stop Eliquis for now as patient is high risk for bleed. Follow up with cardiologist

## 2018-06-08 NOTE — DISCHARGE NOTE ADULT - CARE PROVIDER_API CALL
David Corcoran), Internal Medicine  [address] [facility]  [address]  [place] [address]  Phone: [phone]  Fax: [phone]    Chinedu Giron), Cardiology; Interventional Cardiology  [address] [facility]  [address]  [place] [address]  Phone: [phone]  Fax: [phone] David Corcoran), Internal Medicine  [address] [facility] [address] [address]  [place] [address]  Phone: [phone]  Fax: [phone]    Chinedu Giron), Cardiology; Interventional Cardiology  [address] [facility] [address]  [place] [address]  Phone: [phone]  Fax: [phone]    Celi Ybarra), Internal Medicine; Nephrology  [address] [place] [address]  Phone: [phone]  Fax: [phone]

## 2018-06-08 NOTE — DISCHARGE NOTE ADULT - MEDICATION SUMMARY - MEDICATIONS TO STOP TAKING
I will STOP taking the medications listed below when I get home from the hospital:    atorvastatin 10 mg oral tablet  -- 1 tab(s) by mouth once a day (at bedtime) I will STOP taking the medications listed below when I get home from the hospital:    Eliquis 2.5 mg oral tablet  -- 1 tab(s) by mouth 2 times a day    atorvastatin 10 mg oral tablet  -- 1 tab(s) by mouth once a day (at bedtime)    lisinopril 10 mg oral tablet  -- 1 tab(s) by mouth every 12 hours

## 2018-06-08 NOTE — DIETITIAN INITIAL EVALUATION ADULT. - PROBLEM SELECTOR PLAN 1
Acute thoracic aortic dissection worsened from type B now reached the ascending aorta therefore type A  Patient's family now acquiring different opinions from CT surgeons regarding appropriate management. A-line placed, On labetalol for a target SBP of 110-130 and HR of around 60 as per Dr. Bradley's recommendations.  Check 2d echo, trend cardiac enzymes, will increase lipitor to 80mg qhs

## 2018-06-08 NOTE — PHYSICAL THERAPY INITIAL EVALUATION ADULT - GENERAL OBSERVATIONS, REHAB EVAL
10:55-11:33 Pt encountered sitting in b/s chair with monitor, IV lock, daughter, grand daughter &  @ b/s, in NAD. Pt with c/o weakness & fatigue, but with encouragement from daughter agreeable to PT. HR: 68bpm, SpO2 96% RA. Pt left same as found with monitor, IV lock, call bell, daughter & grand daughter @ b/s, in NAD. BP: 119/59, HR: 72bpm, SpO2 95% RA.

## 2018-06-09 RX ORDER — ALPRAZOLAM 0.25 MG
0.25 TABLET ORAL AT BEDTIME
Qty: 0 | Refills: 0 | Status: DISCONTINUED | OUTPATIENT
Start: 2018-06-09 | End: 2018-06-10

## 2018-06-09 RX ORDER — LABETALOL HCL 100 MG
50 TABLET ORAL EVERY 6 HOURS
Qty: 0 | Refills: 0 | Status: DISCONTINUED | OUTPATIENT
Start: 2018-06-09 | End: 2018-06-22

## 2018-06-09 RX ADMIN — Medication 50 MILLIGRAM(S): at 18:12

## 2018-06-09 RX ADMIN — Medication 650 MILLIGRAM(S): at 12:48

## 2018-06-09 RX ADMIN — Medication 50 MILLIGRAM(S): at 23:59

## 2018-06-09 RX ADMIN — Medication 650 MILLIGRAM(S): at 05:07

## 2018-06-09 RX ADMIN — Medication 50 MILLIGRAM(S): at 05:04

## 2018-06-09 RX ADMIN — Medication 1 MILLIGRAM(S): at 12:30

## 2018-06-09 RX ADMIN — Medication 650 MILLIGRAM(S): at 14:00

## 2018-06-09 RX ADMIN — Medication 650 MILLIGRAM(S): at 06:41

## 2018-06-09 RX ADMIN — Medication 50 MILLIGRAM(S): at 14:27

## 2018-06-09 RX ADMIN — Medication 650 MILLIGRAM(S): at 18:55

## 2018-06-09 RX ADMIN — Medication 650 MILLIGRAM(S): at 20:00

## 2018-06-09 RX ADMIN — LAMOTRIGINE 100 MILLIGRAM(S): 25 TABLET, ORALLY DISINTEGRATING ORAL at 12:30

## 2018-06-09 RX ADMIN — Medication 0.25 MILLIGRAM(S): at 21:03

## 2018-06-09 RX ADMIN — ATORVASTATIN CALCIUM 80 MILLIGRAM(S): 80 TABLET, FILM COATED ORAL at 21:17

## 2018-06-09 RX ADMIN — MEMANTINE HYDROCHLORIDE 10 MILLIGRAM(S): 10 TABLET ORAL at 21:03

## 2018-06-09 RX ADMIN — MEMANTINE HYDROCHLORIDE 5 MILLIGRAM(S): 10 TABLET ORAL at 12:30

## 2018-06-09 NOTE — PROGRESS NOTE ADULT - SUBJECTIVE AND OBJECTIVE BOX
Mrs Caceres is asymptomatic today, eating lunch up in the chair, with relatives at bedside.   She is slightly pale. The VS are stable  Lungs with scattered crackles bilaterally. Heart with S.Bradycardia at 56/min. No edema and no palpable pulses.

## 2018-06-09 NOTE — PROGRESS NOTE ADULT - ASSESSMENT
Overnight pt developed L anterior forearm pain, TTP wo erythema or edema. UE duplex ordered.     84 y/o F with PMH of aortic aneurysm (diagnosed February 2017), DVT (diagnosed March, 2017) on eliquis, HLD, HTN, and dementia presented for acute onset of right chest pain, jaw pain, and R. arm paresthesias found to have worsening of aortic dissection    1) Ascending aortic dissection (type A)  -c/w labetalol, dose adjusted for hypotension  -Transthoracic Echocardiogram (06.01.18 @ 17:35): 1. Spectral Doppler shows impaired relaxation pattern of left ventricular myocardial filling (Grade I diastolic dysfunction).  2. Sclerotic aortic valve with normal opening. 3. Dilatation of the aortic root. 4. AORTIC ROOT DILATED TO 3.9CM AND ASCENDING AORTA TO 4.9CM AND POSSIBLE DISECTION FLAP NOTED.  - CT Sx following: no surgical intervention given patient overall condition, medical management only    2) Hx of deep vein thrombosis  - hold Eliquis 2/2 aortic dissection  -f/u LUE duplex    3) Hypertension  -per cardio recs, labetalol    4) Dementia  - c/w Namenda    5) Dyslipidemia  - c/w Lipitor 80mg    -F/U PT recomendations  Disposition: downgrade to medical floor  Full code status 86 y/o F with PMH of aortic aneurysm (diagnosed February 2017), DVT (diagnosed March, 2017) on eliquis, HLD, HTN, and dementia presented for acute onset of right chest pain, jaw pain, and R. arm paresthesias found to have worsening of aortic dissection    1) Ascending aortic dissection (type A)  -c/w labetalol, dose adjusted for hypotension  -Transthoracic Echocardiogram (06.01.18 @ 17:35): 1. Spectral Doppler shows impaired relaxation pattern of left ventricular myocardial filling (Grade I diastolic dysfunction).  2. Sclerotic aortic valve with normal opening. 3. Dilatation of the aortic root. 4. AORTIC ROOT DILATED TO 3.9CM AND ASCENDING AORTA TO 4.9CM AND POSSIBLE DISECTION FLAP NOTED.  - CT Sx following: no surgical intervention given patient overall condition, medical management only    2) Hx of deep vein thrombosis  - hold Eliquis 2/2 aortic dissection  -f/u LUE duplex    3) Hypertension  -per cardio recs, labetalol    4) Dementia  - c/w Namenda    5) Dyslipidemia  - c/w Lipitor 80mg    -F/U PT recomendations  Disposition: downgrade to medical floor  Full code status

## 2018-06-09 NOTE — CHART NOTE - NSCHARTNOTEFT_GEN_A_CORE
Spoke to dr alves regarding pt,  recommends 0.25 xanax qhs and adjusting labetalol to 50mg q6h with same holding parameters as prior

## 2018-06-09 NOTE — PROVIDER CONTACT NOTE (EICU) - SITUATION
Dr. Romero notified that pt was given tylenol for shoulder pain. After 1.5 hrs pain scale went from 8 to 6

## 2018-06-09 NOTE — PROGRESS NOTE ADULT - SUBJECTIVE AND OBJECTIVE BOX
TIMUR SCAR  85y  Female    Patient is a 85y old  Female who presents with a chief complaint of R. Chest pain, jaw pain, and R. arm paresthesias (08 Jun 2018 10:16)      INTERVAL HPI/OVERNIGHT EVENTS: None    T(C): 36.6 (06-08-18 @ 20:00), Max: 36.9 (06-08-18 @ 04:00)  HR: 73 (06-08-18 @ 21:33) (54 - 80)  BP: 134/63 (06-08-18 @ 21:33) (112/50 - 151/57)  RR: 20 (06-08-18 @ 21:33) (18 - 36)  SpO2: 96% (06-08-18 @ 21:33) (93% - 98%)  Wt(kg): --Vital Signs Last 24 Hrs  T(C): 36.6 (08 Jun 2018 20:00), Max: 36.9 (08 Jun 2018 04:00)  T(F): 97.8 (08 Jun 2018 20:00), Max: 98.4 (08 Jun 2018 04:00)  HR: 73 (08 Jun 2018 21:33) (54 - 80)  BP: 134/63 (08 Jun 2018 21:33) (112/50 - 151/57)  BP(mean): 91 (08 Jun 2018 21:33) (74 - 91)  RR: 20 (08 Jun 2018 21:33) (18 - 36)  SpO2: 96% (08 Jun 2018 21:33) (93% - 98%)    PHYSICAL EXAM:  GENERAL: NAD, well-groomed, well-developed  HEAD:  Atraumatic, Normocephalic  NECK: Supple, No JVD, Normal thyroid  NERVOUS SYSTEM:  Alert & Oriented X3, Good concentration; Motor Strength 5/5 B/L upper and lower extremities; DTRs 2+ intact and symmetric  CHEST/LUNG: Clear to percussion bilaterally; No rales, rhonchi, wheezing, or rubs  HEART: Regular rate and rhythm; No murmurs, rubs, or gallops  ABDOMEN: Soft, Nontender, Nondistended; Bowel sounds present  EXTREMITIES:  2+ Peripheral Pulses, No clubbing, cyanosis, or edema    Consultant(s) Notes Reviewed:  [x ] YES  [ ] NO    Discussed with Consultants/Other Providers/Residents [ x] YES     LABS                         8.3    9.43  )-----------( 223      ( 08 Jun 2018 04:05 )             26.4     06-08    137  |  102  |  21<H>  ----------------------------<  104<H>  4.3   |  20  |  1.2    Ca    8.1<L>      08 Jun 2018 04:05    TPro  5.6<L>  /  Alb  3.2<L>  /  TBili  0.8  /  DBili  x   /  AST  13  /  ALT  15  /  AlkPhos  107  06-07          LIVER FUNCTIONS - ( 07 Jun 2018 04:16 )  Alb: 3.2 g/dL / Pro: 5.6 g/dL / ALK PHOS: 107 U/L / ALT: 15 U/L / AST: 13 U/L / GGT: x           CAPILLARY BLOOD GLUCOSE            RADIOLOGY & ADDITIONAL TESTS:    Imaging Personally Reviewed:  [x ] YES  [ ] NO    MEDICATIONS  (STANDING):  atorvastatin 80 milliGRAM(s) Oral at bedtime  deplin 15 milliGRAM(s) 15 milliGRAM(s) Oral daily  folic acid 1 milliGRAM(s) Oral daily  furosemide    Tablet 20 milliGRAM(s) Oral <User Schedule>  labetalol 50 milliGRAM(s) Oral three times a day  lamoTRIgine 100 milliGRAM(s) Oral daily  memantine 5 milliGRAM(s) Oral daily  memantine 10 milliGRAM(s) Oral at bedtime    MEDICATIONS  (PRN):  acetaminophen   Tablet. 650 milliGRAM(s) Oral every 6 hours PRN Mild Pain (1 - 3)      HEALTH ISSUES - PROBLEM Dx:  Dyslipidemia: Dyslipidemia  Dementia: Dementia  Hypertension, unspecified type: Hypertension, unspecified type  History of deep vein thrombosis: History of deep vein thrombosis  Ascending aortic dissection: Ascending aortic dissection

## 2018-06-09 NOTE — PROGRESS NOTE ADULT - SUBJECTIVE AND OBJECTIVE BOX
Patient is a 85y old Female who presents with a chief complaint of R. Chest pain, jaw pain, and R. arm paresthesias (08 Jun 2018 10:16)    Currently admitted to medicine with the primary diagnosis of Chest pain    Today is hospital day 8d.    BRIEF HOSPITAL COURSE:   86 y/o F with PMH of aortic aneurysm (diagnosed February 2017), DVT (diagnosed March, 2017) on eliquis, HTN, HLD, and dementia presented for acute onset of right chest pain, jaw pain, and R. arm paresthesias. She states she woke up around 6am the day of presentation with the pain. It feels like a tightness in her chest and a numbness/tingling sensation traveling from her right shoulder down the entire arm to her fingers. Symptoms persisted since onset and are still present at the time of admission. Vascular surgery was consulted in the ED, who recommended a CTA to look for aortic dissection. The test came back positive for ascending thoracic aortic dissection and aneurysm with intramural hematoma significantly worsened from the prior study of February 2017.  As per ED, discussion between CT surgery team and family ended in opting out of a surgical intervention so she was admitted to the Coronary Care Unit for medical management. Currently maintained on BB therapy.    EVENTS LAST 24HRS:   Overnight pt developed L anterior forearm pain, TTP wo erythema or edema. UE duplex ordered.     PAST MEDICAL & SURGICAL HISTORY  Dyslipidemia  Dementia  Thoracic aortic dissection  History of deep vein thrombosis  Hypertension, unspecified type  Status post hip surgery  H/O rotator cuff tear  H/O partial thyroidectomy      SOCIAL HISTORY:      REVIEW OF SYSTEMS:  See HPI    ALLERGIES:  NSAIDs (Hives; Urticaria)    MEDICATIONS:  STANDING MEDICATIONS  atorvastatin 80 milliGRAM(s) Oral at bedtime  deplin 15 milliGRAM(s) 15 milliGRAM(s) Oral daily  folic acid 1 milliGRAM(s) Oral daily  furosemide    Tablet 20 milliGRAM(s) Oral <User Schedule>  labetalol 50 milliGRAM(s) Oral three times a day  lamoTRIgine 100 milliGRAM(s) Oral daily  memantine 5 milliGRAM(s) Oral daily  memantine 10 milliGRAM(s) Oral at bedtime    PRN MEDICATIONS  acetaminophen   Tablet. 650 milliGRAM(s) Oral every 6 hours PRN    VITALS:         ICU Vital Signs Last 24 Hrs:    T(C): 36.7 (09 Jun 2018 04:00), Max: 36.9 (08 Jun 2018 16:10)  T(F): 98 (09 Jun 2018 04:00), Max: 98.4 (08 Jun 2018 16:10)  HR: 54 (09 Jun 2018 06:00) (54 - 80)  BP: 120/58 (09 Jun 2018 06:00) (117/55 - 154/64)  BP(mean): 82 (09 Jun 2018 06:00) (75 - 91)  ABP: --  ABP(mean): --  RR: 20 (09 Jun 2018 06:00) (18 - 36)  SpO2: 95% (09 Jun 2018 06:00) (93% - 96%)          I&O's Detail:      08 Jun 2018 07:01  -  09 Jun 2018 07:00  --------------------------------------------------------  IN:    Oral Fluid: 900 mL  Total IN: 900 mL    OUT:    Voided: 200 mL  Total OUT: 200 mL    Total NET: 700 mL          LABS:                        8.3    9.43  )-----------( 223      ( 08 Jun 2018 04:05 )             26.4     06-08    137  |  102  |  21<H>  ----------------------------<  104<H>  4.3   |  20  |  1.2    Ca    8.1<L>      08 Jun 2018 04:05            CAPILLARY BLOOD GLUCOSE            CULTURES:      PHYSICAL EXAM:  General: No acute distress.  Alert, oriented, interactive, nonfocal.  HEENT: Pupils equal, reactive to light symmetrically.  PULM: Clear to auscultation bilaterally,   CVS: Regular rate and rhythm, no murmurs, rubs, or gallops.  ABD: Soft, nondistended, nontender,   EXT: No edema, moves all extremities  SKIN: Warm, dry.

## 2018-06-10 LAB
ANION GAP SERPL CALC-SCNC: 19 MMOL/L — HIGH (ref 7–14)
BUN SERPL-MCNC: 22 MG/DL — HIGH (ref 10–20)
CALCIUM SERPL-MCNC: 8.2 MG/DL — LOW (ref 8.5–10.1)
CHLORIDE SERPL-SCNC: 99 MMOL/L — SIGNIFICANT CHANGE UP (ref 98–110)
CO2 SERPL-SCNC: 19 MMOL/L — SIGNIFICANT CHANGE UP (ref 17–32)
CREAT SERPL-MCNC: 1.2 MG/DL — SIGNIFICANT CHANGE UP (ref 0.7–1.5)
GLUCOSE SERPL-MCNC: 100 MG/DL — HIGH (ref 70–99)
HCT VFR BLD CALC: 25.8 % — LOW (ref 37–47)
HGB BLD-MCNC: 8.1 G/DL — LOW (ref 12–16)
MCHC RBC-ENTMCNC: 28.2 PG — SIGNIFICANT CHANGE UP (ref 27–31)
MCHC RBC-ENTMCNC: 31.4 G/DL — LOW (ref 32–37)
MCV RBC AUTO: 89.9 FL — SIGNIFICANT CHANGE UP (ref 81–99)
NRBC # BLD: 0 /100 WBCS — SIGNIFICANT CHANGE UP (ref 0–0)
PLATELET # BLD AUTO: 290 K/UL — SIGNIFICANT CHANGE UP (ref 130–400)
POTASSIUM SERPL-MCNC: 4.6 MMOL/L — SIGNIFICANT CHANGE UP (ref 3.5–5)
POTASSIUM SERPL-SCNC: 4.6 MMOL/L — SIGNIFICANT CHANGE UP (ref 3.5–5)
RBC # BLD: 2.87 M/UL — LOW (ref 4.2–5.4)
RBC # FLD: 14.2 % — SIGNIFICANT CHANGE UP (ref 11.5–14.5)
SODIUM SERPL-SCNC: 137 MMOL/L — SIGNIFICANT CHANGE UP (ref 135–146)
WBC # BLD: 11.86 K/UL — HIGH (ref 4.8–10.8)
WBC # FLD AUTO: 11.86 K/UL — HIGH (ref 4.8–10.8)

## 2018-06-10 PROCEDURE — 93971 EXTREMITY STUDY: CPT | Mod: 26

## 2018-06-10 RX ORDER — ALPRAZOLAM 0.25 MG
0.25 TABLET ORAL AT BEDTIME
Qty: 0 | Refills: 0 | Status: DISCONTINUED | OUTPATIENT
Start: 2018-06-10 | End: 2018-06-14

## 2018-06-10 RX ADMIN — ATORVASTATIN CALCIUM 80 MILLIGRAM(S): 80 TABLET, FILM COATED ORAL at 21:21

## 2018-06-10 RX ADMIN — Medication 650 MILLIGRAM(S): at 12:03

## 2018-06-10 RX ADMIN — Medication 650 MILLIGRAM(S): at 13:00

## 2018-06-10 RX ADMIN — LAMOTRIGINE 100 MILLIGRAM(S): 25 TABLET, ORALLY DISINTEGRATING ORAL at 11:04

## 2018-06-10 RX ADMIN — Medication 650 MILLIGRAM(S): at 21:51

## 2018-06-10 RX ADMIN — Medication 50 MILLIGRAM(S): at 06:19

## 2018-06-10 RX ADMIN — Medication 1 MILLIGRAM(S): at 11:04

## 2018-06-10 RX ADMIN — MEMANTINE HYDROCHLORIDE 5 MILLIGRAM(S): 10 TABLET ORAL at 11:08

## 2018-06-10 RX ADMIN — Medication 650 MILLIGRAM(S): at 22:25

## 2018-06-10 RX ADMIN — Medication 50 MILLIGRAM(S): at 12:04

## 2018-06-10 RX ADMIN — MEMANTINE HYDROCHLORIDE 10 MILLIGRAM(S): 10 TABLET ORAL at 21:21

## 2018-06-10 NOTE — PROGRESS NOTE ADULT - ASSESSMENT
86 y/o F with PMH of aortic aneurysm (diagnosed February 2017), DVT (diagnosed March, 2017) on eliquis, HLD, HTN, and dementia presented for acute onset of right chest pain, jaw pain, and R. arm paresthesias found to have worsening of aortic dissection    1) Ascending aortic dissection (type A) - c/w labetalol, dose adjusted for hypotension  - medical management only    2) Hypertension - per cardio recs, labetalol 50mg q6h    3) Dementia  - c/w Namenda    4) Dyslipidemia  - c/w Lipitor 80mg    Disposition: downgrade to telemetry  Full code status

## 2018-06-10 NOTE — PROGRESS NOTE ADULT - SUBJECTIVE AND OBJECTIVE BOX
Chief Complaint: Patient is a 85y old  Female who presents with a chief complaint of R. Chest pain, jaw pain, and R. arm paresthesias (06-08-18 @ 10:16)      PAST MEDICAL & SURGICAL HISTORY:  Dyslipidemia  Dementia  Thoracic aortic dissection  History of deep vein thrombosis  Hypertension, unspecified type  Status post hip surgery  H/O rotator cuff tear  H/O partial thyroidectomy      HPI:  84 y/o F with PMH of aortic aneurysm (diagnosed February 2017), DVT (diagnosed March, 2017) on eliquis, HTN, HLD, and dementia presented for acute onset of right chest pain, jaw pain, and R. arm paresthesias. She states she woke up around 6am the day of presentation with the pain. It feels like a tightness in her chest and a numbness/tingling sensation traveling from her right shoulder down the entire arm to her fingers. Symptoms persisted since onset and are still present at the time of admission. Vascular surgery was consulted in the ED, who recommended a CTA to look for aortic dissection. The test came back positive for ascending thoracic aortic dissection and aneurysm with intramural hematoma significantly worsened from the prior study of February 2017.  As per ED, discussion between CT surgery team and family ended in opting out of a surgical intervention so she was admitted to the Coronary Care Unit for medical management. (06-01-18 @ 15:15)      OVERNIGHT EVENTS:  NAEO    NSAIDs    MEDICATIONS  (STANDING):  ALPRAZolam 0.25 milliGRAM(s) Oral at bedtime  atorvastatin 80 milliGRAM(s) Oral at bedtime  deplin 15 milliGRAM(s) 15 milliGRAM(s) Oral daily  folic acid 1 milliGRAM(s) Oral daily  furosemide    Tablet 20 milliGRAM(s) Oral <User Schedule>  labetalol 50 milliGRAM(s) Oral every 6 hours  lamoTRIgine 100 milliGRAM(s) Oral daily  memantine 5 milliGRAM(s) Oral daily  memantine 10 milliGRAM(s) Oral at bedtime    MEDICATIONS  (PRN):  acetaminophen   Tablet. 650 milliGRAM(s) Oral every 6 hours PRN Mild Pain (1 - 3)      ICU Vital Signs Last 24 Hrs  T(C): 36.3 (10 Jeferson 2018 00:00), Max: 37.5 (09 Jun 2018 20:00)  T(F): 97.4 (10 Jeferson 2018 00:00), Max: 99.5 (09 Jun 2018 20:00)  HR: 66 (10 Jeferson 2018 04:00) (54 - 78)  BP: 126/56 (10 Jeferson 2018 04:00) (107/53 - 154/64)  BP(mean): 81 (10 Jeferson 2018 04:00) (72 - 103)  ABP: --  ABP(mean): --  RR: 25 (10 Jeferson 2018 04:00) (20 - 35)  SpO2: 95% (10 Jeferson 2018 04:00) (92% - 95%)        I&O's Detail    08 Jun 2018 07:01  -  09 Jun 2018 07:00  --------------------------------------------------------  IN:    Oral Fluid: 900 mL  Total IN: 900 mL    OUT:    Voided: 200 mL  Total OUT: 200 mL    Total NET: 700 mL      09 Jun 2018 07:01  -  10 Jeferson 2018 04:04  --------------------------------------------------------  IN:    Oral Fluid: 340 mL  Total IN: 340 mL    OUT:  Total OUT: 0 mL    Total NET: 340 mL          LABS:  CAPILLARY BLOOD GLUCOSE                              8.3<L>  9.43  )-----------( 223      ( 06-08-18 @ 04:05 )             26.4<L>    06-08-18 @ 04:05    137    |  102    |  21<H>  ----------------------------<  104<H>  4.3     |  20     |  1.2      Ca    8.1<L>      06-08-18 @ 04:05          RADIOLOGY:    PHYSICAL EXAM:    Constitutional: NAD    Respiratory: CTABL    Cardiovascular: S1 S2 heard, RRR, no r/m/g    Gastrointestinal: soft non tender non distended    Extremities: no edema    Neurological: AAOx3    Musculoskeletal: moving all extremities      Home Medications:  atorvastatin 10 mg oral tablet (06-01-18)  Deplin 15 mg oral tablet (06-01-18)  Eliquis 2.5 mg oral tablet (06-01-18)  lamoTRIgine 100 mg oral tablet, extended release (06-01-18)  Lasix 20 mg oral tablet (06-01-18)  lisinopril 10 mg oral tablet (06-01-18)  Namenda 10 mg oral tablet (06-01-18)  Namenda 5 mg oral tablet (06-01-18)

## 2018-06-10 NOTE — PROGRESS NOTE ADULT - SUBJECTIVE AND OBJECTIVE BOX
Mrs Caceres is asymptomatic today, denying CP/SOB.  The VS are stable.  Lungs are clearer and heart rhythm a steady NSR at 64/min/

## 2018-06-10 NOTE — PROGRESS NOTE ADULT - SUBJECTIVE AND OBJECTIVE BOX
ZULEMASCAR RAMIREZ  85y  Female    Patient is a 85y old  Female who presents with a chief complaint of R. Chest pain, jaw pain, and R. arm paresthesias (08 Jun 2018 10:16)      INTERVAL HPI/OVERNIGHT EVENTS: None    T(C): 36.8 (06-10-18 @ 18:18), Max: 37.5 (06-09-18 @ 20:00)  HR: 77 (06-10-18 @ 18:18) (60 - 77)  BP: 105/51 (06-10-18 @ 18:18) (76/39 - 137/61)  RR: 20 (06-10-18 @ 18:18) (20 - 32)  SpO2: 95% (06-10-18 @ 17:08) (92% - 98%)  Wt(kg): --Vital Signs Last 24 Hrs  T(C): 36.8 (10 Jeferson 2018 18:18), Max: 37.5 (09 Jun 2018 20:00)  T(F): 98.3 (10 Jeferson 2018 18:18), Max: 99.5 (09 Jun 2018 20:00)  HR: 77 (10 Jeferson 2018 18:18) (60 - 77)  BP: 105/51 (10 Jeferson 2018 18:18) (76/39 - 137/61)  BP(mean): 80 (10 Jeferson 2018 17:08) (57 - 99)  RR: 20 (10 Jeferson 2018 18:18) (20 - 32)  SpO2: 95% (10 Jeferson 2018 17:08) (92% - 98%)    PHYSICAL EXAM:  GENERAL: NAD, well-groomed, well-developed  HEAD:  Atraumatic, Normocephalic  NECK: Supple, No JVD, Normal thyroid  NERVOUS SYSTEM:  Alert & Oriented X3, Good concentration; Motor Strength 5/5 B/L upper and lower extremities; DTRs 2+ intact and symmetric  CHEST/LUNG: Clear to percussion bilaterally; No rales, rhonchi, wheezing, or rubs  HEART: Regular rate and rhythm; No murmurs, rubs, or gallops  ABDOMEN: Soft, Nontender, Nondistended; Bowel sounds present  EXTREMITIES:  2+ Peripheral Pulses, No clubbing, cyanosis, or edema    Consultant(s) Notes Reviewed:  [x ] YES  [ ] NO    Discussed with Consultants/Other Providers/Residents [ x] YES     LABS                         8.1    11.86 )-----------( 290      ( 10 Jeferson 2018 05:18 )             25.8     06-10    137  |  99  |  22<H>  ----------------------------<  100<H>  4.6   |  19  |  1.2    Ca    8.2<L>      10 Jeferson 2018 05:18              CAPILLARY BLOOD GLUCOSE            RADIOLOGY & ADDITIONAL TESTS:    Imaging Personally Reviewed:  [x ] YES  [ ] NO    MEDICATIONS  (STANDING):  atorvastatin 80 milliGRAM(s) Oral at bedtime  deplin 15 milliGRAM(s) 15 milliGRAM(s) Oral daily  folic acid 1 milliGRAM(s) Oral daily  furosemide    Tablet 20 milliGRAM(s) Oral <User Schedule>  labetalol 50 milliGRAM(s) Oral every 6 hours  lamoTRIgine 100 milliGRAM(s) Oral daily  memantine 5 milliGRAM(s) Oral daily  memantine 10 milliGRAM(s) Oral at bedtime    MEDICATIONS  (PRN):  acetaminophen   Tablet. 650 milliGRAM(s) Oral every 6 hours PRN Mild Pain (1 - 3)  ALPRAZolam 0.25 milliGRAM(s) Oral at bedtime PRN anxiety      HEALTH ISSUES - PROBLEM Dx:  Dyslipidemia: Dyslipidemia  Dementia: Dementia  Hypertension, unspecified type: Hypertension, unspecified type  History of deep vein thrombosis: History of deep vein thrombosis  Ascending aortic dissection: Ascending aortic dissection

## 2018-06-11 LAB
ANION GAP SERPL CALC-SCNC: 12 MMOL/L — SIGNIFICANT CHANGE UP (ref 7–14)
BASOPHILS # BLD AUTO: 0.02 K/UL — SIGNIFICANT CHANGE UP (ref 0–0.2)
BASOPHILS NFR BLD AUTO: 0.2 % — SIGNIFICANT CHANGE UP (ref 0–1)
BLD GP AB SCN SERPL QL: SIGNIFICANT CHANGE UP
BUN SERPL-MCNC: 29 MG/DL — HIGH (ref 10–20)
CALCIUM SERPL-MCNC: 8.4 MG/DL — LOW (ref 8.5–10.1)
CHLORIDE SERPL-SCNC: 100 MMOL/L — SIGNIFICANT CHANGE UP (ref 98–110)
CO2 SERPL-SCNC: 20 MMOL/L — SIGNIFICANT CHANGE UP (ref 17–32)
CREAT SERPL-MCNC: 1.4 MG/DL — SIGNIFICANT CHANGE UP (ref 0.7–1.5)
EOSINOPHIL # BLD AUTO: 0.11 K/UL — SIGNIFICANT CHANGE UP (ref 0–0.7)
EOSINOPHIL NFR BLD AUTO: 0.9 % — SIGNIFICANT CHANGE UP (ref 0–8)
GLUCOSE SERPL-MCNC: 109 MG/DL — HIGH (ref 70–99)
HCT VFR BLD CALC: 25.6 % — LOW (ref 37–47)
HGB BLD-MCNC: 7.8 G/DL — LOW (ref 12–16)
IMM GRANULOCYTES NFR BLD AUTO: 0.9 % — HIGH (ref 0.1–0.3)
LYMPHOCYTES # BLD AUTO: 0.71 K/UL — LOW (ref 1.2–3.4)
LYMPHOCYTES # BLD AUTO: 6.1 % — LOW (ref 20.5–51.1)
MCHC RBC-ENTMCNC: 27.6 PG — SIGNIFICANT CHANGE UP (ref 27–31)
MCHC RBC-ENTMCNC: 30.5 G/DL — LOW (ref 32–37)
MCV RBC AUTO: 90.5 FL — SIGNIFICANT CHANGE UP (ref 81–99)
MONOCYTES # BLD AUTO: 1.79 K/UL — HIGH (ref 0.1–0.6)
MONOCYTES NFR BLD AUTO: 15.3 % — HIGH (ref 1.7–9.3)
NEUTROPHILS # BLD AUTO: 8.98 K/UL — HIGH (ref 1.4–6.5)
NEUTROPHILS NFR BLD AUTO: 76.6 % — HIGH (ref 42.2–75.2)
NRBC # BLD: 0 /100 WBCS — SIGNIFICANT CHANGE UP (ref 0–0)
PLATELET # BLD AUTO: 279 K/UL — SIGNIFICANT CHANGE UP (ref 130–400)
POTASSIUM SERPL-MCNC: 4.6 MMOL/L — SIGNIFICANT CHANGE UP (ref 3.5–5)
POTASSIUM SERPL-SCNC: 4.6 MMOL/L — SIGNIFICANT CHANGE UP (ref 3.5–5)
RBC # BLD: 2.83 M/UL — LOW (ref 4.2–5.4)
RBC # FLD: 14.4 % — SIGNIFICANT CHANGE UP (ref 11.5–14.5)
SODIUM SERPL-SCNC: 132 MMOL/L — LOW (ref 135–146)
TYPE + AB SCN PNL BLD: SIGNIFICANT CHANGE UP
WBC # BLD: 11.71 K/UL — HIGH (ref 4.8–10.8)
WBC # FLD AUTO: 11.71 K/UL — HIGH (ref 4.8–10.8)

## 2018-06-11 RX ORDER — MORPHINE SULFATE 50 MG/1
2 CAPSULE, EXTENDED RELEASE ORAL EVERY 6 HOURS
Qty: 0 | Refills: 0 | Status: DISCONTINUED | OUTPATIENT
Start: 2018-06-11 | End: 2018-06-14

## 2018-06-11 RX ADMIN — LAMOTRIGINE 100 MILLIGRAM(S): 25 TABLET, ORALLY DISINTEGRATING ORAL at 11:04

## 2018-06-11 RX ADMIN — MEMANTINE HYDROCHLORIDE 5 MILLIGRAM(S): 10 TABLET ORAL at 11:04

## 2018-06-11 RX ADMIN — Medication 650 MILLIGRAM(S): at 13:46

## 2018-06-11 RX ADMIN — Medication 650 MILLIGRAM(S): at 20:39

## 2018-06-11 RX ADMIN — Medication 50 MILLIGRAM(S): at 11:03

## 2018-06-11 RX ADMIN — Medication 50 MILLIGRAM(S): at 17:44

## 2018-06-11 RX ADMIN — MORPHINE SULFATE 2 MILLIGRAM(S): 50 CAPSULE, EXTENDED RELEASE ORAL at 23:56

## 2018-06-11 RX ADMIN — Medication 50 MILLIGRAM(S): at 23:54

## 2018-06-11 RX ADMIN — Medication 50 MILLIGRAM(S): at 00:00

## 2018-06-11 RX ADMIN — Medication 50 MILLIGRAM(S): at 05:54

## 2018-06-11 RX ADMIN — ATORVASTATIN CALCIUM 80 MILLIGRAM(S): 80 TABLET, FILM COATED ORAL at 21:07

## 2018-06-11 RX ADMIN — MEMANTINE HYDROCHLORIDE 10 MILLIGRAM(S): 10 TABLET ORAL at 21:07

## 2018-06-11 RX ADMIN — Medication 20 MILLIGRAM(S): at 10:57

## 2018-06-11 NOTE — PROGRESS NOTE ADULT - ASSESSMENT
86 y/o F with PMH of aortic aneurysm (diagnosed February 2017), DVT (diagnosed March, 2017) on eliquis, HLD, HTN, and dementia presented for acute onset of right chest pain, jaw pain, and R. arm paresthesias found to have worsening of aortic dissection    # Ascending aortic dissection (type A) -   c/w labetalol  BP is well contolled.  Still has on and off right chest pain, radiating to B/l shoulder, Repeat EKG discussed done this AM, showed no changes, Discussed with Cardiology Dr. Callaway, c/w medical management on general floor for now    # Hypertension -   per cardio recs, labetalol 50mg q6h    # Anemia:  No active source of bleeding  will send stool for occult bleeding  will monitor    # Dementia  - c/w Memantine    # Dyslipidemia  - c/w Lipitor 80mg    Disposition: PT rehab pending  Full code status

## 2018-06-11 NOTE — PROGRESS NOTE ADULT - SUBJECTIVE AND OBJECTIVE BOX
Patient is a 85y old  Female who presents with a chief complaint of R. Chest pain, jaw pain, and R. arm paresthesias (08 Jun 2018 10:16)    Interval events:  She was transferred from CCU yesterday.  Complains of having mild chest pain this AM, the quality is similar to one she had when she came in. She has been having this pain on and off since she came in. She doesnt look to be in distress      PAST MEDICAL & SURGICAL HISTORY:  Dyslipidemia  Dementia  Thoracic aortic dissection  History of deep vein thrombosis  Hypertension, unspecified type  Status post hip surgery  H/O rotator cuff tear  H/O partial thyroidectomy      MEDICATIONS  (STANDING):  atorvastatin 80 milliGRAM(s) Oral at bedtime  deplin 15 milliGRAM(s) 15 milliGRAM(s) Oral daily  folic acid 1 milliGRAM(s) Oral daily  furosemide    Tablet 20 milliGRAM(s) Oral <User Schedule>  labetalol 50 milliGRAM(s) Oral every 6 hours  lamoTRIgine 100 milliGRAM(s) Oral daily  memantine 5 milliGRAM(s) Oral daily  memantine 10 milliGRAM(s) Oral at bedtime    MEDICATIONS  (PRN):  acetaminophen   Tablet. 650 milliGRAM(s) Oral every 6 hours PRN Mild Pain (1 - 3)  ALPRAZolam 0.25 milliGRAM(s) Oral at bedtime PRN anxiety        Vital Signs Last 24 Hrs  T(C): 36.9 (11 Jun 2018 12:06), Max: 38 (10 Jeferson 2018 22:12)  T(F): 98.4 (11 Jun 2018 12:06), Max: 100.4 (10 Jeferson 2018 22:12)  HR: 65 (11 Jun 2018 12:06) (62 - 79)  BP: 135/- (11 Jun 2018 12:06) (76/39 - 135/-)  BP(mean): 78 (10 Jeferson 2018 20:00) (57 - 80)  RR: 18 (11 Jun 2018 05:00) (18 - 28)  SpO2: 92% (10 Jeferson 2018 20:00) (92% - 98%)  CAPILLARY BLOOD GLUCOSE        I&O's Summary      Physical Exam:    -     General : sitting on chair, NAD    -      HEENT: PERLAA    -      Cardiac: Regular rate and rhythm    -      Pulm: b/l clear    -      GI: soft non tender    -      Musculoskeletal: no edema    -      Neuro: AO x 2, non focal        Labs:                        7.8    11.71 )-----------( 279      ( 11 Jun 2018 06:41 )             25.6             06-11    132<L>  |  100  |  29<H>  ----------------------------<  109<H>  4.6   |  20  |  1.4    Ca    8.4<L>      11 Jun 2018 06:41                  Imaging:    ECG:

## 2018-06-11 NOTE — CONSULT NOTE ADULT - ASSESSMENT
IMPRESSION: Rehab of debility secondary to TAA    PRECAUTIONS: [x  ] Cardiac  [  ] Respiratory  [  ] Seizures [  ] Contact Isolation  [  ] Droplet Isolation  [  ] Other    Weight Bearing Status:     RECOMMENDATION:  Don't do PT if SBP greater than 140    Out of Bed to Chair     DVT/Decubiti Prophylaxis    REHAB PLAN:     [ xx  ] Bedside P/T 3-5 times a week   [   ]   Bedside O/T  2-3 times a week             [   ] No Rehab Therapy Indicated                   [   ]  Speech Therapy   Conditioning/ROM                                    ADL  Bed Mobility                                               Conditioning/ROM  Transfers                                                     Bed Mobility  Sitting /Standing Balance                         Transfers                                        Gait Training                                               Sitting/Standing Balance  Stair Training [   ]Applicable                    Home equipment Eval                                                                        Splinting  [   ] Only      GOALS:   ADL   [   ]   Independent                    Transfers  [ x  ] Independent                          Ambulation  [ x  ] Independent     [ x   ] With device                            x[  x ]  CG                                                         [   ]  CG                                                                  [   ] CG                            [    ] Min A                                                   [   ] Min A                                                              [   ] Min  A          DISCHARGE PLAN:   [   ]  Good candidate for Intensive Rehabilitation/Hospital based-[facility]A [facility]                                             Will tolerate 3hrs Intensive Rehab Daily                                       [    ]  Short Term Rehab in Skilled Nursing Facility                                       [ xx   ]  Home with Outpatient or VN services                                         [    ]  Possible Candidate for Intensive Hospital based Rehab

## 2018-06-11 NOTE — PROGRESS NOTE ADULT - SUBJECTIVE AND OBJECTIVE BOX
SCAR DING  Patient is a 85y old  Female who presents with a chief complaint of R. Chest pain, jaw pain, and R. arm paresthesias (08 Jun 2018 10:16)    Downgrasdded from ICU remains clinically stable  CHart reviewed      Consultant(s) Notes Reviewed:  [x ] YES  [ ] NO    LABS                         8.1    11.86 )-----------( 290      ( 10 Jeferson 2018 05:18 )             25.8     06-10    137  |  99  |  22<H>  ----------------------------<  100<H>  4.6   |  19  |  1.2    Ca    8.2<L>      10 Jeferson 2018 05:18      Todays L pending                    MEDICATIONS  (STANDING):  atorvastatin 80 milliGRAM(s) Oral at bedtime  deplin 15 milliGRAM(s) 15 milliGRAM(s) Oral daily  folic acid 1 milliGRAM(s) Oral daily  furosemide    Tablet 20 milliGRAM(s) Oral <User Schedule>  labetalol 50 milliGRAM(s) Oral every 6 hours  lamoTRIgine 100 milliGRAM(s) Oral daily  memantine 5 milliGRAM(s) Oral daily  memantine 10 milliGRAM(s) Oral at bedtime    MEDICATIONS  (PRN):  acetaminophen   Tablet. 650 milliGRAM(s) Oral every 6 hours PRN Mild Pain (1 - 3)  ALPRAZolam 0.25 milliGRAM(s) Oral at bedtime PRN anxiety      HEALTH ISSUES - PROBLEM Dx:  Dyslipidemia: Dyslipidemia  Dementia: Dementia  Hypertension, unspecified type: Hypertension, unspecified type  History of deep vein thrombosis: History of deep vein thrombosis  Ascending aortic dissection: Ascending aortic dissection          Attending Attestation:   86 y/o F with PMH of aortic aneurysm (diagnosed February 2017), DVT (diagnosed March, 2017) on eliquis, HLD, HTN, and dementia presented for acute onset of right chest pain, jaw pain, and R. arm paresthesias found to have worsening of aortic dissection now reaching the ascending aorta therefore type A.      1.  Ascending aortic dissection        CT surgery eval- conservative management 2/2 high risk Sx     On labetalol PO 50 q8 per Cardio recommendations       DC planning medically optimized      2. DVT      Hold Eliquis in view of active dissection.     3 HTN      Hold Lisinopril now on labetalol     4.  Dementia      On Namenda.      5. HLD      On lipitor.     I was physically present for the key portions of the evaluation and management (E/M) service provided.  I agree with the above history, physical, and plan which I have reviewed and edited where appropriate.     Plan discussed with Pt and NS at .      Electronic Signatures:  Behzad Redd)  (Signed 10-Jeferson-2018 19:11)  	Authored: Progress Note, Subjective and Objective, Attending Attestation      Last Updated: 10-Jeferson-2018 19:11 by Behzad Redd)

## 2018-06-11 NOTE — PROGRESS NOTE ADULT - SUBJECTIVE AND OBJECTIVE BOX
SUBJ:  She is c/o off and on chest pain.    MEDICATIONS  (STANDING):  atorvastatin 80 milliGRAM(s) Oral at bedtime  deplin 15 milliGRAM(s) 15 milliGRAM(s) Oral daily  folic acid 1 milliGRAM(s) Oral daily  furosemide    Tablet 20 milliGRAM(s) Oral <User Schedule>  labetalol 50 milliGRAM(s) Oral every 6 hours  lamoTRIgine 100 milliGRAM(s) Oral daily  memantine 5 milliGRAM(s) Oral daily  memantine 10 milliGRAM(s) Oral at bedtime    MEDICATIONS  (PRN):  acetaminophen   Tablet. 650 milliGRAM(s) Oral every 6 hours PRN Mild Pain (1 - 3)  ALPRAZolam 0.25 milliGRAM(s) Oral at bedtime PRN anxiety    Vital Signs Last 24 Hrs  T(C): 36.9 (11 Jun 2018 12:06), Max: 38 (10 Jeferson 2018 22:12)  T(F): 98.4 (11 Jun 2018 12:06), Max: 100.4 (10 Jeferson 2018 22:12)  HR: 65 (11 Jun 2018 12:06) (65 - 79)  BP: 135/- (11 Jun 2018 12:06) (128/59 - 135/-)  BP(mean): 78 (10 Jeferson 2018 20:00) (78 - 78)  RR: 18 (11 Jun 2018 05:00) (18 - 20)  SpO2: 92% (10 Jeferson 2018 20:00) (92% - 92%)        PHYSICAL EXAM:  · CONSTITUTIONAL:	Well-developed, well nourished    BMI-  ·RESPIRATORY:   airway patent; breath sounds equal; good air movement; respirations non-labored; clear to auscultation bilaterally; no chest wall tenderness; no intercostal retractions; no rales, rhonchi or wheeze  · CARDIOVASCULAR	regular rate and rhythm  no rub  no murmur  normal PMI  · EXTREMITIES: No cyanosis, clubbing or edema    LABS:                        7.8    11.71 )-----------( 279      ( 11 Jun 2018 06:41 )             25.6     06-11    132<L>  |  100  |  29<H>  ----------------------------<  109<H>  4.6   |  20  |  1.4    Ca    8.4<L>      11 Jun 2018 06:41              I&O's Summary    11 Jun 2018 07:01  -  11 Jun 2018 19:40  --------------------------------------------------------  IN: 0 mL / OUT: 1 mL / NET: -1 mL      Assessment and Plan:   · Assessment		  Assessment and Recommendation:   Problem/Recommendation - 1:  Problem: Ascending aortic dissection. Recommendation: Medical treatment.  Continue Labetolol 50mg po q6h.   Out of bed to chair and ambulate.  High risk for surgical intervention.    Problem/Recommendation - 2:  ·  Problem: Hypertension, unspecified type.  Recommendation: monitor BP  Problem/Recommendation - 3:  ·  Problem: History of deep vein thrombosis.  Recommendation: D/C eliquis.     Problem/Recommendation - 4:  ·  Problem: Dementia.  Recommendation: supportive care.

## 2018-06-12 LAB
ANION GAP SERPL CALC-SCNC: 15 MMOL/L — HIGH (ref 7–14)
APPEARANCE UR: (no result)
BACTERIA # UR AUTO: (no result) /HPF
BILIRUB UR-MCNC: (no result)
BUN SERPL-MCNC: 40 MG/DL — HIGH (ref 10–20)
CALCIUM SERPL-MCNC: 8.3 MG/DL — LOW (ref 8.5–10.1)
CHLORIDE SERPL-SCNC: 101 MMOL/L — SIGNIFICANT CHANGE UP (ref 98–110)
CO2 SERPL-SCNC: 18 MMOL/L — SIGNIFICANT CHANGE UP (ref 17–32)
COLOR SPEC: SIGNIFICANT CHANGE UP
CREAT SERPL-MCNC: 1.7 MG/DL — HIGH (ref 0.7–1.5)
DIFF PNL FLD: NEGATIVE — SIGNIFICANT CHANGE UP
EPI CELLS # UR: (no result) /HPF
GLUCOSE SERPL-MCNC: 115 MG/DL — HIGH (ref 70–99)
GLUCOSE UR QL: NEGATIVE MG/DL — SIGNIFICANT CHANGE UP
HCT VFR BLD CALC: 24.3 % — LOW (ref 37–47)
HGB BLD-MCNC: 7.5 G/DL — LOW (ref 12–16)
KETONES UR-MCNC: NEGATIVE — SIGNIFICANT CHANGE UP
LEUKOCYTE ESTERASE UR-ACNC: (no result)
MCHC RBC-ENTMCNC: 27.8 PG — SIGNIFICANT CHANGE UP (ref 27–31)
MCHC RBC-ENTMCNC: 30.9 G/DL — LOW (ref 32–37)
MCV RBC AUTO: 90 FL — SIGNIFICANT CHANGE UP (ref 81–99)
NITRITE UR-MCNC: NEGATIVE — SIGNIFICANT CHANGE UP
NRBC # BLD: 0 /100 WBCS — SIGNIFICANT CHANGE UP (ref 0–0)
PH UR: 5.5 — SIGNIFICANT CHANGE UP (ref 5–8)
PLATELET # BLD AUTO: 288 K/UL — SIGNIFICANT CHANGE UP (ref 130–400)
POTASSIUM SERPL-MCNC: 5.2 MMOL/L — HIGH (ref 3.5–5)
POTASSIUM SERPL-SCNC: 5.2 MMOL/L — HIGH (ref 3.5–5)
POTASSIUM UR-SCNC: 68 MMOL/L — SIGNIFICANT CHANGE UP
PROT UR-MCNC: 30 MG/DL
RBC # BLD: 2.7 M/UL — LOW (ref 4.2–5.4)
RBC # FLD: 14.7 % — HIGH (ref 11.5–14.5)
SODIUM SERPL-SCNC: 134 MMOL/L — LOW (ref 135–146)
SODIUM UR-SCNC: <20 MMOL/L — SIGNIFICANT CHANGE UP
SP GR SPEC: 1.02 — SIGNIFICANT CHANGE UP (ref 1.01–1.03)
UROBILINOGEN FLD QL: 1 MG/DL (ref 0.2–0.2)
WBC # BLD: 12.03 K/UL — HIGH (ref 4.8–10.8)
WBC # FLD AUTO: 12.03 K/UL — HIGH (ref 4.8–10.8)
WBC UR QL: >50 /HPF

## 2018-06-12 RX ORDER — CEFTRIAXONE 500 MG/1
1 INJECTION, POWDER, FOR SOLUTION INTRAMUSCULAR; INTRAVENOUS
Qty: 0 | Refills: 0 | Status: DISCONTINUED | OUTPATIENT
Start: 2018-06-12 | End: 2018-06-18

## 2018-06-12 RX ADMIN — MORPHINE SULFATE 2 MILLIGRAM(S): 50 CAPSULE, EXTENDED RELEASE ORAL at 20:24

## 2018-06-12 RX ADMIN — Medication 650 MILLIGRAM(S): at 11:18

## 2018-06-12 RX ADMIN — MORPHINE SULFATE 2 MILLIGRAM(S): 50 CAPSULE, EXTENDED RELEASE ORAL at 21:22

## 2018-06-12 RX ADMIN — MEMANTINE HYDROCHLORIDE 5 MILLIGRAM(S): 10 TABLET ORAL at 11:22

## 2018-06-12 RX ADMIN — MORPHINE SULFATE 2 MILLIGRAM(S): 50 CAPSULE, EXTENDED RELEASE ORAL at 01:00

## 2018-06-12 RX ADMIN — ATORVASTATIN CALCIUM 80 MILLIGRAM(S): 80 TABLET, FILM COATED ORAL at 21:22

## 2018-06-12 RX ADMIN — Medication 1 MILLIGRAM(S): at 11:22

## 2018-06-12 RX ADMIN — MEMANTINE HYDROCHLORIDE 10 MILLIGRAM(S): 10 TABLET ORAL at 21:22

## 2018-06-12 RX ADMIN — Medication 50 MILLIGRAM(S): at 17:39

## 2018-06-12 RX ADMIN — LAMOTRIGINE 100 MILLIGRAM(S): 25 TABLET, ORALLY DISINTEGRATING ORAL at 11:22

## 2018-06-12 RX ADMIN — Medication 650 MILLIGRAM(S): at 11:45

## 2018-06-12 RX ADMIN — CEFTRIAXONE 100 GRAM(S): 500 INJECTION, POWDER, FOR SOLUTION INTRAMUSCULAR; INTRAVENOUS at 22:36

## 2018-06-12 NOTE — PROGRESS NOTE ADULT - SUBJECTIVE AND OBJECTIVE BOX
Patient's chart reviewed and patient examined by Chinedu Giron -[phone]    SUBJ: dyspnea, bilateral shoulder pain.    Pt with hypertension, Type A aortic dissection, CKD, paroxysmal AFib, aortic regurgitation.      MEDICATIONS  (STANDING):  atorvastatin 80 milliGRAM(s) Oral at bedtime  cefTRIAXone   IVPB 1 Gram(s) IV Intermittent <User Schedule>  deplin 15 milliGRAM(s) 15 milliGRAM(s) Oral daily  folic acid 1 milliGRAM(s) Oral daily  furosemide    Tablet 20 milliGRAM(s) Oral <User Schedule>  labetalol 50 milliGRAM(s) Oral every 6 hours  lamoTRIgine 100 milliGRAM(s) Oral daily  memantine 5 milliGRAM(s) Oral daily  memantine 10 milliGRAM(s) Oral at bedtime    MEDICATIONS  (PRN):  acetaminophen   Tablet. 650 milliGRAM(s) Oral every 6 hours PRN Mild Pain (1 - 3)  ALPRAZolam 0.25 milliGRAM(s) Oral at bedtime PRN anxiety  morphine  - Injectable 2 milliGRAM(s) IV Push every 6 hours PRN Moderate Pain (4 - 6)    Vital Signs Last 24 Hrs  T(C): 36.7 (12 Jun 2018 12:55), Max: 36.7 (12 Jun 2018 12:55)  T(F): 98 (12 Jun 2018 12:55), Max: 98 (12 Jun 2018 12:55)  HR: 73 (12 Jun 2018 17:36) (58 - 73)  BP: 138/65 (12 Jun 2018 17:36) (90/55 - 138/65)  BP(mean): --  RR: 20 (12 Jun 2018 17:36) (18 - 92)  SpO2: 97% (12 Jun 2018 06:45) (97% - 97%)     REVIEW OF SYSTEMS:  CONSTITUTIONAL: No fever, chills but has severe fatigue  CARDIOLOGY: Denies chest pain.   RESPIRATORY: has shortness of breath but no  wheezing.   NEUROLOGICAL: No weakness, no focal deficits to report.  GI: no BRBPR, denies nausea, Vomiting or diarrhea.    PSYCHIATRY: hx of mild dementia    PHYSICAL EXAM:  · CONSTITUTIONAL:	Well-developed, Female in mild resp distress  ·RESPIRATORY:   breath sounds diminished L>R; few rales in left, No rhonchi or wheeze  · CARDIOVASCULAR	S1 and S2 normal intensity, no rub, gallop. 2/6 NEO at base.     ABDOMEN: soft, non-tender, NABS  · EXTREMITIES: No cyanosis, clubbing and has 1+ edema of all 4 extremities.  NEURO: alert and oriented x 2, no focal deficits. Depressed mood and affect  · VASCULAR: 	Equal and normal pulses   	      LABS:  Hb was over 11 on admission.                        7.5    12.03 )-----------( 288      ( 12 Jun 2018 07:43 )             24.3     06-12    134<L>  |  101  |  40<H>  ----------------------------<  115<H>  5.2<H>   |  18  |  1.7<H>    Ca    8.3<L>      12 Jun 2018 07:43              I&O's Summary    11 Jun 2018 07:01  -  12 Jun 2018 07:00  --------------------------------------------------------  IN: 0 mL / OUT: 1 mL / NET: -1 mL      BNP  RADIOLOGY & ADDITIONAL STUDIES:    IMPRESSION AND PLAN:

## 2018-06-12 NOTE — PROGRESS NOTE ADULT - ASSESSMENT
86 y/o F with PMH of aortic aneurysm (diagnosed February 2017), DVT (diagnosed March, 2017) on eliquis, HLD, HTN, and dementia presented for acute onset of right chest pain, jaw pain, and R. arm paresthesias found to have worsening of aortic dissection    # Ascending aortic dissection (type A) -  Medical management, on Labetalol  BP is well contolled.    # Hypertension -   per cardio recs, labetalol 50mg q6h    # Gradual decrease in hemoglobin:  No active source of bleeding  will send stool for occult bleeding  will monitor    # Dementia  - c/w Memantine    # Dyslipidemia  - c/w Lipitor 80mg    Disposition: Will likely go home with VN   Full code status 84 y/o F with PMH of aortic aneurysm (diagnosed February 2017), DVT (diagnosed March, 2017) on eliquis, HLD, HTN, and dementia presented for acute onset of right chest pain, jaw pain, and R. arm paresthesias found to have worsening of aortic dissection    # Ascending aortic dissection (type A) -  Medical management, on Labetalol  BP is well contolled.    # Hypertension -   per cardio recs, labetalol 50mg q6h    # Gradual decrease in hemoglobin:  No active source of bleeding  will send stool for occult bleeding  will monitor    # Leukocytosis/ urine analysis s/o UTI:  will send for urine cx and start on Rocephin 1 gm q24    # [place] on CKD/ Hyperkalemia:  will do Bladder scan  Urine lytes sent    # Dementia  - c/w Memantine    # Dyslipidemia  - c/w Lipitor 80mg    Disposition: Will likely go home with VN   Full code status

## 2018-06-12 NOTE — PROGRESS NOTE ADULT - SUBJECTIVE AND OBJECTIVE BOX
Patient is a 85y old  Female who presents with a chief complaint of R. Chest pain, jaw pain, and R. arm paresthesias (2018 10:16)    Interval events:  sitting on chair, looks comfortable. Mentions feeling weak. no other complains      PAST MEDICAL & SURGICAL HISTORY:  Dyslipidemia  Dementia  Thoracic aortic dissection  History of deep vein thrombosis  Hypertension, unspecified type  Status post hip surgery  H/O rotator cuff tear  H/O partial thyroidectomy      MEDICATIONS  (STANDING):  atorvastatin 80 milliGRAM(s) Oral at bedtime  deplin 15 milliGRAM(s) 15 milliGRAM(s) Oral daily  folic acid 1 milliGRAM(s) Oral daily  furosemide    Tablet 20 milliGRAM(s) Oral <User Schedule>  labetalol 50 milliGRAM(s) Oral every 6 hours  lamoTRIgine 100 milliGRAM(s) Oral daily  memantine 5 milliGRAM(s) Oral daily  memantine 10 milliGRAM(s) Oral at bedtime    MEDICATIONS  (PRN):  acetaminophen   Tablet. 650 milliGRAM(s) Oral every 6 hours PRN Mild Pain (1 - 3)  ALPRAZolam 0.25 milliGRAM(s) Oral at bedtime PRN anxiety  morphine  - Injectable 2 milliGRAM(s) IV Push every 6 hours PRN Moderate Pain (4 - 6)        Vital Signs Last 24 Hrs  T(C): 36.7 (2018 12:55), Max: 36.7 (2018 12:55)  T(F): 98 (2018 12:55), Max: 98 (2018 12:55)  HR: 71 (2018 12:55) (58 - 71)  BP: 92/51 (2018 12:55) (90/55 - 122/59)  BP(mean): --  RR: 19 (2018 12:55) (18 - 92)  SpO2: 97% (2018 06:45) (97% - 97%)  CAPILLARY BLOOD GLUCOSE        I&O's Summary    2018 07:01  -  2018 07:00  --------------------------------------------------------  IN: 0 mL / OUT: 1 mL / NET: -1 mL        Physical Exam:    -     General : sitting on chair comfortably    -      Cardiac: Regular rate and rhythm    -      Pulm: bilateral clear    -      GI: soft non tender    -      Musculoskeletal: no edema    -      Neuro: ao x 2, non focal        Labs:                        7.5    12.03 )-----------( 288      ( 2018 07:43 )             24.3             06-12    134<L>  |  101  |  40<H>  ----------------------------<  115<H>  5.2<H>   |  18  |  1.7<H>    Ca    8.3<L>      2018 07:43                      Urinalysis Basic - ( 2018 15:05 )    Color: Dark Yellow / Appearance: Turbid / S.025 / pH: x  Gluc: x / Ketone: Negative  / Bili: Small / Urobili: 1.0 mg/dL   Blood: x / Protein: 30 mg/dL / Nitrite: Negative   Leuk Esterase: Large / RBC: x / WBC >50 /HPF   Sq Epi: x / Non Sq Epi: Many /HPF / Bacteria: Moderate /HPF          Imaging:    ECG:

## 2018-06-12 NOTE — PROGRESS NOTE ADULT - ASSESSMENT
Type A aortic dissection- remains hemodynamically stable. Not a candidate for surgery.  Hypertension still labile at times but was on Labetalol 50 mg q6H with parameters to hold for SBP <100 mm.  doses at 6 Am and 12 Pm were held.  Last /80 and pulse Ox 95%  [place] with Cr upto 1.7 in last 48 hours.  Hyponatremia.  Anemia - worsening ? GI Vs  bleed, dilutional effect.  Doubt aortic aneurysm leak  Deconditioning.    REC:  Discussed with  and daughters at bedside.  Encouraged  pt to move feet, legs and co-operate with PT when they comes.  Discussed option  Avoid hypotension - may be contributing to [place].  Patient was being followed by Dr. Hurt and will get input.  Incentive spirometer and teach pt on its use.  CXR upright in bed.  May benefit from transfusion if Hb<7.0  Discussed with house-[place] call Dr. Ariza

## 2018-06-12 NOTE — PROGRESS NOTE ADULT - ASSESSMENT
86 y/o F with PMH of aortic aneurysm      1) Ascending aortic dissection (type A)  -bp control  -discussed with house staff    2) Disposition  -pt eval, oob, chair, ambulate  - home    3) Full code status

## 2018-06-12 NOTE — PROGRESS NOTE ADULT - SUBJECTIVE AND OBJECTIVE BOX
seen/examined chart reviewed, clinically without change is out of bed,  at bedside, no sob, c/o feeling weak, ambulated a few steps yesterday    no jvd  rhonchi   rrr  soft nt nd + bs  no e/c/c

## 2018-06-13 LAB
ANION GAP SERPL CALC-SCNC: 16 MMOL/L — HIGH (ref 7–14)
BUN SERPL-MCNC: 45 MG/DL — HIGH (ref 10–20)
CALCIUM SERPL-MCNC: 8.4 MG/DL — LOW (ref 8.5–10.1)
CHLORIDE SERPL-SCNC: 99 MMOL/L — SIGNIFICANT CHANGE UP (ref 98–110)
CO2 SERPL-SCNC: 20 MMOL/L — SIGNIFICANT CHANGE UP (ref 17–32)
CREAT SERPL-MCNC: 1.7 MG/DL — HIGH (ref 0.7–1.5)
GLUCOSE SERPL-MCNC: 113 MG/DL — HIGH (ref 70–99)
HCT VFR BLD CALC: 26 % — LOW (ref 37–47)
HGB BLD-MCNC: 7.9 G/DL — LOW (ref 12–16)
MCHC RBC-ENTMCNC: 27.9 PG — SIGNIFICANT CHANGE UP (ref 27–31)
MCHC RBC-ENTMCNC: 30.4 G/DL — LOW (ref 32–37)
MCV RBC AUTO: 91.9 FL — SIGNIFICANT CHANGE UP (ref 81–99)
NRBC # BLD: 0 /100 WBCS — SIGNIFICANT CHANGE UP (ref 0–0)
PLATELET # BLD AUTO: 283 K/UL — SIGNIFICANT CHANGE UP (ref 130–400)
POTASSIUM SERPL-MCNC: 5.2 MMOL/L — HIGH (ref 3.5–5)
POTASSIUM SERPL-SCNC: 5.2 MMOL/L — HIGH (ref 3.5–5)
RBC # BLD: 2.83 M/UL — LOW (ref 4.2–5.4)
RBC # FLD: 14.9 % — HIGH (ref 11.5–14.5)
SODIUM SERPL-SCNC: 135 MMOL/L — SIGNIFICANT CHANGE UP (ref 135–146)
WBC # BLD: 11.04 K/UL — HIGH (ref 4.8–10.8)
WBC # FLD AUTO: 11.04 K/UL — HIGH (ref 4.8–10.8)

## 2018-06-13 RX ORDER — FUROSEMIDE 40 MG
20 TABLET ORAL DAILY
Qty: 0 | Refills: 0 | Status: DISCONTINUED | OUTPATIENT
Start: 2018-06-13 | End: 2018-06-22

## 2018-06-13 RX ORDER — FUROSEMIDE 40 MG
20 TABLET ORAL ONCE
Qty: 0 | Refills: 0 | Status: COMPLETED | OUTPATIENT
Start: 2018-06-13 | End: 2018-06-13

## 2018-06-13 RX ADMIN — MORPHINE SULFATE 2 MILLIGRAM(S): 50 CAPSULE, EXTENDED RELEASE ORAL at 15:48

## 2018-06-13 RX ADMIN — MORPHINE SULFATE 2 MILLIGRAM(S): 50 CAPSULE, EXTENDED RELEASE ORAL at 21:42

## 2018-06-13 RX ADMIN — Medication 50 MILLIGRAM(S): at 05:10

## 2018-06-13 RX ADMIN — Medication 50 MILLIGRAM(S): at 17:37

## 2018-06-13 RX ADMIN — MEMANTINE HYDROCHLORIDE 5 MILLIGRAM(S): 10 TABLET ORAL at 12:52

## 2018-06-13 RX ADMIN — LAMOTRIGINE 100 MILLIGRAM(S): 25 TABLET, ORALLY DISINTEGRATING ORAL at 12:52

## 2018-06-13 RX ADMIN — CEFTRIAXONE 100 GRAM(S): 500 INJECTION, POWDER, FOR SOLUTION INTRAMUSCULAR; INTRAVENOUS at 17:37

## 2018-06-13 RX ADMIN — Medication 20 MILLIGRAM(S): at 17:37

## 2018-06-13 RX ADMIN — ATORVASTATIN CALCIUM 80 MILLIGRAM(S): 80 TABLET, FILM COATED ORAL at 21:42

## 2018-06-13 RX ADMIN — Medication 50 MILLIGRAM(S): at 00:09

## 2018-06-13 RX ADMIN — Medication 20 MILLIGRAM(S): at 23:11

## 2018-06-13 RX ADMIN — Medication 50 MILLIGRAM(S): at 12:52

## 2018-06-13 RX ADMIN — Medication 650 MILLIGRAM(S): at 01:50

## 2018-06-13 RX ADMIN — Medication 650 MILLIGRAM(S): at 02:30

## 2018-06-13 RX ADMIN — Medication 1 MILLIGRAM(S): at 12:52

## 2018-06-13 RX ADMIN — MEMANTINE HYDROCHLORIDE 10 MILLIGRAM(S): 10 TABLET ORAL at 21:42

## 2018-06-13 RX ADMIN — Medication 50 MILLIGRAM(S): at 23:12

## 2018-06-13 RX ADMIN — MORPHINE SULFATE 2 MILLIGRAM(S): 50 CAPSULE, EXTENDED RELEASE ORAL at 16:20

## 2018-06-13 NOTE — BRIEF OPERATIVE NOTE - PROCEDURE
<<-----Click on this checkbox to enter Procedure Quadruple coronary bypass  06/13/2018  1. LIMA TO LAD  2-3: Ao TO OM1 TO OM2  4:    Ao TO RPDA  Active  LEI

## 2018-06-13 NOTE — CONSULT NOTE ADULT - SUBJECTIVE AND OBJECTIVE BOX
NEPHROLOGY CONSULTATION NOTE    Patient is a 85y Female with a past medical history of CKD stage 3, thoracic aortic aneurysm (dx 2017), DVT (3/17), paroxysmal A fib on Eliquis, HTN, HLD, dementia who presented with chest pain. Patient underwent CT Angiogram and was found to have worsening aortic aneurysm with ascending aortic dissection. After CT surgery and family discussion, patient was admitted to Coronary Care Unit for medical management only.     Patient currently denies fever, chills, nausea, vomiting, chest pain, palpitations, changes in bowel or urination. States currently she feels well and has no new complaints.   She had CTA on 18 with IVF prophylaxis before that. Seen for Almaz creat 1.2-1.7 mg%. C/o worsening SOB.     PAST MEDICAL & SURGICAL HISTORY:  Dyslipidemia  Dementia  Thoracic aortic dissection  History of deep vein thrombosis  Hypertension, unspecified type  Status post hip surgery  H/O rotator cuff tear  H/O partial thyroidectomy    Allergies:  NSAIDs (Hives; Urticaria)    Home Medications Reviewed  Hospital Medications:   MEDICATIONS  (STANDING):  atorvastatin 80 milliGRAM(s) Oral at bedtime  cefTRIAXone   IVPB 1 Gram(s) IV Intermittent <User Schedule>  deplin 15 milliGRAM(s) 15 milliGRAM(s) Oral daily  folic acid 1 milliGRAM(s) Oral daily  furosemide    Tablet 20 milliGRAM(s) Oral <User Schedule>  labetalol 50 milliGRAM(s) Oral every 6 hours  lamoTRIgine 100 milliGRAM(s) Oral daily  memantine 5 milliGRAM(s) Oral daily  memantine 10 milliGRAM(s) Oral at bedtime      SOCIAL HISTORY:  Denies ETOH,Smoking,   FAMILY HISTORY:  No pertinent family history in first degree relatives        REVIEW OF SYSTEMS:  All other review of systems is negative unless indicated above.    VITALS:  T(F): 96.9 (18 @ 05:00), Max: 99.2 (18 @ 20:16)  HR: 63 (18 @ 05:00)  BP: 134/60 (18 @ 05:00)  RR: 18 (18 @ 20:16)  SpO2: 97% (18 @ 07:59)     @ 07:01  -   @ 07:00  --------------------------------------------------------  IN: 0 mL / OUT: 75 mL / NET: -75 mL        I&O's Detail    2018 07:01  -  2018 07:00  --------------------------------------------------------  IN:  Total IN: 0 mL    OUT:    Voided: 75 mL  Total OUT: 75 mL    Total NET: -75 mL        PHYSICAL EXAM:  Constitutional: NAD  HEENT: anicteric sclera, oropharynx clear, MMM  Neck: No JVD  Respiratory: Left basilar crackles  Cardiovascular:  irregular rate and rhythm, diastolic murmur heard best in right second and left second intercostal space  Gastrointestinal: BS+, soft, NT/ND  Extremities: +1 pitting edema in b/l LE  Neurological: A/O x 2, not oriented to time, only to person and place  Psychiatric: Normal mood, normal affect  : No CVA tenderness. No chacko.   Skin: No rashes  Vascular Access: in place    LABS:      134<L>  |  101  |  40<H>  ----------------------------<  115<H>  5.2<H>   |  18  |  1.7<H>    Ca    8.3<L>      2018 07:43      Creatinine Trend: 1.7 <--, 1.4 <--, 1.2 <--, 1.2 <--, 1.1 <--, 1.2 <--, 1.5 <--, 1.5 <--, 1.3 <--, 1.2 <--, 1.2 <--                        7.5    12.03 )-----------( 288      ( 2018 07:43 )             24.3     Hemoglobin: 7.5 g/dL ( @ 07:43)  Hemoglobin: 7.8 g/dL ( @ 06:41)  Hemoglobin: 8.1 g/dL (06-10 @ 05:18)      Urine Studies:  Urinalysis Basic - ( 2018 15:05 )    Color: Dark Yellow / Appearance: Turbid / S.025 / pH:   Gluc:  / Ketone: Negative  / Bili: Small / Urobili: 1.0 mg/dL   Blood:  / Protein: 30 mg/dL / Nitrite: Negative   Leuk Esterase: Large / RBC:  / WBC >50 /HPF   Sq Epi:  / Non Sq Epi: Many /HPF / Bacteria: Moderate /HPF      Sodium, Random Urine: <20.0 mmoL/L ( @ 15:05)  Potassium, Random Urine: 68 mmol/L ( @ 15:05)      RADIOLOGY & ADDITIONAL STUDIES:  < from: CT Angio Chest Dissection Protocol (18 @ 13:42) >  Impression:  Ascending thoracic aortic dissection and aneurysm with intramural   hematoma, progressed significantly worsened from the prior study of   2017.  < end of copied text >      < from: Xray Chest 1 View- PORTABLE-Routine (18 @ 04:59) >  Impression:  Unchanged left pleural effusion and left retrocardiac opacity/atelectasis.  < end of copied text >    < from: Transthoracic Echocardiogram (18 @ 17:35) >  Summary:   1. Spectral Doppler shows impaired relaxation pattern of left   ventricular myocardial filling (Grade I diastolic dysfunction).   2. Sclerotic aortic valve with normal opening.   3. Dilatation of the aortic root.   4. AORTIC ROOT DILATED TO 3.9CM AND ASCENDING AORTA TO 4.9CM AND   POSSIBLE DISECTION FLAP NOTED.  < end of copied text >
Date of Admission:    CHIEF COMPLAINT:    HISTORY OF PRESENT ILLNESS:  84 y/o F with PMH of aortic aneurysm (diagnosed 2017), DVT (diagnosed ) on eliquis, HTN, HLD, and dementia presented for acute onset of right chest pain, jaw pain, and R. arm paresthesias. She states she woke up around 6am the day of presentation with the pain. It feels like a tightness in her chest and a numbness/tingling sensation traveling from her right shoulder down the entire arm to her fingers. Symptoms persisted since onset and are still present at the time of admission. Vascular surgery was consulted in the ED, who recommended a CTA to look for aortic dissection. The test came back positive for ascending thoracic aortic dissection and aneurysm with intramural hematoma significantly worsened from the prior study of 2017.  As per ED, discussion between CT surgery team and family ended in opting out of a surgical intervention so she was admitted to the Coronary Care Unit for medical management.  She still has right side chest pain.      PAST MEDICAL & SURGICAL HISTORY:  Dyslipidemia  Dementia  Thoracic aortic dissection  History of deep vein thrombosis  Hypertension, unspecified type  Status post hip surgery  H/O rotator cuff tear  H/O partial thyroidectomy    HEALTH ISSUES - PROBLEM Dx:  Dyslipidemia: Dyslipidemia  Dementia: Dementia  Hypertension, unspecified type: Hypertension, unspecified type  History of deep vein thrombosis: History of deep vein thrombosis  Ascending aortic dissection: Ascending aortic dissection    	  Home Medications:  atorvastatin 10 mg oral tablet: 1 tab(s) orally once a day (at bedtime) (2018 15:32)  Deplin 15 mg oral tablet: 1 tab(s) orally once a day (2018 15:32)  Eliquis 2.5 mg oral tablet: 1 tab(s) orally 2 times a day (2018 15:32)  lamoTRIgine 100 mg oral tablet, extended release: 1 tab(s) orally once a day (2018 15:32)  Lasix 20 mg oral tablet: 1 tab(s) orally 2 times a week (Mon / Thurs) (2018 15:32)  lisinopril 10 mg oral tablet: 1 tab(s) orally every 12 hours (2018 15:32)  Namenda 10 mg oral tablet: 1 tab(s) orally once a day (in the evening) (2018 15:32)  Namenda 5 mg oral tablet: 1 tab(s) orally once a day (in the morning) (2018 15:32)    MEDICATIONS  (STANDING):  atorvastatin 80 milliGRAM(s) Oral at bedtime  folic acid 1 milliGRAM(s) Oral daily  furosemide    Tablet 20 milliGRAM(s) Oral <User Schedule>  labetalol 100 milliGRAM(s) Oral three times a day  lamoTRIgine 100 milliGRAM(s) Oral daily  memantine 5 milliGRAM(s) Oral daily  memantine 10 milliGRAM(s) Oral at bedtime        PHYSICAL EXAM:  T(C): 36.8 (18 @ 08:28), Max: 36.8 (18 @ 08:28)  HR: 45 (18 @ 08:28) (44 - 72)  BP: 112/53 (18 @ 08:28) (105/54 - 154/65)  RR: 24 (18 @ 08:28) (18 - 34)  SpO2: 98% (18 @ 08:28) (93% - 99%)  Wt(kg): --  I&O's Summary    2018 07:01  -  2018 07:00  --------------------------------------------------------  IN: 255 mL / OUT: 100 mL / NET: 155 mL      Daily Height in cm: 152.4 (2018 19:45)    Daily Weight in k.3 (2018 06:00)    General Appearance: Normal	  Cardiovascular: Normal S1 S2, No JVD, No murmurs, No edema  Respiratory: Lungs clear to auscultation	  Psychiatry: A & O x 3, Mood & affect appropriate  Gastrointestinal:  Soft, Non-tender  Skin: No rashes, No ecchymoses, No cyanosis	  Neurologic: Non-focal  Extremities: Normal range of motion, No clubbing, cyanosis or edema  Vascular: Peripheral pulses palpable 2+ bilaterally        LABS:	 	                          8.9    10.07 )-----------( 205      ( 2018 04:45 )             28.0     06-    141  |  106  |  30<H>  ----------------------------<  126<H>  4.3   |  18  |  1.2    Ca    8.3<L>      2018 04:45  Mg     1.7     06-    TPro  6.6  /  Alb  4.0  /  TBili  0.3  /  DBili  x   /  AST  21  /  ALT  11  /  AlkPhos  78  06    CARDIAC MARKERS ( 2018 04:45 )  x     / 0.17 ng/mL / 41 U/L / x     / 3.7 ng/mL  CARDIAC MARKERS ( 2018 21:07 )  x     / 0.12 ng/mL / 36 U/L / x     / 4.2 ng/mL  CARDIAC MARKERS ( 2018 09:12 )  x     / <0.01 ng/mL / 41 U/L / x     / x          PT/INR - ( 2018 09:12 )   PT: 15.50 sec;   INR: 1.42 ratio         PTT - ( 2018 09:12 )  PTT:27.5 sec    proBNP: Serum Pro-Brain Natriuretic Peptide: 616 pg/mL ( @ 09:12)      TELEMETRY EVENTS: Sinus bradycardia
HPI:  84 y/o F with PMH of aortic aneurysm (diagnosed February 2017), DVT (diagnosed March, 2017) on eliquis, HTN, HLD, and dementia presented for acute onset of right chest pain, jaw pain, and R. arm paresthesias. She states she woke up around 6am the day of presentation with the pain. It feels like a tightness in her chest and a numbness/tingling sensation traveling from her right shoulder down the entire arm to her fingers. Symptoms persisted since onset and are still present at the time of admission. Vascular surgery was consulted in the ED, who recommended a CTA to look for aortic dissection. The test came back positive for ascending thoracic aortic dissection and aneurysm with intramural hematoma significantly worsened from the prior study of February 2017.  As per ED, discussion between CT surgery team and family ended in opting out of a surgical intervention so she was admitted to the Coronary Care Unit for medical management. (01 Jun 2018 15:15)      PAST MEDICAL & SURGICAL HISTORY:  Dyslipidemia  Dementia  Thoracic aortic dissection  History of deep vein thrombosis  Hypertension, unspecified type  Status post hip surgery  H/O rotator cuff tear  H/O partial thyroidectomy      Hospital Course:  She has ascending thorasic aortic anneurysm. She is a very high risk patient with and without surgery. Her BP is controlled nicely. She has been hospitalized since 6/1. At baseline she walks with a walker with close supervisison.  TODAY'S SUBJECTIVE & REVIEW OF SYMPTOMS:     Constitutional WNL   Cardio WNL   Resp WNL   GI WNL  Heme WNL  Endo WNL  Skin WNL  MSK WNL  Neuro WNL  Cognitive WNL  Psych WNL      MEDICATIONS  (STANDING):  atorvastatin 80 milliGRAM(s) Oral at bedtime  deplin 15 milliGRAM(s) 15 milliGRAM(s) Oral daily  folic acid 1 milliGRAM(s) Oral daily  furosemide    Tablet 20 milliGRAM(s) Oral <User Schedule>  labetalol 50 milliGRAM(s) Oral every 6 hours  lamoTRIgine 100 milliGRAM(s) Oral daily  memantine 5 milliGRAM(s) Oral daily  memantine 10 milliGRAM(s) Oral at bedtime    MEDICATIONS  (PRN):  acetaminophen   Tablet. 650 milliGRAM(s) Oral every 6 hours PRN Mild Pain (1 - 3)  ALPRAZolam 0.25 milliGRAM(s) Oral at bedtime PRN anxiety      FAMILY HISTORY:  No pertinent family history in first degree relatives      Allergies    NSAIDs (Hives; Urticaria)    Intolerances        SOCIAL HISTORY:    [  ] Etoh  [  ] Smoking  [  ] Substance abuse     Home Environment:  [  ] Home Alone  [ x ] Lives with Family  [  ] Home Health Aid    Dwelling:  [  ] Apartment  [  ] Private House  [  ] Adult Home  [  ] Skilled Nursing Facility      [  ] Short Term  [  ] Long Term  [  ] Stairs       Elevator [  ]    FUNCTIONAL STATUS PTA: (Check all that apply)  Ambulation: [   ]Independent    [  ] Dependent     [  ] Non-Ambulatory  Assistive Device: [  ] SA Cane  [  ]  Q Cane  [x  ] Walker  with supervision close  [  ]  Wheelchair  ADL : [  ] Independent  [  ]  Dependent       Vital Signs Last 24 Hrs  T(C): 36.9 (11 Jun 2018 12:06), Max: 38 (10 Jeferson 2018 22:12)  T(F): 98.4 (11 Jun 2018 12:06), Max: 100.4 (10 Jeferson 2018 22:12)  HR: 65 (11 Jun 2018 12:06) (65 - 79)  BP: 135/- (11 Jun 2018 12:06) (105/51 - 135/-)  BP(mean): 78 (10 Jeferson 2018 20:00) (78 - 80)  RR: 18 (11 Jun 2018 05:00) (18 - 27)  SpO2: 92% (10 Jeferson 2018 20:00) (92% - 95%)      PHYSICAL EXAM: Alert & Oriented X3  GENERAL: NAD, well-groomed, well-developed  HEAD:  Atraumatic, Normocephalic  EYES: EOMI, PERRLA, conjunctiva and sclera clear  NECK: Supple, No JVD, Normal thyroid  CHEST/LUNG: Clear to percussion bilaterally; No rales, rhonchi, wheezing, or rubs  HEART: Regular rate and rhythm; No murmurs, rubs, or gallops  ABDOMEN: Soft, Nontender, Nondistended; Bowel sounds present  EXTREMITIES:  2+ Peripheral Pulses, No clubbing, cyanosis, or edema    NERVOUS SYSTEM:  Cranial Nerves 2-12 intact [  ] Abnormal  [  ]  ROM: WFL all extremities [  ]  Abnormal [  ]  Motor Strength: WFL all extremities  [  ]  Abnormal [x  ] 4+/5 LE's  Sensation: intact to light touch [  ] Abnormal [  ]  Reflexes: Symmetric [  ]  Abnormal [  ]    FUNCTIONAL STATUS:  Bed Mobility: Independent [  ]  Supervision [  ]  Needs Assistance [  ]  N/A [  ]  Transfers: Independent [  ]  Supervision [  ]  Needs Assistance [  ]  N/A [  ]   Ambulation: Independent [  ]  Supervision [  ]  Needs Assistance [  ]  N/A [  ]  ADL: Independent [  ] Requires Assistance [  ] N/A [  ]      LABS:                        7.8    11.71 )-----------( 279      ( 11 Jun 2018 06:41 )             25.6     06-11    132<L>  |  100  |  29<H>  ----------------------------<  109<H>  4.6   |  20  |  1.4    Ca    8.4<L>      11 Jun 2018 06:41            RADIOLOGY & ADDITIONAL STUDIES:    Assesment:
NEPHROLOGY CONSULTATION NOTE    Patient is a 85y Female with a past medical history of CKD stage 3, thoracic aortic aneurysm (dx 2017), DVT (3/17), paroxysmal A fib on Eliquis, HTN, HLD, dementia who presented with chest pain. Patient underwent CT Angiogram and was found to have worsening aortic aneurysm with ascending aortic dissection. After CT surgery and family discussion, patient was admitted to Coronary Care Unit for medical management only.     Patient currently denies fever, chills, nausea, vomiting, chest pain, palpitations, changes in bowel or urination. States currently she feels well and has no new complaints.       PAST MEDICAL & SURGICAL HISTORY:  Dyslipidemia  Dementia  Thoracic aortic dissection  History of deep vein thrombosis  Hypertension, unspecified type  Status post hip surgery  H/O rotator cuff tear  H/O partial thyroidectomy    Allergies:  NSAIDs (Hives; Urticaria)    Home Medications Reviewed  Hospital Medications:   MEDICATIONS  (STANDING):  atorvastatin 80 milliGRAM(s) Oral at bedtime  cefTRIAXone   IVPB 1 Gram(s) IV Intermittent <User Schedule>  deplin 15 milliGRAM(s) 15 milliGRAM(s) Oral daily  folic acid 1 milliGRAM(s) Oral daily  furosemide    Tablet 20 milliGRAM(s) Oral <User Schedule>  labetalol 50 milliGRAM(s) Oral every 6 hours  lamoTRIgine 100 milliGRAM(s) Oral daily  memantine 5 milliGRAM(s) Oral daily  memantine 10 milliGRAM(s) Oral at bedtime      SOCIAL HISTORY:  Denies ETOH,Smoking,   FAMILY HISTORY:  No pertinent family history in first degree relatives        REVIEW OF SYSTEMS:  All other review of systems is negative unless indicated above.    VITALS:  T(F): 96.9 (18 @ 05:00), Max: 99.2 (18 @ 20:16)  HR: 63 (18 @ 05:00)  BP: 134/60 (18 @ 05:00)  RR: 18 (18 @ 20:16)  SpO2: 97% (18 @ 07:59)     @ 07:01  -   @ 07:00  --------------------------------------------------------  IN: 0 mL / OUT: 75 mL / NET: -75 mL        I&O's Detail    2018 07:01  -  2018 07:00  --------------------------------------------------------  IN:  Total IN: 0 mL    OUT:    Voided: 75 mL  Total OUT: 75 mL    Total NET: -75 mL        PHYSICAL EXAM:  Constitutional: NAD  HEENT: anicteric sclera, oropharynx clear, MMM  Neck: No JVD  Respiratory: CTAB, no wheezes, rales or rhonchi  Cardiovascular:  irregular rate and rhythm, diastolic murmur heard best in right second and left second intercostal space  Gastrointestinal: BS+, soft, NT/ND  Extremities: +1 pitting edema in b/l LE  Neurological: A/O x 2, not oriented to time, only to person and place  Psychiatric: Normal mood, normal affect  : No CVA tenderness. No chacko.   Skin: No rashes  Vascular Access: in place    LABS:      134<L>  |  101  |  40<H>  ----------------------------<  115<H>  5.2<H>   |  18  |  1.7<H>    Ca    8.3<L>      2018 07:43      Creatinine Trend: 1.7 <--, 1.4 <--, 1.2 <--, 1.2 <--, 1.1 <--, 1.2 <--, 1.5 <--, 1.5 <--, 1.3 <--, 1.2 <--, 1.2 <--                        7.5    12.03 )-----------( 288      ( 2018 07:43 )             24.3     Hemoglobin: 7.5 g/dL ( @ 07:43)  Hemoglobin: 7.8 g/dL ( @ 06:41)  Hemoglobin: 8.1 g/dL (06-10 @ 05:18)      Urine Studies:  Urinalysis Basic - ( 2018 15:05 )    Color: Dark Yellow / Appearance: Turbid / S.025 / pH:   Gluc:  / Ketone: Negative  / Bili: Small / Urobili: 1.0 mg/dL   Blood:  / Protein: 30 mg/dL / Nitrite: Negative   Leuk Esterase: Large / RBC:  / WBC >50 /HPF   Sq Epi:  / Non Sq Epi: Many /HPF / Bacteria: Moderate /HPF      Sodium, Random Urine: <20.0 mmoL/L ( @ 15:05)  Potassium, Random Urine: 68 mmol/L ( @ 15:05)      RADIOLOGY & ADDITIONAL STUDIES:  < from: CT Angio Chest Dissection Protocol (18 @ 13:42) >  Impression:  Ascending thoracic aortic dissection and aneurysm with intramural   hematoma, progressed significantly worsened from the prior study of   2017.  < end of copied text >      < from: Xray Chest 1 View- PORTABLE-Routine (18 @ 04:59) >  Impression:  Unchanged left pleural effusion and left retrocardiac opacity/atelectasis.  < end of copied text >    < from: Transthoracic Echocardiogram (18 @ 17:35) >  Summary:   1. Spectral Doppler shows impaired relaxation pattern of left   ventricular myocardial filling (Grade I diastolic dysfunction).   2. Sclerotic aortic valve with normal opening.   3. Dilatation of the aortic root.   4. AORTIC ROOT DILATED TO 3.9CM AND ASCENDING AORTA TO 4.9CM AND   POSSIBLE DISECTION FLAP NOTED.  < end of copied text >
Surgeon: /Nestor/ Sandy    Consult requesting by: Dr. Robles    HISTORY OF PRESENT ILLNESS:  85y Female with pmhx of Aortic dissection, DVT on eliquis, HTN, CAD, and CKD, presents with right sided CP since this morning. Pain radiating down arm. No SOB, cough, fever, chills, calf pain or swelling. no abd pain. Patient found to have a type A aortic dissection seen on CTA.     NYHA functional class    [ ] Class I (no limitation) [ x] Class II (slight limitation) [ ] Class III (marked limitation) [ ] Class IV (symptoms at rest)    PAST MEDICAL & SURGICAL HISTORY:  Dissecting aneurysm of anterior cerebral artery  Hypertension, unspecified type  DVT  CKD  Right hip ORIF  Hysterectomy    Home Medications:  atorvastatin 10 mg oral tablet: 1 tab(s) orally once a day (at bedtime) (01 Jun 2018 15:27)  Eliquis 2.5 mg oral tablet: 1 tab(s) orally 2 times a day (01 Jun 2018 09:23)  lamoTRIgine 100 mg oral tablet, extended release: 1 tab(s) orally once a day (01 Jun 2018 15:28)  Lasix 20 mg oral tablet: 1 tab(s) orally once a day (01 Jun 2018 09:23)  lisinopril 10 mg oral tablet: 1 tab(s) orally once a day (01 Jun 2018 09:23)  Namenda 5 mg oral tablet: 1 tab(s) orally 2 times a day (01 Jun 2018 09:23)    MEDICATIONS  (PRN):    Antiplatelet therapy:                           Last dose/amt:    Allergies    NSAIDs (Hives; Urticaria)    Intolerances    SOCIAL HISTORY:  Smoker: [ ] Yes  [x ] No        PACK YEARS:                         WHEN QUIT?  ETOH use: [ ] Yes  [x ] No              FREQUENCY / QUANTITY:  Ilicit Drug use:  [ ] Yes  [x ] No  Lives with:   Assisted device use: rolling walker    FAMILY HISTORY:      Review of Systems  CONSTITUTIONAL:  Fevers[ ] chills[ ] sweats[ ] fatigue[ ] weight loss[ ] weight gain [ ]                                     NEGATIVE [X ]   NEURO:  parathesias[ ] seizures [ ]  syncope [ ]  confusion [ ]                                                                                NEGATIVE[ X]   EYES: glasses[ ]  blurry vision[ ]  discharge[ ] pain[ ] glaucoma [ ]                                                                          NEGATIVE[X ]   ENMT:  difficulty hearing [ ]  vertigo[ ]  dysphagia[ ] epistaxis[ ] recent dental work [ ]                                    NEGATIVE[ X]   CV:  chest pain[x ] palpitations[ ] CASTILLO [ ] diaphoresis [ ]                                                                                           NEGATIVE[ X]   RESPIRATORY:  wheezing[ ] SOB[ ] cough [ ] sputum[ ] hemoptysis[ ]                                                                  NEGATIVE[ ]   GI:  nausea[ ]  vommiting [ ]  diarrhea[ ] constipation [ ] melena [ ]                                                                      NEGATIVE[ X]   : hematuria[ ]  dysuria[ ] urgency[ ] incontinence[ ]                                                                                            NEGATIVE[ X]   MUSKULOSKELETAL:  arthritis[ ]  joint swelling [ ] muscle weakness [ ] Hx vein stripping [ ]                             NEGATIVE[X ]   SKIN/BREAST:  rash[ ] itching [ ]  hair loss[ ] masses[ ]                                                                                              NEGATIVE[ X]   PSYCH:  dementia [ ] depresion [ ] anxiety[ ]                                                                                                               NEGATIVE[X ]   HEME/LYMPH:  bruises easily[ ] enlarged lymph nodes[ ] tender lymph nodes[ ]                                               NEGATIVE[ X]   ENDOCRINE:  cold intolerance[ ] heat intolerance[ ] polydipsia[ ]                                                                          NEGATIVE[ X]     PHYSICAL EXAM  Vital Signs Last 24 Hrs  T(C): 36.6 (01 Jun 2018 14:54), Max: 36.6 (01 Jun 2018 14:54)  T(F): 97.8 (01 Jun 2018 14:54), Max: 97.8 (01 Jun 2018 14:54)  HR: 72 (01 Jun 2018 15:14) (66 - 76)  BP: 116/70 (01 Jun 2018 15:14) (105/54 - 170/87)  RR: 18 (01 Jun 2018 15:14) (18 - 18)  SpO2: 98% (01 Jun 2018 15:14) (97% - 99%)    CONSTITUTIONAL:                                                                          WNL[x ]   Neuro: WNL[x] Normal exam oriented to person/place & time with no focal motor or sensory  deficits. Other                     Eyes: WNL[x]   Normal exam of conjunctiva & lids, pupils equally reactive. Other     ENT: WNL[x]    Normal exam of nasal/oral mucosa with absence of cyanosis. Other  Neck: WNL[x]  Normal exam of jugular veins, trachea & thyroid. Other  Chest: WNL[x ] Normal lung exam with good air movement absence of wheezes, rales, or rhonchi: Other                                                                                CV:  Auscultation: normal [x] S3[ ] S4[ ] Irregular [ ] Rub[ ] Clicks[ ]    Murmurs none:[x ]systolic [ ]  diastolic [ ] holosystolic [ ]  Carotids: No Bruits[x] Other                 Abdominal Aorta: normal [ ] nonpalpable[ ]Other                                                                                      GI:           WNL[x] Normal exam of abdomen, liver & spleen with no noted masses or tenderness. Other                                                                                                        Extremities: WNL[x ] Normal no evidence of cyanosis or deformity Edema: none[ ]trace[ ]1+[ ]2+[ ]3+[ ]4+[ ]  Lower Extremity Pulses: Right[1+] Left[1+ ]Varicosities[ ]  SKIN :WNL[x] Normal exam to inspection & palation. Other:                                                          LABS:                        11.3   9.35  )-----------( 259      ( 01 Jun 2018 09:12 )             35.8     06-01    139  |  100  |  36<H>  ----------------------------<  110<H>  5.2<H>   |  20  |  1.2    Ca    9.0      01 Jun 2018 09:12  Mg     1.9     06-01    TPro  6.6  /  Alb  4.0  /  TBili  0.3  /  DBili  x   /  AST  21  /  ALT  11  /  AlkPhos  78  06-01    PT/INR - ( 01 Jun 2018 09:12 )   PT: 15.50 sec;   INR: 1.42 ratio         PTT - ( 01 Jun 2018 09:12 )  PTT:27.5 sec    CARDIAC MARKERS ( 01 Jun 2018 09:12 )  x     / <0.01 ng/mL / 41 U/L / x     / x            < from: CT Angio Chest Dissection Protocol (06.01.18 @ 13:42) >  INTERPRETATION:  Clinical History / Reason for exam: Dissection.    CT angiogram was performed from the apices down to the aortic bifurcation   before and after the administration of intravenous contrast. 100 cc of   low osmolar nonionic contrast was utilized. Coronal and sagittal rendered   images were obtained. Comparison is made with a study dated 3/4/2017.    Noncontrast studies demonstrates presence of intramuralhematoma within   the ascending aorta as well as the proximal descending aorta.   Postcontrast reveals expansion of the previously seen descending aortic   dissection to this time to an ascending aortic dissection and hematoma,   therefore type a. The size of the ascending aorta including the   dissection and hematoma measures 5.4 cm x 5.2 cm. This hematoma extends   down to the aortic root near the sinus of Valsalva. There is   atherosclerosis of the coronary vasculature. The descending thoracic  aorta itself reveals atherosclerotic change without extension of the   dissection flap past the proximal descending aorta. At the diaphragmatic   hiatus, the aorta measures 2.5 cm. There is atherosclerotic change seen.   There is atherosclerotic change of the branching vasculature.     The heart size itself is normal. There is no pericardial effusion.    The tracheobronchial tree is patent. There is no change in the appearance   of the thyroid gland.    Fibrotic change at the lung bases is seen.    Evaluation of the upper abdomen reveals unchanged appearance of the   kidneys, liver, spleen, and gallbladder. The pancreas is within normal   limits.    There are degenerative changes of the spine.    Impression:    Ascending thoracic aortic dissection and aneurysm with intramural   hematoma, progressed significantly worsened from the prior study of   February 2017.    < end of copied text >        Impression:    CAD [ ]  Valvular  disease [ ]   Aortic Disease [x ] Type A Aortic Dissection  [place]: Yes[ ] No [ ]   CKD Stage I [ ] , Stage II [ ] , Stage III [ ], Stage IV [ ]   Anemia: Yes [ ], No [ ]  Diabetes :Yes [ ], No [ ]  Acute MI: Yes [ ], No [ ]   Heart Failure: Yes [ ] , No [ ] HFpEF [ ], HFrEF [ ]        Assessment/ Plan:  85 year-old female with hx Type B Aortic dissection came to ED with right chest pain and found to have Type A Aortic Dissection with intramural hematoma. Due to patients age and baseline activity level, pt and family do not wish to proceed with surgery at this time.   Palliative care consult for comfort measures  Continue medical therapy and supportive care   Tight BP control
VASCULAR SURGERY CONSULT NOTE    HPI:  This is an 86 yo female with significant PMHx for DVTs (on Eliquis), type B aortic dissection diagnosed 2/2017, as well as ascending aortic aneurysm 4.4 cm (unchanged from 2012), HTN, CAD, CKD GFR 32 (which prevented the pt to have a follow up CTA in past "unless necessary") who presented today with c/o right sided chest pain and tingling of the right arm/hand which started 6 AM today. On presentation to ED /78, pt received Lisinopril dose with SBP improving to 118. Cardiology evaluated and wants to treat pt conservatively. Upon evaluation, pt still c/o chest pain and right arm tingling.      PAST MEDICAL & SURGICAL HISTORY:  Dissecting aneurysm of anterior cerebral artery  Hypertension, unspecified type    NSAIDs (Hives; Urticaria)    Home Medications:  atorvastatin 40 mg oral tablet: 1 tab(s) orally once a day (01 Jun 2018 09:23)  Eliquis 2.5 mg oral tablet: 1 tab(s) orally 2 times a day (01 Jun 2018 09:23)  lamoTRIgine 5 mg oral tablet, chewable dispersible: 1 tab(s) orally 2 times a day (01 Jun 2018 09:23)  Lasix 20 mg oral tablet: 1 tab(s) orally once a day (01 Jun 2018 09:23)  lisinopril 10 mg oral tablet: 1 tab(s) orally once a day (01 Jun 2018 09:23)  Namenda 5 mg oral tablet: 1 tab(s) orally 2 times a day (01 Jun 2018 09:23)      REVIEW OF SYSTEMS:  GENERAL:                                         negative  SKIN:                                                 negative  OPTHALMOLOGIC:                          negative  ENMT:                                               negative  RESPIRATORY AND THORAX:        negative  CARDIOVASCULAR:                         see HPI  GASTROINTESTINAL:                       negative  MUSCULOSKELETAL:                       negative  NEUROLOGIC:                                   negative  PSYCHIATRIC:                                    negative  HEMATOLOGY/LYMPHATICS:         negative  ENDOCRINE:                                     negative  ALLERGIC/IMMUNOLOGIC:            negative      PHYSICAL EXAM  Vital Signs Last 24 Hrs  T(C): 36.4 (01 Jun 2018 08:20), Max: 36.4 (01 Jun 2018 08:20)  T(F): 97.5 (01 Jun 2018 08:20), Max: 97.5 (01 Jun 2018 08:20)  HR: 70 (01 Jun 2018 12:08) (67 - 76)  BP: 118/60 (01 Jun 2018 12:08) (108/58 - 170/87)  BP(mean): --  RR: 18 (01 Jun 2018 08:20) (18 - 18)  SpO2: 97% (01 Jun 2018 08:20) (97% - 97%)    Appearance: Normal	  HEENT:   Normal oral mucosa, PERRL, EOMI	  Neck: Supple, - JVD; Carotid Bruit   Cardiovascular: Normal S1 S2, No JVD, No murmurs,   Respiratory: Lungs clear to auscultation, No Rales, Rhonchi, Wheezing	  Gastrointestinal:  Soft, Non-tender, + BS	  Skin: No rashes, No ecchymoses, No cyanosis  Extremities: Normal range of motion, No clubbing, cyanosis or edema   Right upper extremity: warm, radial pulse is palpable, hand blenching is noted  Vascular: Peripheral pulses palpable 2+ bilaterally  Neurologic: Non-focal  Psychiatry: A & O x 3, Mood & affect appropriate      PULSES:    Radial: palpable and equal bilaterally  Dorsal Pedal: palpable bilaterally and equal  Posterior Tibial: palpable bilaterally and equal      MEDICATIONS:   MEDICATIONS  (STANDING):    MEDICATIONS  (PRN):      LAB/STUDIES:                        11.3   9.35  )-----------( 259      ( 01 Jun 2018 09:12 )             35.8     06-01    139  |  100  |  36<H>  ----------------------------<  110<H>  5.2<H>   |  20  |  1.2    Ca    9.0      01 Jun 2018 09:12  Mg     1.9     06-01    TPro  6.6  /  Alb  4.0  /  TBili  0.3  /  DBili  x   /  AST  21  /  ALT  11  /  AlkPhos  78  06-01    PT/INR - ( 01 Jun 2018 09:12 )   PT: 15.50 sec;   INR: 1.42 ratio         PTT - ( 01 Jun 2018 09:12 )  PTT:27.5 sec  LIVER FUNCTIONS - ( 01 Jun 2018 09:12 )  Alb: 4.0 g/dL / Pro: 6.6 g/dL / ALK PHOS: 78 U/L / ALT: 11 U/L / AST: 21 U/L / GGT: x               CARDIAC MARKERS ( 01 Jun 2018 09:12 )  x     / <0.01 ng/mL / 41 U/L / x     / x          IMAGING:  CTA 2/2017: Acute type B aortic dissection of the proximal descending aorta without   evidence of extension into the aortic arch or its branches. The greatest   length of the false lumen measures 8 cm, seen on sagittal view (series [id number] image   109).   Stable approximately 4.4 cm ascending aorta aneurysm since 8/7/2012.     Right ventricular and left atrium dilation.

## 2018-06-13 NOTE — CONSULT NOTE ADULT - ASSESSMENT
Patient is a 85 year old female with pmh of CKD stage 3, thoracic aortic aneurysm, a fib, dvt, htn, hld, dementia who presented with chest pain    ·	[place] on CKD, most likely secondary to cardiorenal syndrome / mild hyperkalemia   ·	on labetalol for bp control, bp dropped to 90/55 followed by rise in creatinine  ·	urine lytes thus far consistent with prerenal picture   ·	check serum osmolality, urine osmolality, urine creatinine   ·	monitor bp, keep MAP >65  ·	sono pending  ·	Anemia   ·	keep Hg >7, transfuse as needed    Please see attending consult note Patient is a 85 year old female with pmh of CKD stage 3, thoracic aortic aneurysm, a fib, dvt, htn, hld, dementia who presented with chest pain    ·	[place] on CKD, most likely secondary to cardiorenal syndrome / mild hyperkalemia   ·	on labetalol for bp control, bp dropped to 90/55 followed by rise in creatinine  ·	monitor bp, keep MAP >65  ·	urine lytes thus far consistent with prerenal picture   ·	check serum osmolality, urine osmolality, urine creatinine   ·	sono bladder pending  ·	monitor repeat BMP  ·	no acute indication for RRT  ·	Anemia   ·	keep Hg >7, transfuse as needed    Please see attending consult note Patient is a 85 year old female with pmh of CKD stage 3, thoracic aortic aneurysm, a fib, dvt, htn, hld, dementia who presented with chest pain    ·	[place] on CKD, most likely secondary to cardiorenal syndrome / mild hyperkalemia   ·	on labetalol for bp control, bp dropped to 90/55 followed by rise in creatinine  ·	monitor bp, keep MAP >65  ·	urine lytes thus far consistent with prerenal picture   ·	check serum osmolality, urine osmolality, urine creatinine   ·	sono bladder pending  ·	monitor repeat BMP  ·	no acute indication for RRT  ·	UTI  ·	c/w antibiotics  ·	Anemia   ·	keep Hg >7, transfuse as needed    Please see attending consult note Patient is a 85 year old female with pmh of CKD stage 3, thoracic aortic aneurysm, a fib, dvt, htn, hld, dementia who presented with chest pain    ·	[place] on CKD, most likely secondary to cardiorenal syndrome / mild hyperkalemia   ·	on labetalol for bp control, bp dropped to 90/55 followed by rise in creatinine  ·	monitor bp, keep MAP >65  ·	urine lytes thus far consistent with prerenal picture   ·	check serum osmolality, urine osmolality, urine creatinine   ·	sono bladder pending  ·	recommend hold off diuretics  ·	monitor repeat BMP  ·	no acute indication for RRT  ·	UTI  ·	c/w antibiotics  ·	Anemia   ·	keep Hg >7, transfuse as needed    Please see attending consult note

## 2018-06-13 NOTE — CONSULT NOTE ADULT - CONSULT REQUESTED DATE/TIME
01-Jun-2018 13:08
01-Jun-2018 15:26
02-Jun-2018
11-Jun-2018 09:59
13-Jun-2018 10:15
13-Jun-2018 12:00

## 2018-06-13 NOTE — PROGRESS NOTE ADULT - SUBJECTIVE AND OBJECTIVE BOX
seen/examined chart reviewed, clinically without change is out of bed, daughter at bedside, no sob, c/o feeling weak, ambulated a few steps yesterday    no jvd  rhonchi   rrr  soft nt nd + bs  no e/c/c

## 2018-06-13 NOTE — PROGRESS NOTE ADULT - ASSESSMENT
Assessment and Plan:   · Assessment		    84 y/o F with PMH of aortic aneurysm      1) Ascending aortic dissection (type A)  -bp control  -discussed with cardio     2) anemia- unclear etiology  -check stool guaiac  - given rate of hg drop, would consider transfusing 1-2 units of packed red blood cells  -start iron qd    3)disposition  -pt eval, oob, chair, ambulate  - family wants to take pt home    4) Full code status, discussed DNR to family

## 2018-06-13 NOTE — BRIEF OPERATIVE NOTE - POST-OP DX
Angina pectoris  06/13/2018    Active  Terrell Delgado  Triple vessel coronary artery disease  06/13/2018    Active  Terrell Delgado

## 2018-06-13 NOTE — PROGRESS NOTE ADULT - ASSESSMENT
86 y/o F with PMH of aortic aneurysm (diagnosed February 2017), DVT (diagnosed March, 2017) on eliquis, HLD, HTN, and dementia presented for acute onset of right chest pain, jaw pain, and R. arm paresthesias found to have worsening of aortic dissection    # Ascending aortic dissection (type A) -  Medical management, on Labetalol  BP is well contolled now  will hold for SBP < 100    # Hypertension -   per cardio recs, labetalol 50mg q6h    # Gradual decrease in hemoglobin/ Normocytic anemia:  Hemoglobin improved to 7.9 today  No active source of bleeding  NO bowel movement for 3 days, will send for stool guiaic if she have a BM    # Leukocytosis/ urine analysis s/o UTI:  c/w Rocephine, f/u Urine cx    # [place] on CKD/ Hyperkalemia:  Bladder scan done post voidal showed no residual urine  Urine lytes likely Prerenal vs cardiorenal  serum osmolarity, urine osmolarity and creatinine pending    #Diastolic heart failure/ Congested CXR:  Saturating 94/95 on 2 liters NC  will give her 20 mg iv lasix today      # Dementia  - c/w Memantine    # Dyslipidemia  - c/w Lipitor 80mg    Disposition: Will likely go home with VN   Full code status

## 2018-06-13 NOTE — PROGRESS NOTE ADULT - SUBJECTIVE AND OBJECTIVE BOX
SUBJ:  Patient seen and examined.  C/O off and on bilateral chest pain and fatigue.    MEDICATIONS  (STANDING):  atorvastatin 80 milliGRAM(s) Oral at bedtime  cefTRIAXone   IVPB 1 Gram(s) IV Intermittent <User Schedule>  deplin 15 milliGRAM(s) 15 milliGRAM(s) Oral daily  folic acid 1 milliGRAM(s) Oral daily  furosemide    Tablet 20 milliGRAM(s) Oral <User Schedule>  labetalol 50 milliGRAM(s) Oral every 6 hours  lamoTRIgine 100 milliGRAM(s) Oral daily  memantine 5 milliGRAM(s) Oral daily  memantine 10 milliGRAM(s) Oral at bedtime    MEDICATIONS  (PRN):  acetaminophen   Tablet. 650 milliGRAM(s) Oral every 6 hours PRN Mild Pain (1 - 3)  ALPRAZolam 0.25 milliGRAM(s) Oral at bedtime PRN anxiety  morphine  - Injectable 2 milliGRAM(s) IV Push every 6 hours PRN Moderate Pain (4 - 6)            Vital Signs Last 24 Hrs  T(C): 36.3 (13 Jun 2018 16:42), Max: 37.3 (12 Jun 2018 20:16)  T(F): 97.4 (13 Jun 2018 16:42), Max: 99.2 (12 Jun 2018 20:16)  HR: 63 (13 Jun 2018 16:42) (63 - 78)  BP: 136/60 (13 Jun 2018 16:42) (116/56 - 136/60)  BP(mean): --  RR: 18 (13 Jun 2018 16:42) (18 - 18)  SpO2: 94% (13 Jun 2018 12:50) (94% - 97%)     REVIEW OF SYSTEMS:  CONSTITUTIONAL: No fever, weight loss, or fatigue  CARDIOLOGY: Patient denies shortness of breath , mild leg edema.   RESPIRATORY: denies shortness of breath, wheezing.   NEUROLOGICAL: NO weakness, no focal deficits to report.      PHYSICAL EXAM:  · CONSTITUTIONAL:	Well-developed, well nourished    BMI-  ·RESPIRATORY:   airway patent; breath sounds equal; good air movement; respirations non-labored; clear to auscultation bilaterally; no chest wall tenderness; no intercostal retractions; no rales, rhonchi or wheeze  · CARDIOVASCULAR	regular rate and rhythm  no rub  2/6 NEO in aortic area.  · EXTREMITIES: No cyanosis, clubbing , mild edema  · VASCULAR: 	Equal and normal pulses (carotid, femoral, dorsalis pedis)  	  TELEMETRY:    ECG:    TTE:    LABS:                        7.9    11.04 )-----------( 283      ( 13 Jun 2018 11:18 )             26.0     06-13    135  |  99  |  45<H>  ----------------------------<  113<H>  5.2<H>   |  20  |  1.7<H>    Ca    8.4<L>      13 Jun 2018 11:18              I&O's Summary    12 Jun 2018 07:01  -  13 Jun 2018 07:00  --------------------------------------------------------  IN: 0 mL / OUT: 75 mL / NET: -75 mL      BNP  RADIOLOGY & ADDITIONAL STUDIES:    IMPRESSION AND PLAN:      Assessment and Recommendation:   Problem/Recommendation - 1:  Problem: Ascending aortic dissection (Type A). Recommendation: Medical treatment.  Continue Labetolol 50mg po q6h.   Out of bed to chair and ambulate.  High risk for surgical intervention.    Problem/Recommendation - 2:  ·  Problem: Hypertension, unspecified type.  Recommendation: monitor BP  Problem/Recommendation - 3:  ·  Problem: History of deep vein thrombosis.  Recommendation: D/C eliquis.     Problem/Recommendation - 4:  ·  Problem: Dementia.  Recommendation: supportive care.     Anemia, f/u Hb/Hct    Spoke to daughter and  in detail.

## 2018-06-13 NOTE — PROGRESS NOTE ADULT - SUBJECTIVE AND OBJECTIVE BOX
Patient is a 85y old  Female who presents with a chief complaint of R. Chest pain, jaw pain, and R. arm paresthesias (2018 10:16)    Interval events:  Patient was desaturating to around 88-90 on room air, now on 2 liters NC.  COmplains of on and off shoulder pain.  feels weak  No bowel movement in last 2 days      PAST MEDICAL & SURGICAL HISTORY:  Dyslipidemia  Dementia  Thoracic aortic dissection  History of deep vein thrombosis  Hypertension, unspecified type  Status post hip surgery  H/O rotator cuff tear  H/O partial thyroidectomy      MEDICATIONS  (STANDING):  atorvastatin 80 milliGRAM(s) Oral at bedtime  cefTRIAXone   IVPB 1 Gram(s) IV Intermittent <User Schedule>  deplin 15 milliGRAM(s) 15 milliGRAM(s) Oral daily  folic acid 1 milliGRAM(s) Oral daily  furosemide    Tablet 20 milliGRAM(s) Oral <User Schedule>  furosemide   Injectable 20 milliGRAM(s) IV Push once  labetalol 50 milliGRAM(s) Oral every 6 hours  lamoTRIgine 100 milliGRAM(s) Oral daily  memantine 5 milliGRAM(s) Oral daily  memantine 10 milliGRAM(s) Oral at bedtime    MEDICATIONS  (PRN):  acetaminophen   Tablet. 650 milliGRAM(s) Oral every 6 hours PRN Mild Pain (1 - 3)  ALPRAZolam 0.25 milliGRAM(s) Oral at bedtime PRN anxiety  morphine  - Injectable 2 milliGRAM(s) IV Push every 6 hours PRN Moderate Pain (4 - 6)          Vital Signs Last 24 Hrs  T(C): 36.9 (2018 12:34), Max: 37.3 (2018 20:16)  T(F): 98.4 (2018 12:34), Max: 99.2 (2018 20:16)  HR: 77 (2018 12:50) (63 - 78)  BP: 121/58 (2018 12:50) (116/56 - 138/65)  BP(mean): --  RR: 18 (2018 20:16) (18 - 20)  SpO2: 94% (2018 12:50) (94% - 97%)  CAPILLARY BLOOD GLUCOSE        I&O's Summary    2018 07:01  -  2018 07:00  --------------------------------------------------------  IN: 0 mL / OUT: 75 mL / NET: -75 mL        Physical Exam:    -     General : sitting in chair, NAD    -      Cardiac: Regular rate    -      Pulm: bilateral basilar crackles minimal    -      GI: soft non tender    -      Musculoskeletal: no edema    -      Neuro: ao x 3, non focal        Labs:                        7.9    11.04 )-----------( 283      ( 2018 11:18 )             26.0             06-    135  |  99  |  45<H>  ----------------------------<  113<H>  5.2<H>   |  20  |  1.7<H>    Ca    8.4<L>      2018 11:18                  Urinalysis Basic - ( 2018 15:05 )    Color: Dark Yellow / Appearance: Turbid / S.025 / pH: x  Gluc: x / Ketone: Negative  / Bili: Small / Urobili: 1.0 mg/dL   Blood: x / Protein: 30 mg/dL / Nitrite: Negative   Leuk Esterase: Large / RBC: x / WBC >50 /HPF   Sq Epi: x / Non Sq Epi: Many /HPF / Bacteria: Moderate /HPF          Imaging:    ECG:        < from: Transthoracic Echocardiogram (18 @ 17:35) >    Summary:   1. Spectral Doppler shows impaired relaxation pattern of left   ventricular myocardial filling (Grade I diastolic dysfunction).   2. Sclerotic aortic valve with normal opening.   3. Dilatation of the aortic root.   4. AORTIC ROOT DILATED TO 3.9CM AND ASCENDING AORTA TO 4.9CM AND   POSSIBLE DISECTION FLAP NOTED.    < end of copied text >  CXR:  < from: Xray Chest 1 View- PORTABLE-Routine (18 @ 04:59) >  Unchanged left pleural effusion and left retrocardiac opacity/atelectasis.      < end of copied text >

## 2018-06-13 NOTE — CONSULT NOTE ADULT - ASSESSMENT
Assessment and Recommendation:   · Assessment		  Patient is a 85 year old female with pmh of CKD stage 3, thoracic aortic aneurysm, a fib, dvt, htn, hld, dementia who presented with chest pain    ·	[place] on CKD (baseline creat ~1.4 mg%) most likely secondary to hypotensive episode and cardiorenal most likely      - doubt SUSIE - creat bumped 10 days after the contrast      - urine sodium <20 c/w prerenal/cardiorenal picture      - CXR with worsening congestion and Lt pleural effusion    RX: If creat is stable today <1.7 mg% give lasix 20 IVP x1, cont LAsix 20 po daily    ·	HTN - on labetalol and LAsix, Bp dropped to 90/55 yesterday followed by rise in creatinine  ·	monitor bp, keep MAP >65  ·	check serum osmolality, urine osmolality, urine creatinine   ·	sono bladder pending check for PVR  ·	repeat BMP today  ·	HAG Met acidosis - likely due to [place]  ·	UTI  ·	c/w antibiotics  ·	Anemia - obtain iron stores, r/o GIB, serial Hct   ·	significant drop in creat - dilutional?  ·	keep Hg >7, transfuse as needed

## 2018-06-13 NOTE — CONSULT NOTE ADULT - CONSULT REASON
[place] on CKD
aortic dissection
chest pain
debility
known type B aortic dissection, new chest pain
[place] on CKD

## 2018-06-14 LAB
ANION GAP SERPL CALC-SCNC: 19 MMOL/L — HIGH (ref 7–14)
BUN SERPL-MCNC: 52 MG/DL — HIGH (ref 10–20)
CALCIUM SERPL-MCNC: 8.9 MG/DL — SIGNIFICANT CHANGE UP (ref 8.5–10.1)
CHLORIDE SERPL-SCNC: 95 MMOL/L — LOW (ref 98–110)
CO2 SERPL-SCNC: 18 MMOL/L — SIGNIFICANT CHANGE UP (ref 17–32)
CREAT SERPL-MCNC: 1.7 MG/DL — HIGH (ref 0.7–1.5)
GLUCOSE SERPL-MCNC: 133 MG/DL — HIGH (ref 70–99)
HCT VFR BLD CALC: 23.2 % — LOW (ref 37–47)
HGB BLD-MCNC: 7.2 G/DL — CRITICAL LOW (ref 12–16)
MCHC RBC-ENTMCNC: 28 PG — SIGNIFICANT CHANGE UP (ref 27–31)
MCHC RBC-ENTMCNC: 31 G/DL — LOW (ref 32–37)
MCV RBC AUTO: 90.3 FL — SIGNIFICANT CHANGE UP (ref 81–99)
NRBC # BLD: 0 /100 WBCS — SIGNIFICANT CHANGE UP (ref 0–0)
PLATELET # BLD AUTO: 340 K/UL — SIGNIFICANT CHANGE UP (ref 130–400)
POTASSIUM SERPL-MCNC: 5.1 MMOL/L — HIGH (ref 3.5–5)
POTASSIUM SERPL-SCNC: 5.1 MMOL/L — HIGH (ref 3.5–5)
RBC # BLD: 2.57 M/UL — LOW (ref 4.2–5.4)
RBC # FLD: 14.9 % — HIGH (ref 11.5–14.5)
SODIUM SERPL-SCNC: 132 MMOL/L — LOW (ref 135–146)
WBC # BLD: 10.6 K/UL — SIGNIFICANT CHANGE UP (ref 4.8–10.8)
WBC # FLD AUTO: 10.6 K/UL — SIGNIFICANT CHANGE UP (ref 4.8–10.8)

## 2018-06-14 RX ORDER — MORPHINE SULFATE 50 MG/1
2 CAPSULE, EXTENDED RELEASE ORAL ONCE
Qty: 0 | Refills: 0 | Status: DISCONTINUED | OUTPATIENT
Start: 2018-06-14 | End: 2018-06-14

## 2018-06-14 RX ORDER — FUROSEMIDE 40 MG
40 TABLET ORAL ONCE
Qty: 0 | Refills: 0 | Status: COMPLETED | OUTPATIENT
Start: 2018-06-14 | End: 2018-06-14

## 2018-06-14 RX ORDER — SENNA PLUS 8.6 MG/1
2 TABLET ORAL DAILY
Qty: 0 | Refills: 0 | Status: DISCONTINUED | OUTPATIENT
Start: 2018-06-14 | End: 2018-06-21

## 2018-06-14 RX ORDER — DOCUSATE SODIUM 100 MG
100 CAPSULE ORAL
Qty: 0 | Refills: 0 | Status: DISCONTINUED | OUTPATIENT
Start: 2018-06-14 | End: 2018-06-21

## 2018-06-14 RX ORDER — MORPHINE SULFATE 50 MG/1
2 CAPSULE, EXTENDED RELEASE ORAL EVERY 6 HOURS
Qty: 0 | Refills: 0 | Status: DISCONTINUED | OUTPATIENT
Start: 2018-06-14 | End: 2018-06-15

## 2018-06-14 RX ADMIN — MORPHINE SULFATE 2 MILLIGRAM(S): 50 CAPSULE, EXTENDED RELEASE ORAL at 12:14

## 2018-06-14 RX ADMIN — Medication 1 MILLIGRAM(S): at 11:53

## 2018-06-14 RX ADMIN — MEMANTINE HYDROCHLORIDE 5 MILLIGRAM(S): 10 TABLET ORAL at 11:59

## 2018-06-14 RX ADMIN — ATORVASTATIN CALCIUM 80 MILLIGRAM(S): 80 TABLET, FILM COATED ORAL at 21:37

## 2018-06-14 RX ADMIN — MORPHINE SULFATE 2 MILLIGRAM(S): 50 CAPSULE, EXTENDED RELEASE ORAL at 10:06

## 2018-06-14 RX ADMIN — Medication 100 MILLIGRAM(S): at 18:44

## 2018-06-14 RX ADMIN — LAMOTRIGINE 100 MILLIGRAM(S): 25 TABLET, ORALLY DISINTEGRATING ORAL at 11:58

## 2018-06-14 RX ADMIN — Medication 40 MILLIGRAM(S): at 19:53

## 2018-06-14 RX ADMIN — CEFTRIAXONE 100 GRAM(S): 500 INJECTION, POWDER, FOR SOLUTION INTRAMUSCULAR; INTRAVENOUS at 19:54

## 2018-06-14 RX ADMIN — Medication 650 MILLIGRAM(S): at 20:46

## 2018-06-14 RX ADMIN — Medication 50 MILLIGRAM(S): at 18:45

## 2018-06-14 RX ADMIN — MEMANTINE HYDROCHLORIDE 10 MILLIGRAM(S): 10 TABLET ORAL at 21:37

## 2018-06-14 RX ADMIN — Medication 650 MILLIGRAM(S): at 21:16

## 2018-06-14 RX ADMIN — Medication 50 MILLIGRAM(S): at 05:28

## 2018-06-14 RX ADMIN — SENNA PLUS 2 TABLET(S): 8.6 TABLET ORAL at 13:51

## 2018-06-14 RX ADMIN — Medication 0.25 MILLIGRAM(S): at 21:37

## 2018-06-14 RX ADMIN — Medication 100 MILLIGRAM(S): at 11:53

## 2018-06-14 NOTE — PROGRESS NOTE ADULT - ASSESSMENT
Assessment and Plan:   · Assessment		  Patient is a 85 year old female with pmh of CKD stage 3, thoracic aortic aneurysm, a fib, dvt, htn, hld, dementia who presented with chest pain and right arm parasthesia    ·	[place] on CKD (baseline creat ~1.4 mg%) most likely secondary to hypotensive episode and cardiorenal / mild hyperkalemia   ·	doubt SUSIE - creat bumped 10 days after the contrast  ·	urine sodium <20 c/w prerenal/cardiorenal picture  ·	CXR with worsening congestion and Lt pleural effusion  ·	check serum osmolarity, urine osmolarity, urine creatinine, pending   ·	sono bladder pending check for PVR  ·	repeat BMP today  ·	cont Lasix 20 po daily  ·	Ascending Aortic Dissection medically managed, on labetalol   ·	HTN - on labetalol and Lasix, Bp dropped to 90/55  followed by rise in creatinine  ·	monitor bp, keep MAP >65  ·	HAG Met acidosis - likely due to [place]  ·	UTI  ·	c/w antibiotics  ·	Anemia - obtain iron stores, r/o GIB, serial Hct   ·	significant drop in creat - dilutional?,   ·	will receive a blood tx with LAsix 40 mg IV after

## 2018-06-14 NOTE — PROGRESS NOTE ADULT - SUBJECTIVE AND OBJECTIVE BOX
pt feels well, NOT transfused, lethargic, mild memory impairemnt. Discussed w daughter Camelia - plan for home dc w PT OT.

## 2018-06-14 NOTE — PROGRESS NOTE ADULT - ASSESSMENT
Patient is a 85 year old female with pmh of CKD stage 3, thoracic aortic aneurysm, a fib, dvt, htn, hld, dementia who presented with chest pain and right arm parasthesia    ·	[place] on CKD (baseline creat ~1.4 mg%) most likely secondary to hypotensive episode and cardiorenal most likely / mild hyperkalemia   ·	doubt SUSIE - creat bumped 10 days after the contrast  ·	urine sodium <20 c/w prerenal/cardiorenal picture  ·	CXR with worsening congestion and Lt pleural effusion  ·	check serum osmolality, urine osmolality, urine creatinine, pending   ·	sono bladder pending check for PVR  ·	repeat BMP today  ·	RX: If creat is stable today <1.7 mg% give lasix 20 IVP x1, cont LAsix 20 po daily  ·	Ascending Aortic Dissection medically managed   ·	HTN - on labetalol and LAsix, Bp dropped to 90/55  followed by rise in creatinine  ·	monitor bp, keep MAP >65  ·	HAG Met acidosis - likely due to [place]  ·	UTI  ·	c/w antibiotics  ·	Anemia - obtain iron stores, r/o GIB, serial Hct   ·	significant drop in creat - dilutional?, now uptrending  ·	keep Hg >7, transfuse as needed Patient is a 85 year old female with pmh of CKD stage 3, thoracic aortic aneurysm, a fib, dvt, htn, hld, dementia who presented with chest pain and right arm parasthesia    ·	[place] on CKD (baseline creat ~1.4 mg%) most likely secondary to hypotensive episode and cardiorenal most likely / mild hyperkalemia   ·	doubt SUSIE - creat bumped 10 days after the contrast  ·	urine sodium <20 c/w prerenal/cardiorenal picture  ·	CXR with worsening congestion and Lt pleural effusion  ·	check serum osmolality, urine osmolality, urine creatinine, pending   ·	sono bladder pending check for PVR  ·	repeat BMP today  ·	RX: If creat is stable today <1.7 mg% give lasix 20 IVP x1, cont LAsix 20 po daily  ·	Ascending Aortic Dissection medically managed   ·	HTN - on labetalol and LAsix, Bp dropped to 90/55  followed by rise in creatinine  ·	monitor bp, keep MAP >65  ·	HAG Met acidosis - likely due to [place]  ·	UTI  ·	c/w antibiotics  ·	Anemia - obtain iron stores, r/o GIB, serial Hct     Please see attending progress note  ·	significant drop in creat - dilutional?, now uptrending  ·	keep Hg >7, transfuse as needed Patient is a 85 year old female with pmh of CKD stage 3, thoracic aortic aneurysm, a fib, dvt, htn, hld, dementia who presented with chest pain and right arm parasthesia    ·	[place] on CKD (baseline creat ~1.4 mg%) most likely secondary to hypotensive episode and cardiorenal most likely / mild hyperkalemia   ·	doubt SUSIE - creat bumped 10 days after the contrast  ·	urine sodium <20 c/w prerenal/cardiorenal picture  ·	CXR with worsening congestion and Lt pleural effusion  ·	check serum osmolarity, urine osmolarity, urine creatinine, pending   ·	sono bladder pending check for PVR  ·	repeat BMP today  ·	cont Lasix 20 po daily  ·	Ascending Aortic Dissection medically managed, on labetalol   ·	HTN - on labetalol and Lasix, Bp dropped to 90/55  followed by rise in creatinine  ·	monitor bp, keep MAP >65  ·	HAG Met acidosis - likely due to [place]  ·	UTI  ·	c/w antibiotics  ·	Anemia - obtain iron stores, r/o GIB, serial Hct   ·	significant drop in creat - dilutional?,   ·	keep Hg >7, transfuse as needed    Please see attending progress note

## 2018-06-14 NOTE — PROGRESS NOTE ADULT - SUBJECTIVE AND OBJECTIVE BOX
Patient is a 85y old  Female who presents with a chief complaint of R. Chest pain, jaw pain, and R. arm paresthesias (08 Jun 2018 10:16)      Interval events:  Patient looks weak today, doesnt complain of chest pain.   Last BM 3 day back    PAST MEDICAL & SURGICAL HISTORY:  Dyslipidemia  Dementia  Thoracic aortic dissection  History of deep vein thrombosis  Hypertension, unspecified type  Status post hip surgery  H/O rotator cuff tear  H/O partial thyroidectomy      MEDICATIONS  (STANDING):  atorvastatin 80 milliGRAM(s) Oral at bedtime  cefTRIAXone   IVPB 1 Gram(s) IV Intermittent <User Schedule>  deplin 15 milliGRAM(s) 15 milliGRAM(s) Oral daily  docusate sodium 100 milliGRAM(s) Oral two times a day  folic acid 1 milliGRAM(s) Oral daily  furosemide    Tablet 20 milliGRAM(s) Oral daily  furosemide   Injectable 40 milliGRAM(s) IV Push once  labetalol 50 milliGRAM(s) Oral every 6 hours  lamoTRIgine 100 milliGRAM(s) Oral daily  memantine 5 milliGRAM(s) Oral daily  memantine 10 milliGRAM(s) Oral at bedtime  senna 2 Tablet(s) Oral daily    MEDICATIONS  (PRN):  acetaminophen   Tablet. 650 milliGRAM(s) Oral every 6 hours PRN Mild Pain (1 - 3)  ALPRAZolam 0.25 milliGRAM(s) Oral at bedtime PRN anxiety  morphine  - Injectable 2 milliGRAM(s) IV Push every 6 hours PRN Moderate Pain (4 - 6)          Vital Signs Last 24 Hrs  T(C): 36.8 (14 Jun 2018 15:49), Max: 36.8 (14 Jun 2018 15:49)  T(F): 98.2 (14 Jun 2018 15:49), Max: 98.2 (14 Jun 2018 15:49)  HR: 64 (14 Jun 2018 15:49) (62 - 68)  BP: 124/75 (14 Jun 2018 16:00) (117/55 - 146/65)  BP(mean): --  RR: 18 (14 Jun 2018 15:49) (18 - 18)  SpO2: 95% (14 Jun 2018 16:00) (95% - 97%)  CAPILLARY BLOOD GLUCOSE        I&O's Summary      Physical Exam:    -     General : lying in bed, sleepy    -      Cardiac: Regular rate    -      Pulm: bilateral minimal crackles    -      GI: soft non tender    -      Musculoskeletal: no edema    -      Neuro: AO x 2,non focal        Labs:                        7.2    10.60 )-----------( 340      ( 14 Jun 2018 08:23 )             23.2             06-14    132<L>  |  95<L>  |  52<H>  ----------------------------<  133<H>  5.1<H>   |  18  |  1.7<H>    Ca    8.9      14 Jun 2018 08:23                            Imaging:    ECG:

## 2018-06-14 NOTE — PROGRESS NOTE ADULT - SUBJECTIVE AND OBJECTIVE BOX
Nephrology progress note    Patient is seen and examined, events over the last 24 h noted. Patient c/o sob and now on 2L nasal canula.     Allergies:  NSAIDs (Hives; Urticaria)    Hospital Medications:   MEDICATIONS  (STANDING):  atorvastatin 80 milliGRAM(s) Oral at bedtime  cefTRIAXone   IVPB 1 Gram(s) IV Intermittent <User Schedule>  deplin 15 milliGRAM(s) 15 milliGRAM(s) Oral daily  folic acid 1 milliGRAM(s) Oral daily  furosemide    Tablet 20 milliGRAM(s) Oral daily  labetalol 50 milliGRAM(s) Oral every 6 hours  lamoTRIgine 100 milliGRAM(s) Oral daily  memantine 5 milliGRAM(s) Oral daily  memantine 10 milliGRAM(s) Oral at bedtime  morphine  - Injectable 2 milliGRAM(s) IV Push once        VITALS:  T(F): 97.3 (18 @ 04:48), Max: 98.4 (18 @ 12:34)  HR: 62 (18 @ 04:48)  BP: 137/65 (18 @ 04:48)  RR: 18 (18 @ 20:46)  SpO2: 97% (18 @ 04:48)  Wt(kg): --     @ 07:01  -   @ 07:00  --------------------------------------------------------  IN: 0 mL / OUT: 75 mL / NET: -75 mL          PHYSICAL EXAM:  Constitutional: NAD  HEENT: anicteric sclera, oropharynx clear, MMM  Neck: No JVD  Respiratory: CTAB, no wheezes, rales or rhonchi  Cardiovascular: S1, S2, RRR  Gastrointestinal: BS+, soft, NT/ND  Extremities: No cyanosis or clubbing. No peripheral edema  :  Positive for chacko.   Skin: No rashes    LABS:      135  |  99  |  45<H>  ----------------------------<  113<H>  5.2<H>   |  20  |  1.7<H>    Ca    8.4<L>      2018 11:18    Creatinine Trend: 1.7<--, 1.7<--, 1.4<--, 1.2<--, 1.2<--, 1.1<--                        7.9    11.04 )-----------( 283      ( 2018 11:18 )             26.0     Hemoglobin: 7.9 g/dL ( @ 11:18)  Hemoglobin: 7.5 g/dL ( @ 07:43)  Hemoglobin: 7.8 g/dL ( @ 06:41)  Hemoglobin: 8.1 g/dL (06-10 @ 05:18)    Urine Studies:  Urinalysis Basic - ( 2018 15:05 )    Color: Dark Yellow / Appearance: Turbid / S.025 / pH:   Gluc:  / Ketone: Negative  / Bili: Small / Urobili: 1.0 mg/dL   Blood:  / Protein: 30 mg/dL / Nitrite: Negative   Leuk Esterase: Large / RBC:  / WBC >50 /HPF   Sq Epi:  / Non Sq Epi: Many /HPF / Bacteria: Moderate /HPF      Sodium, Random Urine: <20.0 mmoL/L ( @ 15:05)  Potassium, Random Urine: 68 mmol/L ( @ 15:05)    RADIOLOGY & ADDITIONAL STUDIES:  < from: Xray Chest 1 View- PORTABLE-Routine (18 @ 18:40) >  Impression:    Bilateral effusions and opacities, greater on the left.  < end of copied text > Nephrology progress note    Patient is seen and examined, events over the last 24 h noted .    Allergies:  NSAIDs (Hives; Urticaria)    Hospital Medications:   MEDICATIONS  (STANDING):  atorvastatin 80 milliGRAM(s) Oral at bedtime  cefTRIAXone   IVPB 1 Gram(s) IV Intermittent <User Schedule>  deplin 15 milliGRAM(s) 15 milliGRAM(s) Oral daily  folic acid 1 milliGRAM(s) Oral daily  furosemide    Tablet 20 milliGRAM(s) Oral daily  labetalol 50 milliGRAM(s) Oral every 6 hours  lamoTRIgine 100 milliGRAM(s) Oral daily  memantine 5 milliGRAM(s) Oral daily  memantine 10 milliGRAM(s) Oral at bedtime        VITALS:  T(F): 97.3 (18 @ 04:48), Max: 98.4 (18 @ 12:34)  HR: 62 (18 @ 04:48)  BP: 137/65 (18 @ 04:48)  RR: 18 (18 @ 20:46)  SpO2: 97% (18 @ 04:48)  Wt(kg): --     @ 07:01  -   @ 07:00  --------------------------------------------------------  IN: 0 mL / OUT: 75 mL / NET: -75 mL        PHYSICAL EXAM:  Constitutional: NAD  HEENT: anicteric sclera, oropharynx clear, MMM  Neck: No JVD  Respiratory: CTAB, no wheezes, rales or rhonchi  Cardiovascular: S1, S2, RRR  Gastrointestinal: BS+, soft, NT/ND  Extremities: +1 bilateral lower extremity pitting edema. No cyanosis or clubbing.   :  No chacko.   Skin: No rashes    LABS:      132<L>  |  95<L>  |  52<H>  ----------------------------<  133<H>  5.1<H>   |  18  |  1.7<H>    Ca    8.9      2018 08:23    Creatinine Trend: 1.7<--, 1.7<--, 1.7<--, 1.4<--, 1.2<--, 1.2<--                        7.2    10.60 )-----------( 340      ( 2018 08:23 )             23.2     Hemoglobin: 7.2 g/dL ( @ 08:23)  Hemoglobin: 7.9 g/dL ( @ 11:18)  Hemoglobin: 7.5 g/dL ( @ 07:43)  Hemoglobin: 7.8 g/dL ( @ 06:41)  Hemoglobin: 8.1 g/dL (06-10 @ 05:18)      Urine Studies:  Urinalysis Basic - ( 2018 15:05 )    Color: Dark Yellow / Appearance: Turbid / S.025 / pH:   Gluc:  / Ketone: Negative  / Bili: Small / Urobili: 1.0 mg/dL   Blood:  / Protein: 30 mg/dL / Nitrite: Negative   Leuk Esterase: Large / RBC:  / WBC >50 /HPF   Sq Epi:  / Non Sq Epi: Many /HPF / Bacteria: Moderate /HPF      Sodium, Random Urine: <20.0 mmoL/L ( @ 15:05)  Potassium, Random Urine: 68 mmol/L ( @ 15:05)    RADIOLOGY & ADDITIONAL STUDIES:  < from: Xray Chest 1 View- PORTABLE-Routine (18 @ 18:40) >  NTERPRETATION:  Clinical History / Reason for exam: Shortness of breath.  Comparison : Chest radiograph 2018.  Technique/Positioning: Single portable view of the chest.  Findings:  Support devices: None.  Cardiac/mediastinum/hilum: Stable.  Lung parenchyma/Pleura: Low lung volumes. Bilateral effusions and   opacities, greater on the left. No pneumothorax.  Skeleton/soft tissues: Stable.  Impression:    Bilateral effusions and opacities, greater on the left.  < end of copied text >

## 2018-06-14 NOTE — PROGRESS NOTE ADULT - ASSESSMENT
HD stable, OOB to chair, monitor hh, transfuse ONLY if HB decreases by 10%  Plan for DC early next week once home care is in place

## 2018-06-14 NOTE — PROGRESS NOTE ADULT - ASSESSMENT
86 y/o F with PMH of aortic aneurysm (diagnosed February 2017), DVT (diagnosed March, 2017) on eliquis, HLD, HTN, and dementia presented for acute onset of right chest pain, jaw pain, and R. arm paresthesias found to have worsening of aortic dissection    # Ascending aortic dissection (type A) -  Medical management, on Labetalol  BP is well contolled now  will hold for SBP < 100    # Hypertension -   Continue with Labetalol 50 mg Q6    # Gradual decrease in hemoglobin/ Normocytic anemia:  Hemoglobin drop to 7.2 today, vitals stable today  Family refused to get [place] examination today ,so stool guaiaic couldnt be done. Although suscpicion for GI bleed is very low. Will send stool for occult blood if has a BM.  Will transfuse 1 unit of PRBC today and give her 40 mg iv lasix post transfusion    # Leukocytosis/ urine analysis s/o UTI:  c/w Rocephine, f/u Urine cx    # [place] on CKD/ Hyperkalemia:  Bladder scan done post voidal showed no residual urine  Urine lytes likely Prerenal vs cardiorenal  serum osmolarity, urine osmolarity and creatinine pending    #Diastolic heart failure/ Congested CXR:  Saturating 94/95 on 3 liters NC  will give her 40 mg iv lasix once post pRBC transfusion    # Dementia  - c/w Memantine    # Dyslipidemia  - c/w Lipitor 80mg    Disposition: Will likely go home with VN   Full code status 86 y/o F with PMH of aortic aneurysm (diagnosed February 2017), DVT (diagnosed March, 2017) on eliquis, HLD, HTN, and dementia presented for acute onset of right chest pain, jaw pain, and R. arm paresthesias found to have worsening of aortic dissection    # Ascending aortic dissection (type A) -  Medical management, on Labetalol  BP is well contolled now  will hold for SBP < 100    # Hypertension -   Continue with Labetalol 50 mg Q6    # Gradual decrease in hemoglobin/ Normocytic anemia:  Hemoglobin drop to 7.2 today, vitals stable today  Family refused to get [place] examination today ,so stool guaiaic couldnt be done. Although suscpicion for GI bleed is very low. Will send stool for occult blood if has a BM.  Will transfuse 1 unit of PRBC today and give her 40 mg iv lasix post transfusion    # Leukocytosis/ urine analysis s/o UTI:  c/w Rocephine, f/u Urine cx    # [place] on CKD/ Hyperkalemia:  Bladder scan done post voidal showed no residual urine  Urine lytes likely Prerenal vs cardiorenal  serum osmolarity, urine osmolarity and creatinine pending    #Diastolic heart failure/ Congested CXR:  Saturating 94/95 on 3 liters NC  will give her 40 mg iv lasix once post pRBC transfusion    # Dementia  - c/w Memantine    # Dyslipidemia  - c/w Lipitor 80mg    Disposition: Acute  Code status: Daughter/  wants to make her DNR/ DNI, but doesnt want to let her know about this. Discussed about this with Dr. Corcoran over the phone today. Attending to discuss about this with family when he is in the hospital tomorrow

## 2018-06-14 NOTE — PROGRESS NOTE ADULT - SUBJECTIVE AND OBJECTIVE BOX
Nephrology progress note    Patient is seen and examined, events over the last 24 h noted .  C/o severe weakness. Will receive blood transfusion    Allergies:  NSAIDs (Hives; Urticaria)    Hospital Medications:   MEDICATIONS  (STANDING):  atorvastatin 80 milliGRAM(s) Oral at bedtime  cefTRIAXone   IVPB 1 Gram(s) IV Intermittent <User Schedule>  deplin 15 milliGRAM(s) 15 milliGRAM(s) Oral daily  folic acid 1 milliGRAM(s) Oral daily  furosemide    Tablet 20 milliGRAM(s) Oral daily  labetalol 50 milliGRAM(s) Oral every 6 hours  lamoTRIgine 100 milliGRAM(s) Oral daily  memantine 5 milliGRAM(s) Oral daily  memantine 10 milliGRAM(s) Oral at bedtime        VITALS:  T(F): 97.3 (18 @ 04:48), Max: 98.4 (18 @ 12:34)  HR: 62 (18 @ 04:48)  BP: 137/65 (18 @ 04:48)  RR: 18 (18 @ 20:46)  SpO2: 97% (18 @ 04:48)  Wt(kg): --     @ 07:01  -   @ 07:00  --------------------------------------------------------  IN: 0 mL / OUT: 75 mL / NET: -75 mL        PHYSICAL EXAM:  Constitutional: NAD  HEENT: anicteric sclera, oropharynx clear, MMM  Neck: No JVD  Respiratory: CTAB, no wheezes, rales or rhonchi  Cardiovascular: S1, S2, RRR  Gastrointestinal: BS+, soft, NT/ND  Extremities: +1 bilateral lower extremity pitting edema. No cyanosis or clubbing.   :  No chacko.   Skin: No rashes    LABS:      132<L>  |  95<L>  |  52<H>  ----------------------------<  133<H>  5.1<H>   |  18  |  1.7<H>    Ca    8.9      2018 08:23    Creatinine Trend: 1.7<--, 1.7<--, 1.7<--, 1.4<--, 1.2<--, 1.2<--                        7.2    10.60 )-----------( 340      ( 2018 08:23 )             23.2     Hemoglobin: 7.2 g/dL ( @ 08:23)  Hemoglobin: 7.9 g/dL ( @ 11:18)  Hemoglobin: 7.5 g/dL ( @ 07:43)  Hemoglobin: 7.8 g/dL ( @ 06:41)  Hemoglobin: 8.1 g/dL (06-10 @ 05:18)      Urine Studies:  Urinalysis Basic - ( 2018 15:05 )    Color: Dark Yellow / Appearance: Turbid / S.025 / pH:   Gluc:  / Ketone: Negative  / Bili: Small / Urobili: 1.0 mg/dL   Blood:  / Protein: 30 mg/dL / Nitrite: Negative   Leuk Esterase: Large / RBC:  / WBC >50 /HPF   Sq Epi:  / Non Sq Epi: Many /HPF / Bacteria: Moderate /HPF      Sodium, Random Urine: <20.0 mmoL/L ( @ 15:05)  Potassium, Random Urine: 68 mmol/L ( @ 15:05)    RADIOLOGY & ADDITIONAL STUDIES:  < from: Xray Chest 1 View- PORTABLE-Routine (18 @ 18:40) >  NTERPRETATION:  Clinical History / Reason for exam: Shortness of breath.  Comparison : Chest radiograph 2018.  Technique/Positioning: Single portable view of the chest.  Findings:  Support devices: None.  Cardiac/mediastinum/hilum: Stable.  Lung parenchyma/Pleura: Low lung volumes. Bilateral effusions and   opacities, greater on the left. No pneumothorax.  Skeleton/soft tissues: Stable.  Impression:    Bilateral effusions and opacities, greater on the left.  < end of copied text >

## 2018-06-15 LAB
ANION GAP SERPL CALC-SCNC: 18 MMOL/L — HIGH (ref 7–14)
BLD GP AB SCN SERPL QL: SIGNIFICANT CHANGE UP
BUN SERPL-MCNC: 54 MG/DL — HIGH (ref 10–20)
CALCIUM SERPL-MCNC: 8.5 MG/DL — SIGNIFICANT CHANGE UP (ref 8.5–10.1)
CHLORIDE SERPL-SCNC: 97 MMOL/L — LOW (ref 98–110)
CO2 SERPL-SCNC: 18 MMOL/L — SIGNIFICANT CHANGE UP (ref 17–32)
CREAT SERPL-MCNC: 1.8 MG/DL — HIGH (ref 0.7–1.5)
GLUCOSE SERPL-MCNC: 109 MG/DL — HIGH (ref 70–99)
HCT VFR BLD CALC: 26.3 % — LOW (ref 37–47)
HGB BLD-MCNC: 8.3 G/DL — LOW (ref 12–16)
MCHC RBC-ENTMCNC: 28.3 PG — SIGNIFICANT CHANGE UP (ref 27–31)
MCHC RBC-ENTMCNC: 31.6 G/DL — LOW (ref 32–37)
MCV RBC AUTO: 89.8 FL — SIGNIFICANT CHANGE UP (ref 81–99)
NRBC # BLD: 0 /100 WBCS — SIGNIFICANT CHANGE UP (ref 0–0)
PLATELET # BLD AUTO: 355 K/UL — SIGNIFICANT CHANGE UP (ref 130–400)
POTASSIUM SERPL-MCNC: 5 MMOL/L — SIGNIFICANT CHANGE UP (ref 3.5–5)
POTASSIUM SERPL-SCNC: 5 MMOL/L — SIGNIFICANT CHANGE UP (ref 3.5–5)
RBC # BLD: 2.93 M/UL — LOW (ref 4.2–5.4)
RBC # FLD: 14.7 % — HIGH (ref 11.5–14.5)
SODIUM SERPL-SCNC: 133 MMOL/L — LOW (ref 135–146)
TYPE + AB SCN PNL BLD: SIGNIFICANT CHANGE UP
WBC # BLD: 11.78 K/UL — HIGH (ref 4.8–10.8)
WBC # FLD AUTO: 11.78 K/UL — HIGH (ref 4.8–10.8)

## 2018-06-15 RX ORDER — FUROSEMIDE 40 MG
20 TABLET ORAL
Qty: 0 | Refills: 0 | Status: DISCONTINUED | OUTPATIENT
Start: 2018-06-15 | End: 2018-06-18

## 2018-06-15 RX ORDER — ATORVASTATIN CALCIUM 80 MG/1
10 TABLET, FILM COATED ORAL AT BEDTIME
Qty: 0 | Refills: 0 | Status: DISCONTINUED | OUTPATIENT
Start: 2018-06-15 | End: 2018-06-18

## 2018-06-15 RX ADMIN — MORPHINE SULFATE 2 MILLIGRAM(S): 50 CAPSULE, EXTENDED RELEASE ORAL at 06:03

## 2018-06-15 RX ADMIN — LAMOTRIGINE 100 MILLIGRAM(S): 25 TABLET, ORALLY DISINTEGRATING ORAL at 11:23

## 2018-06-15 RX ADMIN — Medication 50 MILLIGRAM(S): at 00:07

## 2018-06-15 RX ADMIN — Medication 50 MILLIGRAM(S): at 18:50

## 2018-06-15 RX ADMIN — Medication 650 MILLIGRAM(S): at 03:56

## 2018-06-15 RX ADMIN — SENNA PLUS 2 TABLET(S): 8.6 TABLET ORAL at 11:23

## 2018-06-15 RX ADMIN — Medication 100 MILLIGRAM(S): at 05:11

## 2018-06-15 RX ADMIN — CEFTRIAXONE 100 GRAM(S): 500 INJECTION, POWDER, FOR SOLUTION INTRAMUSCULAR; INTRAVENOUS at 18:53

## 2018-06-15 RX ADMIN — Medication 20 MILLIGRAM(S): at 17:32

## 2018-06-15 RX ADMIN — MEMANTINE HYDROCHLORIDE 5 MILLIGRAM(S): 10 TABLET ORAL at 11:23

## 2018-06-15 RX ADMIN — Medication 100 MILLIGRAM(S): at 17:32

## 2018-06-15 RX ADMIN — Medication 1 MILLIGRAM(S): at 11:23

## 2018-06-15 RX ADMIN — Medication 20 MILLIGRAM(S): at 05:11

## 2018-06-15 RX ADMIN — MORPHINE SULFATE 2 MILLIGRAM(S): 50 CAPSULE, EXTENDED RELEASE ORAL at 05:11

## 2018-06-15 RX ADMIN — Medication 650 MILLIGRAM(S): at 04:45

## 2018-06-15 RX ADMIN — MEMANTINE HYDROCHLORIDE 10 MILLIGRAM(S): 10 TABLET ORAL at 21:45

## 2018-06-15 NOTE — PROGRESS NOTE ADULT - ASSESSMENT
86 y/o F with PMH of aortic aneurysm (diagnosed February 2017), DVT (diagnosed March, 2017) on eliquis, HLD, HTN, and dementia presented for acute onset of right chest pain, jaw pain, and R. arm paresthesias found to have worsening of aortic dissection    # Ascending aortic dissection (type A) -  Medical management, on Labetalol  BP is well contolled now  will hold for SBP < 100    # Hypertension -   Continue with Labetalol 50 mg Q6    # Gradual decrease in hemoglobin/ Normocytic anemia:  Hemoglobin 8.2 today, status post transfusion 1 unit on 6/14  will monitor    # Leukocytosis/ urine analysis s/o UTI:  c/w Rocephine, f/u Urine cx    # [place] on CKD/ Hyperkalemia:  Bladder scan done post voidal showed no residual urine  Urine lytes likely Prerenal vs cardiorenal      #Congestive heart failure:  Being treated with lasix, still is hypoxic to 88% at room air on rest. Tested in chronic stable state.  Patient aware going home with Oxygen via NC 3 liters      # Dementia  - c/w Memantine    # Dyslipidemia  - c/w Lipitor 80mg    Disposition: Anticipated for tomorrow for home with home care/ home oxygen  # Code status: Family decided to make her DNR / DNI after discussing with Dr. Sykes. 84 y/o F with PMH of aortic aneurysm (diagnosed February 2017), DVT (diagnosed March, 2017) on eliquis, HLD, HTN, and dementia presented for acute onset of right chest pain, jaw pain, and R. arm paresthesias found to have worsening of aortic dissection    # Ascending aortic dissection (type A) -  Medical management, on Labetalol  BP is well contolled now  will hold for SBP < 100    # Hypertension -   Continue with Labetalol 50 mg Q6    # Gradual decrease in hemoglobin/ Normocytic anemia:  Hemoglobin 8.2 today, status post transfusion 1 unit on 6/14  will monitor  No evidence of active bleeding    # Leukocytosis/ urine analysis s/o UTI:  c/w Rocephin    # [place] on CKD/ Hyperkalemia:  Bladder scan done post voidal showed no residual urine  Urine lytes likely Prerenal vs cardiorenal      #Congestive heart failure:  Being treated with lasix, still is hypoxic to 88% at room air on rest. Tested in chronic stable state.  Patient aware going home with Oxygen via NC 3 liters  WIll give extra dose of lasix 20 mg iv today      # Dementia  - c/w Memantine    # Dyslipidemia  - c/w Lipitor 80mg    Disposition: Anticipated for tomorrow for home with home care/ home oxygen  # Code status: Family decided to make her DNR / DNI after discussing with Dr. Sykes.

## 2018-06-15 NOTE — PROGRESS NOTE ADULT - SUBJECTIVE AND OBJECTIVE BOX
seen/examined chart reviewed, clinically without change is out of bed,s/p prb x 1 unit no sob, c/o feeling weak, ambulated a few steps yesterday    no jvd  rhonchi   rrr  soft nt nd + bs  no e/c/c

## 2018-06-15 NOTE — PROVIDER CONTACT NOTE (MEDICATION) - REASON
Low blood pressure(95/58) Holding Labetelol, Oxygen saturation 88 on room air, still requires oxygen

## 2018-06-15 NOTE — PROVIDER CONTACT NOTE (MEDICATION) - ASSESSMENT
Patient is very tired, has little energy at this time, Does not feel able to attempt oob at this time. Patients has 1 family member at bedside at this time.

## 2018-06-15 NOTE — PROGRESS NOTE ADULT - SUBJECTIVE AND OBJECTIVE BOX
Nephrology progress note    Patient is seen and examined, events over the last 24 h noted .  c/o mild SOB, orthopnea    Allergies:  NSAIDs (Hives; Urticaria)    Hospital Medications:   MEDICATIONS  (STANDING):  atorvastatin 80 milliGRAM(s) Oral at bedtime  cefTRIAXone   IVPB 1 Gram(s) IV Intermittent <User Schedule>  deplin 15 milliGRAM(s) 15 milliGRAM(s) Oral daily  docusate sodium 100 milliGRAM(s) Oral two times a day  folic acid 1 milliGRAM(s) Oral daily  furosemide    Tablet 20 milliGRAM(s) Oral daily  furosemide   Injectable 20 milliGRAM(s) IV Push <User Schedule>  labetalol 50 milliGRAM(s) Oral every 6 hours  lamoTRIgine 100 milliGRAM(s) Oral daily  memantine 5 milliGRAM(s) Oral daily  memantine 10 milliGRAM(s) Oral at bedtime  senna 2 Tablet(s) Oral daily        VITALS:  T(F): 97.4 (06-15-18 @ 13:27), Max: 98 (18 @ 21:26)  HR: 66 (06-15-18 @ 13:27)  BP: 130/75 (06-15-18 @ 13:27)  RR: 20 (06-15-18 @ 13:27)  SpO2: 92% (06-15-18 @ 11:15)  Wt(kg): --     @ 07:01  -  06-15 @ 07:00  --------------------------------------------------------  IN: 100 mL / OUT: 0 mL / NET: 100 mL          PHYSICAL EXAM:  Constitutional: NAD  HEENT: anicteric sclera, oropharynx clear, MMM  Neck: No JVD  Respiratory: basilar crackles  Cardiovascular: S1, S2, RRR  Gastrointestinal: BS+, soft, NT/ND  Extremities: mild  peripheral edema  Neurological: A/O x 3  : No CVA tenderness. No chacko.   Skin: No rashes  Vascular Access:    LABS:  06-15    133<L>  |  97<L>  |  54<H>  ----------------------------<  109<H>  5.0   |  18  |  1.8<H>    Ca    8.5      15 Jeferson 2018 06:53                            8.3    11.78 )-----------( 355      ( 15 Jeferson 2018 06:53 )             26.3       Urine Studies:  Urinalysis Basic - ( 2018 15:05 )    Color: Dark Yellow / Appearance: Turbid / S.025 / pH:   Gluc:  / Ketone: Negative  / Bili: Small / Urobili: 1.0 mg/dL   Blood:  / Protein: 30 mg/dL / Nitrite: Negative   Leuk Esterase: Large / RBC:  / WBC >50 /HPF   Sq Epi:  / Non Sq Epi: Many /HPF / Bacteria: Moderate /HPF      Sodium, Random Urine: <20.0 mmoL/L ( @ 15:05)  Potassium, Random Urine: 68 mmol/L ( @ 15:05)    RADIOLOGY & ADDITIONAL STUDIES:

## 2018-06-15 NOTE — PROGRESS NOTE ADULT - ASSESSMENT
Assessment and Plan:   · Assessment		  Patient is a 85 year old female with pmh of CKD stage 3, thoracic aortic aneurysm, a fib, dvt, htn, hld, dementia who presented with chest pain and right arm parasthesia    ·	[place] on CKD (baseline creat ~1.4 mg%) most likely secondary to hypotensive episode and cardiorenal / mild hyperkalemia   ·	doubt SUSIE - creat bumped 10 days after the contrast  ·	urine sodium <20 c/w prerenal/cardiorenal picture  ·	CXR with worsening congestion and Lt pleural effusion  ·	increase LAsix to 40 mg qd (may give an extra dose of Lasix 20 mg IV x1 tonight)  ·	please check [facility]ONO for PVR  ·	  ·	Ascending Aortic Dissection medically managed, on labetalol   ·	HTN - on labetalol and Lasix, Bp dropped to 90/55  followed by rise in creatinine  ·	monitor bp, keep MAP >65  ·	HAG Met acidosis - likely due to [place]        start Na bicarb 650 po bid  ·	UTI  ·	c/w antibiotics  ·	Anemia - obtain iron stores, r/o GIB, serial Hct   ·	s/p RBC tx yesterday 1 unit      D/W Family and HS

## 2018-06-15 NOTE — PROGRESS NOTE ADULT - ASSESSMENT
86 y/o F with PMH of aortic aneurysm      1) Ascending aortic dissection (type A)  -bp control  -discussed with cardio     2) anemia- s/p transfusion 1 unit prbc  -follow cbc  -start iron qd    3)disposition  -pt , oob, chair, ambulate  - family wants to take pt home, cont pt at home    4) code status, DNR signed by me in chart, needs family to sign

## 2018-06-15 NOTE — PROGRESS NOTE ADULT - SUBJECTIVE AND OBJECTIVE BOX
Patient is a 85y old  Female who presents with a chief complaint of R. Chest pain, jaw pain, and R. arm paresthesias (08 Jun 2018 10:16)      Interval events:  sitting on chair, complains of having on and off shoulder pain    PAST MEDICAL & SURGICAL HISTORY:  Dyslipidemia  Dementia  Thoracic aortic dissection  History of deep vein thrombosis  Hypertension, unspecified type  Status post hip surgery  H/O rotator cuff tear  H/O partial thyroidectomy      MEDICATIONS  (STANDING):  atorvastatin 80 milliGRAM(s) Oral at bedtime  cefTRIAXone   IVPB 1 Gram(s) IV Intermittent <User Schedule>  deplin 15 milliGRAM(s) 15 milliGRAM(s) Oral daily  docusate sodium 100 milliGRAM(s) Oral two times a day  folic acid 1 milliGRAM(s) Oral daily  furosemide    Tablet 20 milliGRAM(s) Oral daily  labetalol 50 milliGRAM(s) Oral every 6 hours  lamoTRIgine 100 milliGRAM(s) Oral daily  memantine 5 milliGRAM(s) Oral daily  memantine 10 milliGRAM(s) Oral at bedtime  senna 2 Tablet(s) Oral daily    MEDICATIONS  (PRN):  acetaminophen   Tablet. 650 milliGRAM(s) Oral every 6 hours PRN Mild Pain (1 - 3)  ALPRAZolam 0.25 milliGRAM(s) Oral at bedtime PRN anxiety  morphine  - Injectable 2 milliGRAM(s) IV Push every 6 hours PRN Severe Pain (7 - 10)          Vital Signs Last 24 Hrs  T(C): 36.3 (15 Jeferson 2018 13:27), Max: 37.1 (14 Jun 2018 16:30)  T(F): 97.4 (15 Jeferson 2018 13:27), Max: 98.8 (14 Jun 2018 16:30)  HR: 66 (15 Jeferson 2018 13:27) (61 - 80)  BP: 130/75 (15 Jeferson 2018 13:27) (95/58 - 163/97)  BP(mean): --  RR: 20 (15 Jeferson 2018 13:27) (18 - 20)  SpO2: 92% (15 Jeferson 2018 11:15) (92% - 95%)  CAPILLARY BLOOD GLUCOSE        I&O's Summary    14 Jun 2018 07:01  -  15 Jeferson 2018 07:00  --------------------------------------------------------  IN: 100 mL / OUT: 0 mL / NET: 100 mL        Physical Exam:    -     General : stting on chair, NAD    -      Cardiac: Regular rate and rhythm    -      Pulm: Minimal Bilateral basilar crackles    -      GI: soft non tender    -      Musculoskeletal: no edema    -      Neuro: AO x 2, non focal        Labs:                        8.3    11.78 )-----------( 355      ( 15 Jeferson 2018 06:53 )             26.3             06-15    133<L>  |  97<L>  |  54<H>  ----------------------------<  109<H>  5.0   |  18  |  1.8<H>    Ca    8.5      15 Jeferson 2018 06:53                            Imaging:    ECG:

## 2018-06-15 NOTE — CHART NOTE - NSCHARTNOTEFT_GEN_A_CORE
Registered Dietitian At Risk Follow-Up     Patient Profile Reviewed                           Yes [x]   No []     Nutrition History Previously Obtained        Yes [x]  No []       Pertinent Subjective Information: Spoke to pt's  at bedside as pt was sleeping upon RD visit this morning. He reports that she is consuming ~50% of meal trays depends on whether she likes her meal tray for the day or not. Willing to trial oral supplement for adequate nutrition.      Pertinent Medical Interventions:  Ascending aortic dissection (type A)--BP is well controlled now. Anemia s/p transfusion 1 unit prbc. Family wishes to take pt home.      Diet order: DASH/TLC, No conc K+      Anthropometrics:  - Ht. 66"  - Wt. 172#/78kg--wt gain likely 2/2 fluids     Pertinent Lab Data: Reviewed (6/15): Na 133, BUN 54, Cr. 1.8, Glu 109      Pertinent Meds: abx, colace, senna, lasix, labetalol, lipitor      Physical Findings:  - Appearance: sleeping upon visit   - GI function: c/o constipation   - Oral/Mouth cavity: denies difficulty swallowing/chewing   - Skin: Jevon 14      Nutrition Requirements  Weight Used: CBW is 71.3 (on lasix), IBW is 45.4kg     Estimated Energy Needs: 3125-7213 kcal/day (MSJ x 1.2-1.3)--remains as of 6/8      Estimated Protein Needs: 50-58 g/day (1.1-1.3 g/kg of IBW)--remains as of 6/8      Estimated Fluid Needs: per LIP      Nutrient Intake: not meeting kcal/pro needs at this time d/t 50% po intake     Nutrition Diagnostic #1  Problem: inadequate oral intake   Etiology: decreased appetite 2/2 available food preferences   Statement: ~50% po intake per pt's       Nutrition Intervention: meals and snacks, medical food supplements     Recommendations:   1. Continue current diet order   2. Order Ensure Enlive BID      Goal/Expected Outcome: Pt to consume >50% of meals, snacks, and supplements within 4 days.      Indicator/Monitoring: RD to monitor diet order, energy intake, nutrition focused physical findings

## 2018-06-16 LAB
ANION GAP SERPL CALC-SCNC: 17 MMOL/L — HIGH (ref 7–14)
BUN SERPL-MCNC: 54 MG/DL — HIGH (ref 10–20)
CALCIUM SERPL-MCNC: 8.6 MG/DL — SIGNIFICANT CHANGE UP (ref 8.5–10.1)
CHLORIDE SERPL-SCNC: 98 MMOL/L — SIGNIFICANT CHANGE UP (ref 98–110)
CK SERPL-CCNC: 31 U/L — SIGNIFICANT CHANGE UP (ref 0–225)
CO2 SERPL-SCNC: 19 MMOL/L — SIGNIFICANT CHANGE UP (ref 17–32)
CREAT SERPL-MCNC: 1.6 MG/DL — HIGH (ref 0.7–1.5)
GLUCOSE SERPL-MCNC: 117 MG/DL — HIGH (ref 70–99)
HCT VFR BLD CALC: 27.5 % — LOW (ref 37–47)
HGB BLD-MCNC: 8.6 G/DL — LOW (ref 12–16)
MCHC RBC-ENTMCNC: 27.7 PG — SIGNIFICANT CHANGE UP (ref 27–31)
MCHC RBC-ENTMCNC: 31.3 G/DL — LOW (ref 32–37)
MCV RBC AUTO: 88.7 FL — SIGNIFICANT CHANGE UP (ref 81–99)
NRBC # BLD: 0 /100 WBCS — SIGNIFICANT CHANGE UP (ref 0–0)
PLATELET # BLD AUTO: 382 K/UL — SIGNIFICANT CHANGE UP (ref 130–400)
POTASSIUM SERPL-MCNC: 4.6 MMOL/L — SIGNIFICANT CHANGE UP (ref 3.5–5)
POTASSIUM SERPL-SCNC: 4.6 MMOL/L — SIGNIFICANT CHANGE UP (ref 3.5–5)
RBC # BLD: 3.1 M/UL — LOW (ref 4.2–5.4)
RBC # FLD: 14.9 % — HIGH (ref 11.5–14.5)
SODIUM SERPL-SCNC: 134 MMOL/L — LOW (ref 135–146)
WBC # BLD: 14.24 K/UL — HIGH (ref 4.8–10.8)
WBC # FLD AUTO: 14.24 K/UL — HIGH (ref 4.8–10.8)

## 2018-06-16 RX ORDER — FUROSEMIDE 40 MG
20 TABLET ORAL ONCE
Qty: 0 | Refills: 0 | Status: COMPLETED | OUTPATIENT
Start: 2018-06-16 | End: 2018-06-16

## 2018-06-16 RX ORDER — SODIUM BICARBONATE 1 MEQ/ML
650 SYRINGE (ML) INTRAVENOUS THREE TIMES A DAY
Qty: 0 | Refills: 0 | Status: DISCONTINUED | OUTPATIENT
Start: 2018-06-16 | End: 2018-06-22

## 2018-06-16 RX ORDER — MORPHINE SULFATE 50 MG/1
1 CAPSULE, EXTENDED RELEASE ORAL ONCE
Qty: 0 | Refills: 0 | Status: DISCONTINUED | OUTPATIENT
Start: 2018-06-16 | End: 2018-06-16

## 2018-06-16 RX ADMIN — Medication 1 MILLIGRAM(S): at 11:29

## 2018-06-16 RX ADMIN — CEFTRIAXONE 100 GRAM(S): 500 INJECTION, POWDER, FOR SOLUTION INTRAMUSCULAR; INTRAVENOUS at 18:28

## 2018-06-16 RX ADMIN — SENNA PLUS 2 TABLET(S): 8.6 TABLET ORAL at 11:30

## 2018-06-16 RX ADMIN — Medication 20 MILLIGRAM(S): at 18:27

## 2018-06-16 RX ADMIN — Medication 20 MILLIGRAM(S): at 19:03

## 2018-06-16 RX ADMIN — Medication 50 MILLIGRAM(S): at 23:03

## 2018-06-16 RX ADMIN — Medication 20 MILLIGRAM(S): at 06:13

## 2018-06-16 RX ADMIN — ATORVASTATIN CALCIUM 10 MILLIGRAM(S): 80 TABLET, FILM COATED ORAL at 00:27

## 2018-06-16 RX ADMIN — Medication 100 MILLIGRAM(S): at 18:27

## 2018-06-16 RX ADMIN — MEMANTINE HYDROCHLORIDE 5 MILLIGRAM(S): 10 TABLET ORAL at 11:30

## 2018-06-16 RX ADMIN — LAMOTRIGINE 100 MILLIGRAM(S): 25 TABLET, ORALLY DISINTEGRATING ORAL at 11:29

## 2018-06-16 RX ADMIN — MEMANTINE HYDROCHLORIDE 10 MILLIGRAM(S): 10 TABLET ORAL at 21:44

## 2018-06-16 RX ADMIN — Medication 650 MILLIGRAM(S): at 21:44

## 2018-06-16 RX ADMIN — Medication 100 MILLIGRAM(S): at 06:14

## 2018-06-16 RX ADMIN — Medication 50 MILLIGRAM(S): at 18:26

## 2018-06-16 RX ADMIN — Medication 50 MILLIGRAM(S): at 00:26

## 2018-06-16 RX ADMIN — Medication 50 MILLIGRAM(S): at 12:09

## 2018-06-16 NOTE — PROGRESS NOTE ADULT - SUBJECTIVE AND OBJECTIVE BOX
seen and examined  no distress  lying comfortable       Standing Inpatient Medications  atorvastatin 10 milliGRAM(s) Oral at bedtime  cefTRIAXone   IVPB 1 Gram(s) IV Intermittent <User Schedule>  deplin 15 milliGRAM(s) 15 milliGRAM(s) Oral daily  docusate sodium 100 milliGRAM(s) Oral two times a day  folic acid 1 milliGRAM(s) Oral daily  furosemide    Tablet 20 milliGRAM(s) Oral daily  furosemide   Injectable 20 milliGRAM(s) IV Push <User Schedule>  labetalol 50 milliGRAM(s) Oral every 6 hours  lamoTRIgine 100 milliGRAM(s) Oral daily  memantine 5 milliGRAM(s) Oral daily  memantine 10 milliGRAM(s) Oral at bedtime  senna 2 Tablet(s) Oral daily        VITALS/PHYSICAL EXAM  --------------------------------------------------------------------------------  T(C): 36.2 (06-16-18 @ 05:18), Max: 36.5 (06-15-18 @ 21:14)  HR: 68 (06-16-18 @ 05:18) (62 - 75)  BP: 117/77 (06-16-18 @ 05:18) (95/58 - 142/71)  RR: 20 (06-16-18 @ 05:18) (20 - 22)  SpO2: 92% (06-15-18 @ 11:15) (88% - 92%)  Wt(kg): --        Physical Exam:  	Gen: NAD, on o2   	Pulm: decrease BS  B/L  	CV:  S1S2; no rub  	Abd: +distended  	LE: edema      LABS/STUDIES  --------------------------------------------------------------------------------              8.6    14.24 >-----------<  382      [06-16-18 @ 05:56]              27.5     133  |  97  |  54  ----------------------------<  109      [06-15-18 @ 06:53]  5.0   |  18  |  1.8        Ca     8.5     [06-15-18 @ 06:53]            Creatinine Trend:  SCr 1.8 [06-15 @ 06:53]  SCr 1.7 [06-14 @ 08:23]  SCr 1.7 [06-13 @ 11:18]  SCr 1.7 [06-12 @ 07:43]  SCr 1.4 [06-11 @ 06:41]    Urinalysis - [06-12-18 @ 15:05]      Color Dark Yellow / Appearance Turbid / SG 1.025 / pH 5.5      Gluc Negative / Ketone Negative  / Bili Small / Urobili 1.0       Blood Negative / Protein 30 / Leuk Est Large / Nitrite Negative      RBC  / WBC >50 / Hyaline  / Gran  / Sq Epi  / Non Sq Epi Many / Bacteria Moderate    Urine Sodium <20.0      [06-12-18 @ 15:05]  Urine Potassium 68      [06-12-18 @ 15:05]    Lipid: chol 120, TG 91, HDL 40, LDL 60      [06-02-18 @ 04:45]

## 2018-06-16 NOTE — PROGRESS NOTE ADULT - ASSESSMENT
84 y/o F with PMH of aortic aneurysm (diagnosed February 2017), DVT (diagnosed March, 2017) on eliquis, HLD, HTN, and dementia presented for acute onset of right chest pain, jaw pain, and R. arm paresthesias found to have worsening of aortic dissection    # Ascending aortic dissection (type A) -  Medical management, on Labetalol  BP is well contolled now  will hold for SBP < 100    # Hypertension -   Continue with Labetalol 50 mg Q6    # Gradual decrease in hemoglobin/ Normocytic anemia:  status post transfusion 1 unit on 6/14  will monitor  No evidence of active bleeding    # Leukocytosis/ urine analysis s/o UTI:  c/w Rocephin, urine culture was ordered but never sent  Urine culture resordered  WBC count increased today, will repeat CXR. f/u urine culture    # [place] on CKD/ Hyperkalemia:  Bladder scan done post voidal showed no residual urine  Urine lytes likely Prerenal vs cardiorenal  Anion gap metabolic acidosis: will start PO bicarb as per Nephro      #Congestive heart failure:  Patient hypoxic to 88% at rest in room air, will likely need home O2 on discharge  Will give extra dose of iv lasix 20 mg today ( with regular dose of 20 mg PO)      # Dementia  - c/w Memantine    # Dyslipidemia  Dose of lipitor reduced to 10 mg, home dose.    Disposition: Home with home care likely on Monday  # Code status: DNR/ DNI 86 y/o F with PMH of aortic aneurysm (diagnosed February 2017), DVT (diagnosed March, 2017) on eliquis, HLD, HTN, and dementia presented for acute onset of right chest pain, jaw pain, and R. arm paresthesias found to have worsening of aortic dissection    # Ascending aortic dissection (type A) -  Medical management, on Labetalol  BP is well contolled now  will hold for SBP < 100    # Hypertension -   Continue with Labetalol 50 mg Q6    # Gradual decrease in hemoglobin/ Normocytic anemia:  status post transfusion 1 unit on 6/14  will monitor  No evidence of active bleeding    # Leukocytosis/ urine analysis s/o UTI:  c/w Rocephin, urine culture was ordered but never sent  Urine culture resordered  WBC count increased today,. f/u urine culture    # [place] on CKD/ Hyperkalemia:  Bladder scan done post voidal showed no residual urine  Urine lytes likely Prerenal vs cardiorenal  Anion gap metabolic acidosis: will start PO bicarb as per Nephro      #Congestive heart failure:  Patient hypoxic to 88% at rest in room air, will likely need home O2 on discharge  Will give extra dose of iv lasix 20 mg today ( with regular dose of 20 mg PO)      # Dementia  - c/w Memantine    # Dyslipidemia  Dose of lipitor reduced to 10 mg, home dose.    Disposition: Home with home care likely on Monday  # Code status: DNR/ DNI 86 y/o F with PMH of aortic aneurysm (diagnosed February 2017), DVT (diagnosed March, 2017) on eliquis, HLD, HTN, and dementia presented for acute onset of right chest pain, jaw pain, and R. arm paresthesias found to have worsening of aortic dissection    # Ascending aortic dissection (type A) -  Medical management, on Labetalol  BP is well contolled now  will hold for SBP < 100    # Hypertension -   Continue with Labetalol 50 mg Q6    # Gradual decrease in hemoglobin/ Normocytic anemia:  status post transfusion 1 unit on 6/14  will monitor  No evidence of active bleeding    # Leukocytosis/ urine analysis s/o UTI:  c/w Rocephin, urine culture was ordered but never sent  Urine culture reordered  WBC count increased today,. f/u urine culture    # [place] on CKD/ Hyperkalemia:  Bladder scan done post voidal showed no residual urine  Urine lytes likely Prerenal vs cardiorenal  Anion gap metabolic acidosis: will start PO bicarb as per Nephro      #Congestive heart failure:  Patient hypoxic to 88% at rest in room air, will likely need home O2 on discharge  Will give extra dose of iv lasix 20 mg today ( with regular dose of 20 mg PO)      # Dementia  - c/w Memantine    # Dyslipidemia  Dose of lipitor reduced to 10 mg, home dose.    Disposition: Home with home care likely on Monday  # Code status: DNR/ DNI

## 2018-06-16 NOTE — PROGRESS NOTE ADULT - SUBJECTIVE AND OBJECTIVE BOX
Patient is a 85y old  Female who presents with a chief complaint of R. Chest pain, jaw pain, and R. arm paresthesias (08 Jun 2018 10:16)      Interval events:  Patient lying in bed, mentions feeling weak    PAST MEDICAL & SURGICAL HISTORY:  Dyslipidemia  Dementia  Thoracic aortic dissection  History of deep vein thrombosis  Hypertension, unspecified type  Status post hip surgery  H/O rotator cuff tear  H/O partial thyroidectomy      MEDICATIONS  (STANDING):  atorvastatin 10 milliGRAM(s) Oral at bedtime  cefTRIAXone   IVPB 1 Gram(s) IV Intermittent <User Schedule>  deplin 15 milliGRAM(s) 15 milliGRAM(s) Oral daily  docusate sodium 100 milliGRAM(s) Oral two times a day  folic acid 1 milliGRAM(s) Oral daily  furosemide    Tablet 20 milliGRAM(s) Oral daily  furosemide   Injectable 20 milliGRAM(s) IV Push <User Schedule>  labetalol 50 milliGRAM(s) Oral every 6 hours  lamoTRIgine 100 milliGRAM(s) Oral daily  memantine 5 milliGRAM(s) Oral daily  memantine 10 milliGRAM(s) Oral at bedtime  senna 2 Tablet(s) Oral daily    MEDICATIONS  (PRN):  acetaminophen   Tablet. 650 milliGRAM(s) Oral every 6 hours PRN Mild Pain (1 - 3)  ALPRAZolam 0.25 milliGRAM(s) Oral at bedtime PRN anxiety  morphine  - Injectable 2 milliGRAM(s) IV Push every 6 hours PRN Severe Pain (7 - 10)          Vital Signs Last 24 Hrs  T(C): 35.9 (16 Jun 2018 14:41), Max: 36.5 (15 Jeferson 2018 21:14)  T(F): 96.6 (16 Jun 2018 14:41), Max: 97.7 (15 Jeferson 2018 21:14)  HR: 67 (16 Jun 2018 14:41) (67 - 85)  BP: 111/60 (16 Jun 2018 14:41) (111/60 - 142/71)  BP(mean): --  RR: 20 (16 Jun 2018 14:41) (20 - 22)  SpO2: --  CAPILLARY BLOOD GLUCOSE        I&O's Summary      Physical Exam:    -     General : lying in bed, NAD    -      Cardiac: Regular rate and rhythm    -      Pulm: minimal bilateral basilar crackles    -      GI: soft non tender    -      Musculoskeletal: no edema    -      Neuro: AO x 3, non focal        Labs:                        8.6    14.24 )-----------( 382      ( 16 Jun 2018 05:56 )             27.5             06-16    134<L>  |  98  |  54<H>  ----------------------------<  117<H>  4.6   |  19  |  1.6<H>    Ca    8.6      16 Jun 2018 05:56                CARDIAC MARKERS ( 16 Jun 2018 10:23 )  x     / x     / 31 U/L / x     / x                  Imaging:    ECG:

## 2018-06-16 NOTE — PROGRESS NOTE ADULT - ASSESSMENT
Patient is a 85 year old female with pmh of CKD stage 3, thoracic aortic aneurysm, a fib, dvt, htn, hld, dementia who presented with chest pain and right arm parasthesia    ·	[place] on CKD (baseline creat ~1.4 mg%) most likely secondary to hypotensive episode and cardiorenal / mild hyperkalemia   # creatinine stable  # please document UO  #continue low dose lasix  # ? on labetalol 50 q 6, BP on the low side, would d/c  #AGMA ? due to renal failure, start sodium bicarbonate po q 8  # low K diet  # check iron stores, K/L ratio and IF   #check bladder scan for PVR  # will follow

## 2018-06-16 NOTE — PROGRESS NOTE ADULT - SUBJECTIVE AND OBJECTIVE BOX
TIMUR SCAR  85y  Female    Patient is a 85y old  Female who presents with a chief complaint of R. Chest pain, jaw pain, and R. arm paresthesias (08 Jun 2018 10:16)      INTERVAL HPI/OVERNIGHT EVENTS: None    T(C): 36.4 (06-16-18 @ 00:00), Max: 36.5 (06-15-18 @ 21:14)  HR: 75 (06-16-18 @ 00:00) (61 - 75)  BP: 125/62 (06-16-18 @ 00:00) (95/58 - 142/71)  RR: 22 (06-16-18 @ 00:00) (20 - 22)  SpO2: 92% (06-15-18 @ 11:15) (88% - 95%)  Wt(kg): --Vital Signs Last 24 Hrs  T(C): 36.4 (16 Jun 2018 00:00), Max: 36.5 (15 Jeferson 2018 21:14)  T(F): 97.6 (16 Jun 2018 00:00), Max: 97.7 (15 Jeferson 2018 21:14)  HR: 75 (16 Jun 2018 00:00) (61 - 75)  BP: 125/62 (16 Jun 2018 00:00) (95/58 - 142/71)  BP(mean): --  RR: 22 (16 Jun 2018 00:00) (20 - 22)  SpO2: 92% (15 Jeferson 2018 11:15) (88% - 95%)    PHYSICAL EXAM:  GENERAL: NAD, well-groomed, well-developed  HEAD:  Atraumatic, Normocephalic  NECK: Supple, No JVD, Normal thyroid  NERVOUS SYSTEM:  Alert & Oriented X3, Good concentration; Motor Strength 5/5 B/L upper and lower extremities; DTRs 2+ intact and symmetric  CHEST/LUNG: Clear to percussion bilaterally; No rales, rhonchi, wheezing, or rubs  HEART: Regular rate and rhythm; No murmurs, rubs, or gallops  ABDOMEN: Soft, Nontender, Nondistended; Bowel sounds present  EXTREMITIES:  2+ Peripheral Pulses, No clubbing, cyanosis, or edema    Consultant(s) Notes Reviewed:  [x ] YES  [ ] NO    Discussed with Consultants/Other Providers/Residents [ x] YES     LABS                         8.3    11.78 )-----------( 355      ( 15 Jeferson 2018 06:53 )             26.3     06-15    133<L>  |  97<L>  |  54<H>  ----------------------------<  109<H>  5.0   |  18  |  1.8<H>    Ca    8.5      15 Jeferson 2018 06:53              CAPILLARY BLOOD GLUCOSE            RADIOLOGY & ADDITIONAL TESTS:    Imaging Personally Reviewed:  [x ] YES  [ ] NO    MEDICATIONS  (STANDING):  atorvastatin 10 milliGRAM(s) Oral at bedtime  cefTRIAXone   IVPB 1 Gram(s) IV Intermittent <User Schedule>  deplin 15 milliGRAM(s) 15 milliGRAM(s) Oral daily  docusate sodium 100 milliGRAM(s) Oral two times a day  folic acid 1 milliGRAM(s) Oral daily  furosemide    Tablet 20 milliGRAM(s) Oral daily  furosemide   Injectable 20 milliGRAM(s) IV Push <User Schedule>  labetalol 50 milliGRAM(s) Oral every 6 hours  lamoTRIgine 100 milliGRAM(s) Oral daily  memantine 5 milliGRAM(s) Oral daily  memantine 10 milliGRAM(s) Oral at bedtime  morphine  - Injectable 1 milliGRAM(s) IV Push once  senna 2 Tablet(s) Oral daily    MEDICATIONS  (PRN):  acetaminophen   Tablet. 650 milliGRAM(s) Oral every 6 hours PRN Mild Pain (1 - 3)  ALPRAZolam 0.25 milliGRAM(s) Oral at bedtime PRN anxiety  morphine  - Injectable 2 milliGRAM(s) IV Push every 6 hours PRN Severe Pain (7 - 10)      HEALTH ISSUES - PROBLEM Dx:  Dyslipidemia: Dyslipidemia  Dementia: Dementia  Hypertension, unspecified type: Hypertension, unspecified type  History of deep vein thrombosis: History of deep vein thrombosis  Ascending aortic dissection: Ascending aortic dissection

## 2018-06-17 LAB
ALBUMIN SERPL ELPH-MCNC: 2.9 G/DL — LOW (ref 3.5–5.2)
ALP SERPL-CCNC: 231 U/L — HIGH (ref 30–115)
ALT FLD-CCNC: 24 U/L — SIGNIFICANT CHANGE UP (ref 0–41)
ANION GAP SERPL CALC-SCNC: 21 MMOL/L — HIGH (ref 7–14)
AST SERPL-CCNC: 25 U/L — SIGNIFICANT CHANGE UP (ref 0–41)
BILIRUB DIRECT SERPL-MCNC: 0.3 MG/DL — HIGH (ref 0–0.2)
BILIRUB INDIRECT FLD-MCNC: 0.2 MG/DL — SIGNIFICANT CHANGE UP (ref 0.2–1.2)
BILIRUB SERPL-MCNC: 0.5 MG/DL — SIGNIFICANT CHANGE UP (ref 0.2–1.2)
BUN SERPL-MCNC: 62 MG/DL — CRITICAL HIGH (ref 10–20)
CALCIUM SERPL-MCNC: 8.9 MG/DL — SIGNIFICANT CHANGE UP (ref 8.5–10.1)
CHLORIDE SERPL-SCNC: 97 MMOL/L — LOW (ref 98–110)
CO2 SERPL-SCNC: 19 MMOL/L — SIGNIFICANT CHANGE UP (ref 17–32)
CREAT SERPL-MCNC: 1.6 MG/DL — HIGH (ref 0.7–1.5)
GLUCOSE SERPL-MCNC: 106 MG/DL — HIGH (ref 70–99)
HCT VFR BLD CALC: 26.7 % — LOW (ref 37–47)
HGB BLD-MCNC: 8.4 G/DL — LOW (ref 12–16)
MCHC RBC-ENTMCNC: 27.9 PG — SIGNIFICANT CHANGE UP (ref 27–31)
MCHC RBC-ENTMCNC: 31.5 G/DL — LOW (ref 32–37)
MCV RBC AUTO: 88.7 FL — SIGNIFICANT CHANGE UP (ref 81–99)
NRBC # BLD: 0 /100 WBCS — SIGNIFICANT CHANGE UP (ref 0–0)
PLATELET # BLD AUTO: 372 K/UL — SIGNIFICANT CHANGE UP (ref 130–400)
POTASSIUM SERPL-MCNC: 4.5 MMOL/L — SIGNIFICANT CHANGE UP (ref 3.5–5)
POTASSIUM SERPL-SCNC: 4.5 MMOL/L — SIGNIFICANT CHANGE UP (ref 3.5–5)
PROT SERPL-MCNC: 5.8 G/DL — LOW (ref 6–8)
RBC # BLD: 3.01 M/UL — LOW (ref 4.2–5.4)
RBC # FLD: 15.1 % — HIGH (ref 11.5–14.5)
SODIUM SERPL-SCNC: 137 MMOL/L — SIGNIFICANT CHANGE UP (ref 135–146)
WBC # BLD: 11.87 K/UL — HIGH (ref 4.8–10.8)
WBC # FLD AUTO: 11.87 K/UL — HIGH (ref 4.8–10.8)

## 2018-06-17 RX ADMIN — MEMANTINE HYDROCHLORIDE 10 MILLIGRAM(S): 10 TABLET ORAL at 21:38

## 2018-06-17 RX ADMIN — LAMOTRIGINE 100 MILLIGRAM(S): 25 TABLET, ORALLY DISINTEGRATING ORAL at 11:48

## 2018-06-17 RX ADMIN — Medication 50 MILLIGRAM(S): at 18:25

## 2018-06-17 RX ADMIN — Medication 650 MILLIGRAM(S): at 05:30

## 2018-06-17 RX ADMIN — Medication 100 MILLIGRAM(S): at 18:25

## 2018-06-17 RX ADMIN — Medication 100 MILLIGRAM(S): at 05:30

## 2018-06-17 RX ADMIN — Medication 1 MILLIGRAM(S): at 11:47

## 2018-06-17 RX ADMIN — Medication 650 MILLIGRAM(S): at 03:38

## 2018-06-17 RX ADMIN — Medication 20 MILLIGRAM(S): at 05:30

## 2018-06-17 RX ADMIN — Medication 650 MILLIGRAM(S): at 21:38

## 2018-06-17 RX ADMIN — Medication 20 MILLIGRAM(S): at 18:25

## 2018-06-17 RX ADMIN — CEFTRIAXONE 100 GRAM(S): 500 INJECTION, POWDER, FOR SOLUTION INTRAMUSCULAR; INTRAVENOUS at 18:25

## 2018-06-17 RX ADMIN — SENNA PLUS 2 TABLET(S): 8.6 TABLET ORAL at 11:48

## 2018-06-17 RX ADMIN — Medication 50 MILLIGRAM(S): at 11:47

## 2018-06-17 RX ADMIN — MEMANTINE HYDROCHLORIDE 5 MILLIGRAM(S): 10 TABLET ORAL at 11:48

## 2018-06-17 RX ADMIN — Medication 650 MILLIGRAM(S): at 15:04

## 2018-06-17 NOTE — PROVIDER CONTACT NOTE (OTHER) - REASON
Family members; specifically Daughter "Camelia" have multiple questions and requests re: rectal suppositoryfor reports of no BM since 06/10; questioning NA BIcarb order/ requesting CXR

## 2018-06-17 NOTE — PROVIDER CONTACT NOTE (OTHER) - RECOMMENDATIONS
wait until Am to re-assess if pt has BM thru night/ if not then stool stimulant can be ordered at request of pt's daughter Camelia

## 2018-06-17 NOTE — PROGRESS NOTE ADULT - SUBJECTIVE AND OBJECTIVE BOX
TIMUR SCAR  85y  Female    Patient is a 85y old  Female who presents with a chief complaint of R. Chest pain, jaw pain, and R. arm paresthesias (08 Jun 2018 10:16)      INTERVAL HPI/OVERNIGHT EVENTS: None    T(C): 37 (06-16-18 @ 20:20), Max: 37 (06-16-18 @ 20:20)  HR: 70 (06-16-18 @ 23:01) (67 - 85)  BP: 130/62 (06-16-18 @ 23:01) (111/60 - 136/63)  RR: 24 (06-16-18 @ 20:20) (20 - 24)  SpO2: --  Wt(kg): --Vital Signs Last 24 Hrs  T(C): 37 (16 Jun 2018 20:20), Max: 37 (16 Jun 2018 20:20)  T(F): 98.6 (16 Jun 2018 20:20), Max: 98.6 (16 Jun 2018 20:20)  HR: 70 (16 Jun 2018 23:01) (67 - 85)  BP: 130/62 (16 Jun 2018 23:01) (111/60 - 136/63)  BP(mean): --  RR: 24 (16 Jun 2018 20:20) (20 - 24)  SpO2: --    PHYSICAL EXAM:  GENERAL: NAD, well-groomed, well-developed  HEAD:  Atraumatic, Normocephalic  NECK: Supple, No JVD, Normal thyroid  NERVOUS SYSTEM:  Alert & Oriented X3, Good concentration; Motor Strength 5/5 B/L upper and lower extremities; DTRs 2+ intact and symmetric  CHEST/LUNG: Clear to percussion bilaterally; No rales, rhonchi, wheezing, or rubs  HEART: Regular rate and rhythm; No murmurs, rubs, or gallops  ABDOMEN: Soft, Nontender, Nondistended; Bowel sounds present  EXTREMITIES:  2+ Peripheral Pulses, No clubbing, cyanosis, or edema    Consultant(s) Notes Reviewed:  [x ] YES  [ ] NO    Discussed with Consultants/Other Providers/Residents [ x] YES     LABS                         8.6    14.24 )-----------( 382      ( 16 Jun 2018 05:56 )             27.5     06-16    134<L>  |  98  |  54<H>  ----------------------------<  117<H>  4.6   |  19  |  1.6<H>    Ca    8.6      16 Jun 2018 05:56              CAPILLARY BLOOD GLUCOSE            RADIOLOGY & ADDITIONAL TESTS:    Imaging Personally Reviewed:  [x ] YES  [ ] NO    MEDICATIONS  (STANDING):  atorvastatin 10 milliGRAM(s) Oral at bedtime  cefTRIAXone   IVPB 1 Gram(s) IV Intermittent <User Schedule>  deplin 15 milliGRAM(s) 15 milliGRAM(s) Oral daily  docusate sodium 100 milliGRAM(s) Oral two times a day  folic acid 1 milliGRAM(s) Oral daily  furosemide    Tablet 20 milliGRAM(s) Oral daily  furosemide   Injectable 20 milliGRAM(s) IV Push <User Schedule>  labetalol 50 milliGRAM(s) Oral every 6 hours  lamoTRIgine 100 milliGRAM(s) Oral daily  memantine 5 milliGRAM(s) Oral daily  memantine 10 milliGRAM(s) Oral at bedtime  senna 2 Tablet(s) Oral daily  sodium bicarbonate 650 milliGRAM(s) Oral three times a day    MEDICATIONS  (PRN):  acetaminophen   Tablet. 650 milliGRAM(s) Oral every 6 hours PRN Mild Pain (1 - 3)  ALPRAZolam 0.25 milliGRAM(s) Oral at bedtime PRN anxiety  morphine  - Injectable 2 milliGRAM(s) IV Push every 6 hours PRN Severe Pain (7 - 10)      HEALTH ISSUES - PROBLEM Dx:  Dyslipidemia: Dyslipidemia  Dementia: Dementia  Hypertension, unspecified type: Hypertension, unspecified type  History of deep vein thrombosis: History of deep vein thrombosis  Ascending aortic dissection: Ascending aortic dissection

## 2018-06-18 LAB
ANION GAP SERPL CALC-SCNC: 17 MMOL/L — HIGH (ref 7–14)
BUN SERPL-MCNC: 58 MG/DL — HIGH (ref 10–20)
CALCIUM SERPL-MCNC: 9 MG/DL — SIGNIFICANT CHANGE UP (ref 8.5–10.1)
CHLORIDE SERPL-SCNC: 99 MMOL/L — SIGNIFICANT CHANGE UP (ref 98–110)
CO2 SERPL-SCNC: 23 MMOL/L — SIGNIFICANT CHANGE UP (ref 17–32)
CREAT SERPL-MCNC: 1.5 MG/DL — SIGNIFICANT CHANGE UP (ref 0.7–1.5)
GLUCOSE SERPL-MCNC: 110 MG/DL — HIGH (ref 70–99)
POTASSIUM SERPL-MCNC: 4.1 MMOL/L — SIGNIFICANT CHANGE UP (ref 3.5–5)
POTASSIUM SERPL-SCNC: 4.1 MMOL/L — SIGNIFICANT CHANGE UP (ref 3.5–5)
SODIUM SERPL-SCNC: 139 MMOL/L — SIGNIFICANT CHANGE UP (ref 135–146)

## 2018-06-18 RX ADMIN — SENNA PLUS 2 TABLET(S): 8.6 TABLET ORAL at 11:41

## 2018-06-18 RX ADMIN — MEMANTINE HYDROCHLORIDE 5 MILLIGRAM(S): 10 TABLET ORAL at 11:41

## 2018-06-18 RX ADMIN — Medication 650 MILLIGRAM(S): at 14:18

## 2018-06-18 RX ADMIN — Medication 1 MILLIGRAM(S): at 11:41

## 2018-06-18 RX ADMIN — Medication 50 MILLIGRAM(S): at 11:41

## 2018-06-18 RX ADMIN — LAMOTRIGINE 100 MILLIGRAM(S): 25 TABLET, ORALLY DISINTEGRATING ORAL at 11:41

## 2018-06-18 RX ADMIN — Medication 100 MILLIGRAM(S): at 18:10

## 2018-06-18 RX ADMIN — Medication 50 MILLIGRAM(S): at 18:10

## 2018-06-18 RX ADMIN — Medication 20 MILLIGRAM(S): at 05:25

## 2018-06-18 RX ADMIN — Medication 650 MILLIGRAM(S): at 21:20

## 2018-06-18 RX ADMIN — Medication 50 MILLIGRAM(S): at 23:33

## 2018-06-18 RX ADMIN — MEMANTINE HYDROCHLORIDE 10 MILLIGRAM(S): 10 TABLET ORAL at 21:20

## 2018-06-18 RX ADMIN — Medication 100 MILLIGRAM(S): at 05:25

## 2018-06-18 RX ADMIN — Medication 650 MILLIGRAM(S): at 05:25

## 2018-06-18 NOTE — PROGRESS NOTE ADULT - ASSESSMENT
86 y/o F with PMH of aortic aneurysm (diagnosed February 2017), DVT (diagnosed March, 2017) on eliquis, HLD, HTN, and dementia presented for acute onset of right chest pain, jaw pain, and R. arm paresthesias found to have worsening of aortic dissection    #Ascending aortic dissection (type A) -  - Medical management, on Labetalol  - BP is well controlled now  - will hold for SBP < 100    #Hypertension -   - Continue with Labetalol 50 mg Q6    #Gradual decrease in hemoglobin/ Normocytic anemia:  - status post transfusion 1 unit on 6/14, has been stable since  - No evidence of active bleeding    #Leukocytosis/ urine analysis s/o UTI:  - completed Rocephin  - Leukocytosis resolving    #[place] on CKD/ Hyperkalemia:  - Bladder scan done post void showed no residual urine  - Urine lytes likely Prerenal vs cardiorenal  - Anion gap metabolic acidosis: c/w PO bicarb as per Nephro    #Congestive heart failure:  - Patient hypoxic to 88% at rest in room air, will likely need home O2 on discharge  - Despite IV and PO diuresis with Lasix, patient desaturates to:    - Room air pulse ox. at rest:      - Room air pulse ox while ambulating:     - Pulse ox while ambulating on    Patient will require home O2 for discharge.  Patient is aware and agreeable to home O2.  Patient is in a chronic stable state of COPD.      Patient treated for pneumonia: Y/N    Pneumonia treated and resolved: Y/N    #Dementia  - c/w Memantine    #Dyslipidemia  - Dose of Lipitor reduced to 10 mg, home dose.    #Disposition: Home with home care likely tomorrow  #Code status: DNR/ DNI 84 y/o F with PMH of aortic aneurysm (diagnosed February 2017), DVT (diagnosed March, 2017) on eliquis, HLD, HTN, and dementia presented for acute onset of right chest pain, jaw pain, and R. arm paresthesias found to have worsening of aortic dissection    #Ascending aortic dissection (type A) -  - Medical management, on Labetalol  - BP is well controlled now  - will hold for SBP < 100    #Hypertension -   - Continue with Labetalol 50 mg Q6    #Gradual decrease in hemoglobin/ Normocytic anemia:  - status post transfusion 1 unit on 6/14, has been stable since  - No evidence of active bleeding    #Leukocytosis/ urine analysis s/o UTI:  - completed Rocephin  - Leukocytosis resolving    #[place] on CKD/ Hyperkalemia:  - Bladder scan done post void showed no residual urine  - Urine lytes likely Prerenal vs cardiorenal  - Anion gap metabolic acidosis: c/w PO bicarb as per Nephro    #Congestive heart failure:  - Despite IV and PO diuresis with Lasix, patient desaturates to:    - Room air pulse ox. at rest: 87%    - Pulse ox at rest on 2L O2 via NC: 92%    - Room air pulse ox while ambulating: pt could not be ambulated    - Pulse ox while ambulating: pt could not be ambulated    - Patient will require home O2 for discharge.  Patient is aware and agreeable to home O2.  Patient is in a chronic stable state of CHF.     #Dementia  - c/w Memantine    #Dyslipidemia  - Dose of Lipitor reduced to 10 mg, home dose.    #Disposition: Home with home care likely tomorrow  #Code status: DNR/ DNI

## 2018-06-18 NOTE — PROGRESS NOTE ADULT - SUBJECTIVE AND OBJECTIVE BOX
seen/examined chart reviewed, clinically without change, c/o feeling weak.    no jvd  rhonchi   rrr  soft nt nd + bs  no e/c/c

## 2018-06-18 NOTE — PROVIDER CONTACT NOTE (OTHER) - ACTION/TREATMENT ORDERED:
med held as ordered
Dr Ariza comes to floor/ examines pt and speaks with family members at BS/ meds discussed; CXR for Am and as requested by daughter Camelia, no stool stimulant (ie suppository or Miralax ) until re-assess

## 2018-06-18 NOTE — PROGRESS NOTE ADULT - ASSESSMENT
86 y/o F with PMH of aortic aneurysm      1) Ascending aortic dissection (type A)  -bp control       2) anemia- s/p transfusion 1 unit prbc  -stable cbc  -cont iron qd    3)disposition  - family wants to take pt home, cont pt at home, stable for discharge to home todau    4) code status, DNR signed

## 2018-06-18 NOTE — PROGRESS NOTE ADULT - ASSESSMENT
Patient is a 85 year old female with pmh of CKD stage 3, thoracic aortic aneurysm, a fib, dvt, htn, hld, dementia who presented with chest pain and right arm parasthesia    ·	[place] on CKD (baseline creat ~1.4 mg%) most likely secondary to hypotensive episode and cardiorenal / mild hyperkalemia   # creatinine stable  # please document UO  # on low dose lasix, clinically euvolemic   #d/c labetalol   #AGMA ? due to renal failure, on sodium bicarbonate po q 8  # check iron stores, K/L ratio and IF   #check bladder scan for PVR  # will sign off , recall as needed

## 2018-06-18 NOTE — PROGRESS NOTE ADULT - SUBJECTIVE AND OBJECTIVE BOX
Patient is a 85y old  Female who presents with a chief complaint of R. Chest pain, jaw pain, and R. arm paresthesias (08 Jun 2018 10:16)   Patient seen and examined at bedside. She has no acute complaints, except for some mild fatigue.    Overnight Events:    PAST MEDICAL & SURGICAL HISTORY:  Dyslipidemia  Dementia  Thoracic aortic dissection  History of deep vein thrombosis  Hypertension, unspecified type  Status post hip surgery  H/O rotator cuff tear  H/O partial thyroidectomy      MEDICATIONS  (STANDING):  atorvastatin 10 milliGRAM(s) Oral at bedtime  cefTRIAXone   IVPB 1 Gram(s) IV Intermittent <User Schedule>  deplin 15 milliGRAM(s) 15 milliGRAM(s) Oral daily  docusate sodium 100 milliGRAM(s) Oral two times a day  folic acid 1 milliGRAM(s) Oral daily  furosemide    Tablet 20 milliGRAM(s) Oral daily  furosemide   Injectable 20 milliGRAM(s) IV Push <User Schedule>  labetalol 50 milliGRAM(s) Oral every 6 hours  lamoTRIgine 100 milliGRAM(s) Oral daily  memantine 5 milliGRAM(s) Oral daily  memantine 10 milliGRAM(s) Oral at bedtime  senna 2 Tablet(s) Oral daily  sodium bicarbonate 650 milliGRAM(s) Oral three times a day    MEDICATIONS  (PRN):  acetaminophen   Tablet. 650 milliGRAM(s) Oral every 6 hours PRN Mild Pain (1 - 3)  morphine  - Injectable 2 milliGRAM(s) IV Push every 6 hours PRN Severe Pain (7 - 10)      Vital Signs Last 24 Hrs  T(C): 35.7 (18 Jun 2018 12:45), Max: 36.9 (18 Jun 2018 04:59)  T(F): 96.3 (18 Jun 2018 12:45), Max: 98.4 (18 Jun 2018 04:59)  HR: 86 (18 Jun 2018 12:45) (72 - 89)  BP: 149/92 (18 Jun 2018 12:45) (102/72 - 149/92)  BP(mean): --  RR: 20 (18 Jun 2018 12:45) (18 - 20)  SpO2: 89% (18 Jun 2018 11:08) (89% - 94%)  CAPILLARY BLOOD GLUCOSE        I&O's Summary      Physical Exam:    GENERAL APPEARANCE:  alert and cooperative, and appears to be in no acute distress.  HEENT: Head Normocephalic/Atraumatic  NECK: Neck supple, non-tender without lymphadenopathy, masses or thyromegaly.  CARDIOVASCULAR: RRR, Normal S1 and S2.   PULMONARY: Lungs CTA B/L, no wheezing  GI: Positive bowel sounds. Abdomen soft, nondistended, nontender. No guarding or rebound. No HSM.  MUSCULOSKELETAL: No joint erythema or tenderness.   EXTREMITIES: No edema. Peripheral pulses intact.   NEUROLOGICAL: AAOx3. No focal deficits.       Labs:                        8.4    11.87 )-----------( 372      ( 17 Jun 2018 06:33 )             26.7             06-18    139  |  99  |  58<H>  ----------------------------<  110<H>  4.1   |  23  |  1.5    Ca    9.0      18 Jun 2018 07:40    TPro  5.8<L>  /  Alb  2.9<L>  /  TBili  0.5  /  DBili  0.3<H>  /  AST  25  /  ALT  24  /  AlkPhos  231<H>  06-17    LIVER FUNCTIONS - ( 17 Jun 2018 06:33 )  Alb: 2.9 g/dL / Pro: 5.8 g/dL / ALK PHOS: 231 U/L / ALT: 24 U/L / AST: 25 U/L / GGT: x

## 2018-06-18 NOTE — CHART NOTE - NSCHARTNOTEFT_GEN_A_CORE
Registered Dietitian At Risk Follow-Up     Patient Profile Reviewed                           Yes [x]   No []     Nutrition History Previously Obtained        Yes [x]  No []       Pertinent Subjective Information: Spoke to pt's daughter at bedside who reports that pt is still consuming ~50% of meal trays. She states that they got Ensure only once and because of personal request, daughter is requesting pt to receive it BID. Will communicate with LIP to activate pending supplement order.      Pertinent Medical Interventions: DC planning home with home care once home oxygen stable.    Diet order: DASH/TLC, No conc K+      Anthropometrics:  - Ht. 66"  - Wt. no new wts      Pertinent Lab Data: Reviewed (6/18): BUN 58, Glu 110     Pertinent Meds: sodium bicarb, lipitor, colace, senna, lasix, labetalol, folic acid      Physical Findings:  - Appearance: alert  - GI function: no GI distress noted, LBM 6/18  - Oral/Mouth cavity: denies difficulty swallowing/chewing   - Skin: Jevon 16      Nutrition Requirements  Weight Used: CBW is 71.3 (on lasix), IBW is 45.4kg     Estimated Energy Needs: 1587-5013 kcal/day (MSJ x 1.2-1.3)--remains as of 6/8      Estimated Protein Needs: 50-58 g/day (1.1-1.3 g/kg of IBW)--remains as of 6/8      Estimated Fluid Needs: per LIP      Nutrient Intake: not meeting kcal/pro needs at this time d/t 50% po intake     Previous Nutrition Diagnostic: Inadequate oral intake  (ongoing)    Nutrition Intervention: meals and snacks, medical food supplements     Recommendations:   1. Activate pending diet order with oral supplement     Goal/Expected Outcome: Pt to consume >50% of meals, snacks, and supplements within 3 days.      Indicator/Monitoring: RD to monitor diet order, energy intake, renal/glucose profile, nutrition focused physical findings.

## 2018-06-18 NOTE — PROGRESS NOTE ADULT - SUBJECTIVE AND OBJECTIVE BOX
seen and examined  no new complaints  lying comfortable       Standing Inpatient Medications  atorvastatin 10 milliGRAM(s) Oral at bedtime  cefTRIAXone   IVPB 1 Gram(s) IV Intermittent <User Schedule>  deplin 15 milliGRAM(s) 15 milliGRAM(s) Oral daily  docusate sodium 100 milliGRAM(s) Oral two times a day  folic acid 1 milliGRAM(s) Oral daily  furosemide    Tablet 20 milliGRAM(s) Oral daily  furosemide   Injectable 20 milliGRAM(s) IV Push <User Schedule>  labetalol 50 milliGRAM(s) Oral every 6 hours  lamoTRIgine 100 milliGRAM(s) Oral daily  memantine 5 milliGRAM(s) Oral daily  memantine 10 milliGRAM(s) Oral at bedtime  senna 2 Tablet(s) Oral daily  sodium bicarbonate 650 milliGRAM(s) Oral three times a day              VITALS/PHYSICAL EXAM  --------------------------------------------------------------------------------  T(C): 36.9 (06-18-18 @ 04:59), Max: 36.9 (06-18-18 @ 04:59)  HR: 81 (06-18-18 @ 04:59) (70 - 81)  BP: 102/72 (06-18-18 @ 04:59) (102/72 - 137/67)  RR: 18 (06-18-18 @ 04:59) (18 - 20)  SpO2: 94% (06-17-18 @ 20:46) (94% - 94%)  Wt(kg): --        Physical Exam:  	Gen: NAD  	Pulm: decrease BS  B/L  	CV: S1S2; no rub  	Abd: +distended  	LE: no edema      LABS/STUDIES  --------------------------------------------------------------------------------              8.4    11.87 >-----------<  372      [06-17-18 @ 06:33]              26.7     137  |  97  |  62  ----------------------------<  106      [06-17-18 @ 06:33]  4.5   |  19  |  1.6        Ca     8.9     [06-17-18 @ 06:33]    TPro  5.8  /  Alb  2.9  /  TBili  0.5  /  DBili  0.3  /  AST  25  /  ALT  24  /  AlkPhos  231  [06-17-18 @ 06:33]        CK 31      [06-16-18 @ 10:23]    Creatinine Trend:  SCr 1.6 [06-17 @ 06:33]  SCr 1.6 [06-16 @ 05:56]  SCr 1.8 [06-15 @ 06:53]  SCr 1.7 [06-14 @ 08:23]  SCr 1.7 [06-13 @ 11:18]      Lipid: chol 120, TG 91, HDL 40, LDL 60      [06-02-18 @ 04:45]

## 2018-06-19 LAB
ANION GAP SERPL CALC-SCNC: 17 MMOL/L — HIGH (ref 7–14)
BASOPHILS # BLD AUTO: 0.02 K/UL — SIGNIFICANT CHANGE UP (ref 0–0.2)
BASOPHILS NFR BLD AUTO: 0.2 % — SIGNIFICANT CHANGE UP (ref 0–1)
BUN SERPL-MCNC: 53 MG/DL — HIGH (ref 10–20)
CALCIUM SERPL-MCNC: 8.7 MG/DL — SIGNIFICANT CHANGE UP (ref 8.5–10.1)
CHLORIDE SERPL-SCNC: 99 MMOL/L — SIGNIFICANT CHANGE UP (ref 98–110)
CO2 SERPL-SCNC: 24 MMOL/L — SIGNIFICANT CHANGE UP (ref 17–32)
CREAT SERPL-MCNC: 1.2 MG/DL — SIGNIFICANT CHANGE UP (ref 0.7–1.5)
EOSINOPHIL # BLD AUTO: 0.15 K/UL — SIGNIFICANT CHANGE UP (ref 0–0.7)
EOSINOPHIL NFR BLD AUTO: 1.3 % — SIGNIFICANT CHANGE UP (ref 0–8)
GLUCOSE SERPL-MCNC: 113 MG/DL — HIGH (ref 70–99)
HCT VFR BLD CALC: 27.2 % — LOW (ref 37–47)
HGB BLD-MCNC: 8.5 G/DL — LOW (ref 12–16)
IMM GRANULOCYTES NFR BLD AUTO: 1.2 % — HIGH (ref 0.1–0.3)
LYMPHOCYTES # BLD AUTO: 0.57 K/UL — LOW (ref 1.2–3.4)
LYMPHOCYTES # BLD AUTO: 5.1 % — LOW (ref 20.5–51.1)
MCHC RBC-ENTMCNC: 27.8 PG — SIGNIFICANT CHANGE UP (ref 27–31)
MCHC RBC-ENTMCNC: 31.3 G/DL — LOW (ref 32–37)
MCV RBC AUTO: 88.9 FL — SIGNIFICANT CHANGE UP (ref 81–99)
MONOCYTES # BLD AUTO: 1.44 K/UL — HIGH (ref 0.1–0.6)
MONOCYTES NFR BLD AUTO: 12.9 % — HIGH (ref 1.7–9.3)
NEUTROPHILS # BLD AUTO: 8.83 K/UL — HIGH (ref 1.4–6.5)
NEUTROPHILS NFR BLD AUTO: 79.3 % — HIGH (ref 42.2–75.2)
NRBC # BLD: 0 /100 WBCS — SIGNIFICANT CHANGE UP (ref 0–0)
PLATELET # BLD AUTO: 320 K/UL — SIGNIFICANT CHANGE UP (ref 130–400)
POTASSIUM SERPL-MCNC: 3.7 MMOL/L — SIGNIFICANT CHANGE UP (ref 3.5–5)
POTASSIUM SERPL-SCNC: 3.7 MMOL/L — SIGNIFICANT CHANGE UP (ref 3.5–5)
RBC # BLD: 3.06 M/UL — LOW (ref 4.2–5.4)
RBC # FLD: 14.8 % — HIGH (ref 11.5–14.5)
SODIUM SERPL-SCNC: 140 MMOL/L — SIGNIFICANT CHANGE UP (ref 135–146)
WBC # BLD: 11.14 K/UL — HIGH (ref 4.8–10.8)
WBC # FLD AUTO: 11.14 K/UL — HIGH (ref 4.8–10.8)

## 2018-06-19 RX ORDER — ATORVASTATIN CALCIUM 80 MG/1
1 TABLET, FILM COATED ORAL
Qty: 0 | Refills: 0 | COMMUNITY

## 2018-06-19 RX ADMIN — Medication 20 MILLIGRAM(S): at 05:38

## 2018-06-19 RX ADMIN — Medication 650 MILLIGRAM(S): at 05:37

## 2018-06-19 RX ADMIN — Medication 50 MILLIGRAM(S): at 11:40

## 2018-06-19 RX ADMIN — Medication 650 MILLIGRAM(S): at 14:02

## 2018-06-19 RX ADMIN — MEMANTINE HYDROCHLORIDE 10 MILLIGRAM(S): 10 TABLET ORAL at 21:50

## 2018-06-19 RX ADMIN — MEMANTINE HYDROCHLORIDE 5 MILLIGRAM(S): 10 TABLET ORAL at 11:41

## 2018-06-19 RX ADMIN — Medication 50 MILLIGRAM(S): at 05:38

## 2018-06-19 RX ADMIN — Medication 50 MILLIGRAM(S): at 17:29

## 2018-06-19 RX ADMIN — Medication 650 MILLIGRAM(S): at 01:47

## 2018-06-19 RX ADMIN — LAMOTRIGINE 100 MILLIGRAM(S): 25 TABLET, ORALLY DISINTEGRATING ORAL at 11:41

## 2018-06-19 RX ADMIN — Medication 100 MILLIGRAM(S): at 17:29

## 2018-06-19 RX ADMIN — Medication 650 MILLIGRAM(S): at 21:50

## 2018-06-19 RX ADMIN — Medication 100 MILLIGRAM(S): at 05:38

## 2018-06-19 RX ADMIN — SENNA PLUS 2 TABLET(S): 8.6 TABLET ORAL at 11:41

## 2018-06-19 RX ADMIN — Medication 1 MILLIGRAM(S): at 11:40

## 2018-06-19 NOTE — PROGRESS NOTE ADULT - SUBJECTIVE AND OBJECTIVE BOX
seen/examined chart reviewed, clinically without change, c/o feeling very weak. family requesting bedside pt prior to being discharged    no jvd  rhonchi   rrr  soft nt nd + bs  no e/c/c

## 2018-06-19 NOTE — PROGRESS NOTE ADULT - ASSESSMENT
84 y/o F with PMH of aortic aneurysm      1) Ascending aortic dissection (type A)  -bp control       2) anemia- s/p transfusion 1 unit prbc  -stable cbc  -cont iron qd    3) weakness/gait ataxia  bedside pt    4)disposition  - family wants to take pt home, cont pt at home, hold discharge home, anticipate discharge thursday, family needs to arrange for home pt and home health aide    5) code status, DNR signed

## 2018-06-20 RX ADMIN — Medication 50 MILLIGRAM(S): at 05:55

## 2018-06-20 RX ADMIN — Medication 650 MILLIGRAM(S): at 14:04

## 2018-06-20 RX ADMIN — Medication 650 MILLIGRAM(S): at 11:37

## 2018-06-20 RX ADMIN — SENNA PLUS 2 TABLET(S): 8.6 TABLET ORAL at 11:13

## 2018-06-20 RX ADMIN — Medication 650 MILLIGRAM(S): at 05:54

## 2018-06-20 RX ADMIN — Medication 50 MILLIGRAM(S): at 11:13

## 2018-06-20 RX ADMIN — MEMANTINE HYDROCHLORIDE 5 MILLIGRAM(S): 10 TABLET ORAL at 11:13

## 2018-06-20 RX ADMIN — LAMOTRIGINE 100 MILLIGRAM(S): 25 TABLET, ORALLY DISINTEGRATING ORAL at 11:13

## 2018-06-20 RX ADMIN — Medication 20 MILLIGRAM(S): at 05:54

## 2018-06-20 RX ADMIN — Medication 650 MILLIGRAM(S): at 21:26

## 2018-06-20 RX ADMIN — Medication 100 MILLIGRAM(S): at 05:54

## 2018-06-20 RX ADMIN — MEMANTINE HYDROCHLORIDE 10 MILLIGRAM(S): 10 TABLET ORAL at 21:26

## 2018-06-20 RX ADMIN — Medication 50 MILLIGRAM(S): at 17:28

## 2018-06-20 RX ADMIN — Medication 100 MILLIGRAM(S): at 17:28

## 2018-06-20 RX ADMIN — Medication 1 MILLIGRAM(S): at 11:13

## 2018-06-20 NOTE — PROGRESS NOTE ADULT - SUBJECTIVE AND OBJECTIVE BOX
seen/examined chart reviewed, clinically without change, c/o feeling very weak. family requesting bedside pt prior to being discharged, she is walking 36 steps with assist    no jvd  rhonchi   rrr  soft nt nd + bs  no e/c/c

## 2018-06-20 NOTE — PROGRESS NOTE ADULT - ASSESSMENT
84 y/o F with PMH of aortic aneurysm      1) Ascending aortic dissection (type A)  -bp control      2) anemia- s/p transfusion 1 unit prbc  -stable cbc  -cont iron qd    3) weakness/gait ataxia  bedside pt    4)disposition  - family wants to take pt home, cont pt at home, anticipate discharge thursday, family needs to arrange for home pt and home health aide  discussed with house staff on rounds    5) code status, DNR signed

## 2018-06-20 NOTE — PROGRESS NOTE ADULT - NSHPATTENDINGPLANDISCUSS_GEN_ALL_CORE
pt and house staff
Daughter at BS
Daughter yesterday and Aide and NS today
Pt and NS at BS
house staff on rounds
pt and house staff on rounds
pt,  and housestaff
pt, daughter and cardio (ahilan)
pt, daughter and house staff
pt, family and housestaff

## 2018-06-21 ENCOUNTER — OUTPATIENT (OUTPATIENT)
Dept: OUTPATIENT SERVICES | Facility: HOSPITAL | Age: 83
LOS: 1 days | Discharge: HOME | End: 2018-06-21

## 2018-06-21 DIAGNOSIS — Z98.890 OTHER SPECIFIED POSTPROCEDURAL STATES: Chronic | ICD-10-CM

## 2018-06-21 DIAGNOSIS — Z87.39 PERSONAL HISTORY OF OTHER DISEASES OF THE MUSCULOSKELETAL SYSTEM AND CONNECTIVE TISSUE: Chronic | ICD-10-CM

## 2018-06-21 DIAGNOSIS — Z02.9 ENCOUNTER FOR ADMINISTRATIVE EXAMINATIONS, UNSPECIFIED: ICD-10-CM

## 2018-06-21 DIAGNOSIS — E89.0 POSTPROCEDURAL HYPOTHYROIDISM: Chronic | ICD-10-CM

## 2018-06-21 RX ADMIN — Medication 650 MILLIGRAM(S): at 12:33

## 2018-06-21 RX ADMIN — Medication 650 MILLIGRAM(S): at 21:33

## 2018-06-21 RX ADMIN — Medication 1 MILLIGRAM(S): at 12:32

## 2018-06-21 RX ADMIN — Medication 100 MILLIGRAM(S): at 06:04

## 2018-06-21 RX ADMIN — MEMANTINE HYDROCHLORIDE 10 MILLIGRAM(S): 10 TABLET ORAL at 21:33

## 2018-06-21 RX ADMIN — Medication 20 MILLIGRAM(S): at 06:04

## 2018-06-21 RX ADMIN — MEMANTINE HYDROCHLORIDE 5 MILLIGRAM(S): 10 TABLET ORAL at 12:32

## 2018-06-21 RX ADMIN — Medication 50 MILLIGRAM(S): at 18:27

## 2018-06-21 RX ADMIN — LAMOTRIGINE 100 MILLIGRAM(S): 25 TABLET, ORALLY DISINTEGRATING ORAL at 12:32

## 2018-06-21 RX ADMIN — Medication 50 MILLIGRAM(S): at 00:12

## 2018-06-21 RX ADMIN — Medication 50 MILLIGRAM(S): at 06:03

## 2018-06-21 RX ADMIN — Medication 50 MILLIGRAM(S): at 12:32

## 2018-06-21 RX ADMIN — Medication 650 MILLIGRAM(S): at 06:04

## 2018-06-21 NOTE — CHART NOTE - NSCHARTNOTEFT_GEN_A_CORE
Registered Dietitian Follow-Up     Patient Profile Reviewed                           Yes [x]   No []     Nutrition History Previously Obtained        Yes [x]  No []       Pertinent Subjective Information: Pt. resting in bed during visit. Family at bedside. per family pt. with variable PO intake at this time d/t personal preferences. C/o meal options on DASH/TLC diet. Observed pt. during breakfast- ~30% intake, pt. states she drinks some of her Ensure- observed unopened bottle at bedside. (However, no Ensure enlive order activated in EMR)     Pertinent Medical Interventions:  Ascending aortic dissection- stable. BP well controlled. Normocytic anemia- transfusion PRN.  [place] on CKD/ Hyperkalemia AGMA- bladder scan: no residual urine. Congestive heart failure pleural effusion: DC planning  home with Oxygen via NC 3 liters. DNR/DNI.     Diet order: DASH/TLC     Anthropometrics:  - Ht. 167.6cm  - Wt. 78.1kg on 6/10- no weights documented since.  - %wt change  - BMI 27.8  - IBW 125lbs     Pertinent Lab Data: no new labs documented      Pertinent Meds: Colace, Senna, Lasix, Folic acid     Physical Findings:  - Appearance: alert&oriented  - GI function: denies symptoms, last BM 6/21- multiple, soft  - Tubes:   - Oral/Mouth cavity: denies symptoms   - Skin: WDL (BS 15)      Nutrition Requirements (from RD note on 6/18)  Weight Used:     Estimated Energy Needs    Continue [x]  Adjust []   Adjusted Energy Recommendations:   kcal/day        Estimated Protein Needs    Continue [x]  Adjust []  Adjusted Protein Recommendations:   gm/day        Estimated Fluid Needs        Continue [x]  Adjust []  Adjusted Fluid Recommendations:   mL/day     Nutrient Intake: reports variable intake, depending on personal preference, ~50% at meals         [] Previous Nutrition Diagnosis: Inadequate oral intake            [x] Ongoing          [] Resolved         Nutrition Intervention: meals and snacks, medical food supplement,    Rec: continue DASH/TLC, no conc K diet order, order Ensure enlive BID     Goal/Expected Outcome: In 4 days pt. to consistently consume at least 50-75% PO and supplement      Indicator/Monitoring: diet order, energy intake, body composition, NFPF, electrolyte/renal profile. Registered Dietitian Follow-Up     Patient Profile Reviewed                           Yes [x]   No []     Nutrition History Previously Obtained        Yes [x]  No []       Pertinent Subjective Information: Pt. resting in bed during visit. Family at bedside. per family pt. with variable PO intake at this time d/t personal preferences. C/o meal options on DASH/TLC diet. Observed pt. during breakfast- ~30% intake, pt. states she drinks some of her Ensure- observed unopened bottle at bedside. (However, no Ensure enlive order activated in EMR)     Pertinent Medical Interventions:  Ascending aortic dissection- stable. BP well controlled. Normocytic anemia- transfusion PRN.  [place] on CKD/ Hyperkalemia AGMA- bladder scan: no residual urine. Congestive heart failure pleural effusion: DC planning  home with Oxygen via NC 3 liters. DNR/DNI.     Diet order: DASH/TLC     Anthropometrics:  - Ht. 167.6cm  - Wt. 78.1kg on 6/10- no weights documented since.  - %wt change  - BMI 27.8  - IBW 125lbs     Pertinent Lab Data: no new labs documented      Pertinent Meds: Colace, Senna, Lasix, Folic acid     Physical Findings:  - Appearance: alert&oriented  - GI function: denies symptoms, last BM 6/21- multiple, soft  - Tubes:   - Oral/Mouth cavity: denies symptoms   - Skin: WDL (BS 15)      Nutrition Requirements (from RD note on 6/18)  Weight Used:     Estimated Energy Needs    Continue [x]  Adjust [] 1302-1410kcal  Adjusted Energy Recommendations:   kcal/day        Estimated Protein Needs    Continue [x]  Adjust [] 50-58g  Adjusted Protein Recommendations:   gm/day        Estimated Fluid Needs        Continue [x]  Adjust [] per LIP  Adjusted Fluid Recommendations:   mL/day     Nutrient Intake: reports variable intake, depending on personal preference, ~50% at meals         [] Previous Nutrition Diagnosis: Inadequate oral intake            [x] Ongoing          [] Resolved         Nutrition Intervention: meals and snacks, medical food supplement, nutrition related medication management     Rec: continue DASH/TLC, no conc K diet order, order Ensure enlive BID, consider adjusting bowel regimen (pt. with multiple soft stools today)     Goal/Expected Outcome: In 4 days pt. to consistently consume at least 50-75% PO and supplement      Indicator/Monitoring: diet order, energy intake, body composition, NFPF, electrolyte/renal profile.

## 2018-06-21 NOTE — PROGRESS NOTE ADULT - SUBJECTIVE AND OBJECTIVE BOX
SCAR DING  85y  Female    Patient is a 85y old  Female who presents with a chief complaint of R. Chest pain, jaw pain, and R. arm paresthesias (08 Jun 2018 10:16)      INTERVAL HPI/OVERNIGHT EVENTS: none    T(C): 37.1 (06-21-18 @ 05:00), Max: 37.2 (06-20-18 @ 20:55)  HR: 71 (06-21-18 @ 05:00) (66 - 75)  BP: 144/67 (06-21-18 @ 05:00) (121/59 - 144/67)  RR: 18 (06-21-18 @ 05:00) (18 - 20)  SpO2: 94% (06-20-18 @ 20:00) (94% - 94%)  Wt(kg): --Vital Signs Last 24 Hrs  T(C): 37.1 (21 Jun 2018 05:00), Max: 37.2 (20 Jun 2018 20:55)  T(F): 98.7 (21 Jun 2018 05:00), Max: 99 (20 Jun 2018 20:55)  HR: 71 (21 Jun 2018 05:00) (66 - 75)  BP: 144/67 (21 Jun 2018 05:00) (121/59 - 144/67)  BP(mean): --  RR: 18 (21 Jun 2018 05:00) (18 - 20)  SpO2: 94% (20 Jun 2018 20:00) (94% - 94%)    PHYSICAL EXAM:  GENERAL: NAD, well-groomed, well-developed  HEAD:  Atraumatic, Normocephalic  NECK: Supple, No JVD, Normal thyroid  NERVOUS SYSTEM:  Alert & Oriented X3, Good concentration; Motor Strength 5/5 B/L upper and lower extremities; DTRs 2+ intact and symmetric  CHEST/LUNG: Clear to percussion bilaterally; No rales, rhonchi, wheezing, or rubs  HEART: Regular rate and rhythm; No murmurs, rubs, or gallops  ABDOMEN: Soft, Nontender, Nondistended; Bowel sounds present  EXTREMITIES:  2+ Peripheral Pulses, No clubbing, cyanosis, or edema    Consultant(s) Notes Reviewed:  [x ] YES  [ ] NO    Discussed with Consultants/Other Providers/Residents [ x] YES     LABS                   CAPILLARY BLOOD GLUCOSE            RADIOLOGY & ADDITIONAL TESTS:    Imaging Personally Reviewed:  [x ] YES  [ ] NO    MEDICATIONS  (STANDING):  deplin 15 milliGRAM(s) 15 milliGRAM(s) Oral daily  docusate sodium 100 milliGRAM(s) Oral two times a day  folic acid 1 milliGRAM(s) Oral daily  furosemide    Tablet 20 milliGRAM(s) Oral daily  labetalol 50 milliGRAM(s) Oral every 6 hours  lamoTRIgine 100 milliGRAM(s) Oral daily  memantine 5 milliGRAM(s) Oral daily  memantine 10 milliGRAM(s) Oral at bedtime  senna 2 Tablet(s) Oral daily  sodium bicarbonate 650 milliGRAM(s) Oral three times a day    MEDICATIONS  (PRN):  acetaminophen   Tablet. 650 milliGRAM(s) Oral every 6 hours PRN Mild Pain (1 - 3)  morphine  - Injectable 2 milliGRAM(s) IV Push every 6 hours PRN Severe Pain (7 - 10)      HEALTH ISSUES - PROBLEM Dx:  Dyslipidemia: Dyslipidemia  Dementia: Dementia  Hypertension, unspecified type: Hypertension, unspecified type  History of deep vein thrombosis: History of deep vein thrombosis  Ascending aortic dissection: Ascending aortic dissection

## 2018-06-22 VITALS
HEART RATE: 63 BPM | RESPIRATION RATE: 20 BRPM | SYSTOLIC BLOOD PRESSURE: 131 MMHG | DIASTOLIC BLOOD PRESSURE: 63 MMHG | TEMPERATURE: 98 F

## 2018-06-22 RX ORDER — FOLIC ACID 0.8 MG
1 TABLET ORAL
Qty: 0 | Refills: 0 | DISCHARGE
Start: 2018-06-22

## 2018-06-22 RX ORDER — LABETALOL HCL 100 MG
0.5 TABLET ORAL
Qty: 60 | Refills: 0
Start: 2018-06-22 | End: 2018-07-21

## 2018-06-22 RX ORDER — FUROSEMIDE 40 MG
1 TABLET ORAL
Qty: 0 | Refills: 0 | COMMUNITY

## 2018-06-22 RX ORDER — FUROSEMIDE 40 MG
1 TABLET ORAL
Qty: 0 | Refills: 0 | DISCHARGE
Start: 2018-06-22

## 2018-06-22 RX ORDER — LISINOPRIL 2.5 MG/1
1 TABLET ORAL
Qty: 0 | Refills: 0 | COMMUNITY

## 2018-06-22 RX ORDER — APIXABAN 2.5 MG/1
1 TABLET, FILM COATED ORAL
Qty: 0 | Refills: 0 | COMMUNITY

## 2018-06-22 RX ORDER — LABETALOL HCL 100 MG
50 TABLET ORAL
Qty: 0 | Refills: 0 | COMMUNITY
Start: 2018-06-22

## 2018-06-22 RX ORDER — SODIUM BICARBONATE 1 MEQ/ML
1 SYRINGE (ML) INTRAVENOUS
Qty: 90 | Refills: 0
Start: 2018-06-22 | End: 2018-07-21

## 2018-06-22 RX ADMIN — Medication 50 MILLIGRAM(S): at 11:11

## 2018-06-22 RX ADMIN — Medication 650 MILLIGRAM(S): at 13:37

## 2018-06-22 RX ADMIN — LAMOTRIGINE 100 MILLIGRAM(S): 25 TABLET, ORALLY DISINTEGRATING ORAL at 11:11

## 2018-06-22 RX ADMIN — Medication 50 MILLIGRAM(S): at 05:17

## 2018-06-22 RX ADMIN — Medication 1 MILLIGRAM(S): at 11:11

## 2018-06-22 RX ADMIN — Medication 20 MILLIGRAM(S): at 05:16

## 2018-06-22 RX ADMIN — Medication 650 MILLIGRAM(S): at 05:16

## 2018-06-22 RX ADMIN — Medication 50 MILLIGRAM(S): at 00:24

## 2018-06-22 RX ADMIN — MEMANTINE HYDROCHLORIDE 5 MILLIGRAM(S): 10 TABLET ORAL at 11:11

## 2018-06-22 NOTE — PROGRESS NOTE ADULT - SUBJECTIVE AND OBJECTIVE BOX
Patient is a 85y old  Female who presents with a chief complaint of R. Chest pain, jaw pain, and R. arm paresthesias (08 Jun 2018 10:16)   Patient seen and examined at bedside. No acute complaints.    Overnight Events:    PAST MEDICAL & SURGICAL HISTORY:  Dyslipidemia  Dementia  Thoracic aortic dissection  History of deep vein thrombosis  Hypertension, unspecified type  Status post hip surgery  H/O rotator cuff tear  H/O partial thyroidectomy      MEDICATIONS  (STANDING):  deplin 15 milliGRAM(s) 15 milliGRAM(s) Oral daily  folic acid 1 milliGRAM(s) Oral daily  furosemide    Tablet 20 milliGRAM(s) Oral daily  labetalol 50 milliGRAM(s) Oral every 6 hours  lamoTRIgine 100 milliGRAM(s) Oral daily  memantine 5 milliGRAM(s) Oral daily  memantine 10 milliGRAM(s) Oral at bedtime  sodium bicarbonate 650 milliGRAM(s) Oral three times a day    MEDICATIONS  (PRN):  acetaminophen   Tablet. 650 milliGRAM(s) Oral every 6 hours PRN Mild Pain (1 - 3)      Vital Signs Last 24 Hrs  T(C): 36.6 (22 Jun 2018 05:00), Max: 37.8 (21 Jun 2018 23:56)  T(F): 97.9 (22 Jun 2018 05:00), Max: 100 (21 Jun 2018 23:56)  HR: 72 (22 Jun 2018 11:08) (65 - 82)  BP: 139/77 (22 Jun 2018 11:08) (122/64 - 142/68)  BP(mean): --  RR: 19 (22 Jun 2018 05:00) (18 - 20)  SpO2: 95% (21 Jun 2018 21:00) (95% - 95%)  CAPILLARY BLOOD GLUCOSE        I&O's Summary      Physical Exam:    GENERAL APPEARANCE:  alert and cooperative, and appears to be in no acute distress.  HEENT: Head Normocephalic/Atraumatic  NECK: Neck supple, non-tender without lymphadenopathy, masses or thyromegaly.  CARDIOVASCULAR: RRR, Normal S1 and S2.   PULMONARY: Lungs CTA B/L, no wheezing  GI: Positive bowel sounds. Abdomen soft, nondistended, nontender. No guarding or rebound. No HSM.  MUSCULOSKELETAL: No joint erythema or tenderness.   EXTREMITIES: No edema. Peripheral pulses intact.   NEUROLOGICAL: AAOx3. No focal deficits.

## 2018-06-22 NOTE — PROGRESS NOTE ADULT - PROVIDER SPECIALTY LIST ADULT
CCU
Cardiology
Internal Medicine
Nephrology
[facility]U
Nephrology
Internal Medicine

## 2018-06-22 NOTE — PROGRESS NOTE ADULT - ASSESSMENT
84 y/o F with PMH of aortic aneurysm (diagnosed February 2017), DVT (diagnosed March, 2017) on eliquis, HLD, HTN, and dementia presented for acute onset of right chest pain, jaw pain, and R. arm paresthesias found to have worsening of aortic dissection    #Ascending aortic dissection (type A) -  - Medical management, on Labetalol  - BP is well controlled now  - will hold for SBP < 100    #Hypertension -   - Continue with Labetalol 50 mg Q6    #Gradual decrease in hemoglobin/ Normocytic anemia:  - status post transfusion 1 unit on 6/14, has been stable since  - No evidence of active bleeding    #Leukocytosis/ urine analysis s/o UTI:  - completed Rocephin  - Leukocytosis resolving    #[place] on CKD/ Hyperkalemia:  - Bladder scan done post void showed no residual urine  - Urine lytes likely Prerenal vs cardiorenal  - Anion gap metabolic acidosis: c/w PO bicarb as per Nephro    #Congestive heart failure:  - Despite IV and PO diuresis with Lasix, patient desaturates to:    - Room air pulse ox. at rest: 87%    - Pulse ox at rest on 2L O2 via NC: 92%    - Room air pulse ox while ambulating: pt could not be ambulated    - Pulse ox while ambulating: pt could not be ambulated    - Patient will require home O2 for discharge.  Patient is aware and agreeable to home O2.  Patient is in a chronic stable state of CHF.     #Dementia  - c/w Memantine    #Dyslipidemia  - Dose of Lipitor reduced to 10 mg, home dose.    #Disposition: Home with home care today  #Code status: DNR/ DNI

## 2018-06-22 NOTE — PROGRESS NOTE ADULT - ATTENDING COMMENTS
84 y/o F with PMH of aortic aneurysm (diagnosed February 2017), DVT (diagnosed March, 2017) on eliquis, HLD, HTN, and dementia presented for acute onset of right chest pain, jaw pain, and R. arm paresthesias found to have worsening of aortic dissection    1. Ascending aortic dissection       Stable on Labetalol     BP   well controlled       will hold for SBP < 100      3.  Normocytic anemia        transfusion PRN     No evidence of active bleeding     c/w Iron      4.  Leukocytosis  UTI       completed course of Rocephin    5. [place] on CKD/ Hyperkalemia AGMA      Bladder scan - no residual urine     Urine lytes  Osmo UO     Agree w/ Lasix; Bicarb    6. Congestive heart failure pleural effusion     c/w lasix       hypoxic  on  room air on rest     DC planning  home with Oxygen via NC 3 liters    7.  Dementia     c/w Memantine    8.  Dyslipidemia     c/w Lipitor 80mg    Disposition: DC planning home with home care/ home oxygen once stable   DNR / DNI
84 y/o F with PMH of aortic aneurysm (diagnosed February 2017), DVT (diagnosed March, 2017) on eliquis, HLD, HTN, and dementia presented for acute onset of right chest pain, jaw pain, and R. arm paresthesias found to have worsening of aortic dissection now reaching the ascending aorta therefore type A.      1.  Ascending aortic dissection    type A    CT surgery eval- conservative management 2/2 high risk Sx     A-line placed     On labetalol for a target SBP of 110-130 and HR of around 60 as per Dr. Bradley's recommendations      Check 2d echo, trend cardiac enzymes, will increase lipitor to 80mg qhs.      2. DVT      Hold Eliquis in view of active dissection.     3 HTN      Hold Lisinopril now on labetalol     4.  Dementia      On Namenda.      5. HLD      On lipitor .
84 y/o F with PMH of aortic aneurysm (diagnosed February 2017), DVT (diagnosed March, 2017) on eliquis, HLD, HTN, and dementia presented for acute onset of right chest pain, jaw pain, and R. arm paresthesias found to have worsening of aortic dissection    1. Ascending aortic dissection       Stable on Labetalol     BP   well controlled       will hold for SBP < 100    2.  Normocytic anemia        transfusion PRN     No evidence of active bleeding     c/w Iron      3.  Leukocytosis  UTI       completed course of Rocephin    4. [place] on CKD/ Hyperkalemia AGMA      Bladder scan - no residual urine     Urine lytes  Osmo UO     Agree w/ Lasix; Bicarb    5. Congestive heart failure pleural effusion     c/w lasix       hypoxic  on  room air on rest     DC planning  home with Oxygen via NC 3 liters    6.  Dementia     c/w Memantine    7.  Dyslipidemia     c/w Lipitor 80mg    Disposition: DC planning home with home care/ home oxygen once stable   DNR / DNI.
84 y/o F with PMH of aortic aneurysm (diagnosed February 2017), DVT (diagnosed March, 2017) on eliquis, HLD, HTN, and dementia presented for acute onset of right chest pain, jaw pain, and R. arm paresthesias found to have worsening of aortic dissection    1. Ascending aortic dissection (type A) -     Medical management, on Labetalol     BP is well controlled       will hold for SBP < 100    2.  Hypertension       Continue with Labetalol 50 mg Q6    3. Gradual decrease in hemoglobin/ Normocytic anemia:     Hemoglobin 8.2 today, status post transfusion 1 unit on 6/14     will monitor     No evidence of active bleeding     Iron studies; if guaiac neg the GI eval    4.  Leukocytosis/ urine analysis s/o UTI:  c/w Rocephin    5. [place] on CKD/ Hyperkalemia:    Bladder scan done post voidal showed no residual urine     Urine lytes likely Prerenal vs cardiorenal    6. Congestive heart failure pleural effusion    lasix in creased    still is hypoxic to 88% at room air on rest. Tested in chronic stable state.     DC planning  home with Oxygen via NC 3 liters    7.  Dementia     c/w Memantine    8.  Dyslipidemia     c/w Lipitor 80mg    Disposition: ADC planning home with home care/ home oxygen once stable  # Code status: Family decided to make her DNR / DNI after discussing with Dr. Sykes.
84 y/o F with PMH of aortic aneurysm (diagnosed February 2017), DVT (diagnosed March, 2017) on eliquis, HLD, HTN, and dementia presented for acute onset of right chest pain, jaw pain, and R. arm paresthesias found to have worsening of aortic dissection    1. Ascending aortic dissection (type A) -     Medical management, on Labetalol     BP is well controlled       will hold for SBP < 100    2.  Hypertension       hold  Labetalol 50 mg Q6    3. Gradual decrease in hemoglobin/ Normocytic anemia:     Hemoglobin 8.2 today, status post transfusion 1 unit on 6/14     will monitor     No evidence of active bleeding     Iron studies; if guaiac neg the GI eval    4.  Leukocytosis/ urine analysis s/o UTI:  c/w Rocephin    5. [place] on CKD/ Hyperkalemia AGMA    Bladder scan - no residual urine     Urine lytes  Osmo UO     Agree w/ Lasix; BIcarb    6. Congestive heart failure pleural effusion     c/w lasix      still is hypoxic to 88% at room air on rest. Tested in chronic stable state.     DC planning  home with Oxygen via NC 3 liters    7.  Dementia     c/w Memantine    8.  Dyslipidemia     c/w Lipitor 80mg    Disposition: ADC planning home with home care/ home oxygen once stable   DNR / DNI
84 y/o F with PMH of aortic aneurysm (diagnosed February 2017), DVT (diagnosed March, 2017) on eliquis, HLD, HTN, and dementia presented for acute onset of right chest pain, jaw pain, and R. arm paresthesias found to have worsening of aortic dissection now reaching the ascending aorta therefore type A.      1.  Ascending aortic dissection    type A    CT surgery eval- conservative management     A-line placed     On labetalol for a target SBP of 110-130 and HR of around 60 as per Dr. Bradley's recommendations      Check 2d echo, trend cardiac enzymes, will increase lipitor to 80mg qhs.      2. DVT      Hold Eliquis in view of active dissection.     3 HTN      Hold Lisinopril now on labetalol     4.  Dementia      On Namenda.      5. HLD      On lipitor .
84 y/o F with PMH of aortic aneurysm (diagnosed February 2017), DVT (diagnosed March, 2017) on eliquis, HLD, HTN, and dementia presented for acute onset of right chest pain, jaw pain, and R. arm paresthesias found to have worsening of aortic dissection now reaching the ascending aorta therefore type A.      1.  Ascending aortic dissection    type A    CT surgery eval- conservative management 2/2 high risk Sx     On labetalol PO 50 q8 per Dr. Bradley's recommendations       Downgrade to floor      2. DVT      Hold Eliquis in view of active dissection.     3 HTN      Hold Lisinopril now on labetalol     4.  Dementia      On Namenda.      5. HLD      On lipitor
86 y/o F with PMH of aortic aneurysm (diagnosed February 2017), DVT (diagnosed March, 2017) on eliquis, HLD, HTN, and dementia presented for acute onset of right chest pain, jaw pain, and R. arm paresthesias found to have worsening of aortic dissection now reaching the ascending aorta therefore type A.      1.  Ascending aortic dissection           CT surgery eval- conservative management 2/2 high risk Sx        Family would like a second opinion once pt is discharged        Labetalol   change dot PO 2/2 episode of hypotension reso lved w/ IVF blus          2. DVT      Hold Eliquis in view of active dissection.     3 HTN      Hold Lisinopril now on labetalol     4.  Dementia      On Namenda.      5. HLD       On lipitor
86 y/o F with PMH of aortic aneurysm (diagnosed February 2017), DVT (diagnosed March, 2017) on eliquis, HLD, HTN, and dementia presented for acute onset of right chest pain, jaw pain, and R. arm paresthesias found to have worsening of aortic dissection now reaching the ascending aorta therefore type A.      1.  Ascending aortic dissection        CT surgery eval- conservative management 2/2 high risk Sx     On labetalol PO 50 q8 per Cardio recommendations       continue CCU monitoring?      2. DVT      Hold Eliquis in view of active dissection.     3 HTN      Hold Lisinopril now on labetalol     4.  Dementia      On Namenda.      5. HLD      On lipitor.

## 2018-06-22 NOTE — PROGRESS NOTE ADULT - SUBJECTIVE AND OBJECTIVE BOX
ZULEMAJAMES SCAR  85y  Female    Patient is a 85y old  Female who presents with a chief complaint of R. Chest pain, jaw pain, and R. arm paresthesias (08 Jun 2018 10:16)      INTERVAL HPI/OVERNIGHT EVENTS: None    T(C): 37.8 (06-21-18 @ 23:56), Max: 37.8 (06-21-18 @ 23:56)  HR: 75 (06-21-18 @ 23:56) (65 - 82)  BP: 142/68 (06-21-18 @ 23:56) (122/64 - 144/67)  RR: 19 (06-21-18 @ 23:56) (18 - 20)  SpO2: 95% (06-21-18 @ 21:00) (95% - 95%)  Wt(kg): --Vital Signs Last 24 Hrs  T(C): 37.8 (21 Jun 2018 23:56), Max: 37.8 (21 Jun 2018 23:56)  T(F): 100 (21 Jun 2018 23:56), Max: 100 (21 Jun 2018 23:56)  HR: 75 (21 Jun 2018 23:56) (65 - 82)  BP: 142/68 (21 Jun 2018 23:56) (122/64 - 144/67)  BP(mean): --  RR: 19 (21 Jun 2018 23:56) (18 - 20)  SpO2: 95% (21 Jun 2018 21:00) (95% - 95%)    PHYSICAL EXAM:  GENERAL: NAD, well-groomed, well-developed  HEAD:  Atraumatic, Normocephalic  NECK: Supple, No JVD, Normal thyroid  NERVOUS SYSTEM:  Alert & Oriented X3, Good concentration; Motor Strength 5/5 B/L upper and lower extremities; DTRs 2+ intact and symmetric  CHEST/LUNG: Clear to percussion bilaterally; No rales, rhonchi, wheezing, or rubs  HEART: Regular rate and rhythm; No murmurs, rubs, or gallops  ABDOMEN: Soft, Nontender, Nondistended; Bowel sounds present  EXTREMITIES:  2+ Peripheral Pulses, No clubbing, cyanosis, or edema    Consultant(s) Notes Reviewed:  [x ] YES  [ ] NO    Discussed with Consultants/Other Providers/Residents [ x] YES     LABS                   CAPILLARY BLOOD GLUCOSE            RADIOLOGY & ADDITIONAL TESTS:    Imaging Personally Reviewed:  [x ] YES  [ ] NO    MEDICATIONS  (STANDING):  deplin 15 milliGRAM(s) 15 milliGRAM(s) Oral daily  folic acid 1 milliGRAM(s) Oral daily  furosemide    Tablet 20 milliGRAM(s) Oral daily  labetalol 50 milliGRAM(s) Oral every 6 hours  lamoTRIgine 100 milliGRAM(s) Oral daily  memantine 5 milliGRAM(s) Oral daily  memantine 10 milliGRAM(s) Oral at bedtime  sodium bicarbonate 650 milliGRAM(s) Oral three times a day    MEDICATIONS  (PRN):  acetaminophen   Tablet. 650 milliGRAM(s) Oral every 6 hours PRN Mild Pain (1 - 3)      HEALTH ISSUES - PROBLEM Dx:  Dyslipidemia: Dyslipidemia  Dementia: Dementia  Hypertension, unspecified type: Hypertension, unspecified type  History of deep vein thrombosis: History of deep vein thrombosis  Ascending aortic dissection: Ascending aortic dissection

## 2018-07-02 ENCOUNTER — OUTPATIENT (OUTPATIENT)
Dept: OUTPATIENT SERVICES | Facility: HOSPITAL | Age: 83
LOS: 1 days | Discharge: HOME | End: 2018-07-02

## 2018-07-02 DIAGNOSIS — R78.89 FINDING OF OTHER SPECIFIED SUBSTANCES, NOT NORMALLY FOUND IN BLOOD: ICD-10-CM

## 2018-07-02 DIAGNOSIS — Z98.890 OTHER SPECIFIED POSTPROCEDURAL STATES: Chronic | ICD-10-CM

## 2018-07-02 DIAGNOSIS — E89.0 POSTPROCEDURAL HYPOTHYROIDISM: Chronic | ICD-10-CM

## 2018-07-02 DIAGNOSIS — I25.10 ATHEROSCLEROTIC HEART DISEASE OF NATIVE CORONARY ARTERY WITHOUT ANGINA PECTORIS: ICD-10-CM

## 2018-07-02 DIAGNOSIS — B58.81 TOXOPLASMA MYOCARDITIS: ICD-10-CM

## 2018-07-02 DIAGNOSIS — E11.9 TYPE 2 DIABETES MELLITUS WITHOUT COMPLICATIONS: ICD-10-CM

## 2018-07-02 DIAGNOSIS — Z87.39 PERSONAL HISTORY OF OTHER DISEASES OF THE MUSCULOSKELETAL SYSTEM AND CONNECTIVE TISSUE: Chronic | ICD-10-CM

## 2018-07-05 DIAGNOSIS — N18.3 CHRONIC KIDNEY DISEASE, STAGE 3 (MODERATE): ICD-10-CM

## 2018-07-05 DIAGNOSIS — I71.01 DISSECTION OF THORACIC AORTA: ICD-10-CM

## 2018-07-05 DIAGNOSIS — Z79.01 LONG TERM (CURRENT) USE OF ANTICOAGULANTS: ICD-10-CM

## 2018-07-05 DIAGNOSIS — R00.1 BRADYCARDIA, UNSPECIFIED: ICD-10-CM

## 2018-07-05 DIAGNOSIS — Z66 DO NOT RESUSCITATE: ICD-10-CM

## 2018-07-05 DIAGNOSIS — E87.5 HYPERKALEMIA: ICD-10-CM

## 2018-07-05 DIAGNOSIS — R20.2 PARESTHESIA OF SKIN: ICD-10-CM

## 2018-07-05 DIAGNOSIS — E87.1 HYPO-OSMOLALITY AND HYPONATREMIA: ICD-10-CM

## 2018-07-05 DIAGNOSIS — R09.02 HYPOXEMIA: ICD-10-CM

## 2018-07-05 DIAGNOSIS — R26.0 ATAXIC GAIT: ICD-10-CM

## 2018-07-05 DIAGNOSIS — Z86.718 PERSONAL HISTORY OF OTHER VENOUS THROMBOSIS AND EMBOLISM: ICD-10-CM

## 2018-07-05 DIAGNOSIS — I50.30 UNSPECIFIED DIASTOLIC (CONGESTIVE) HEART FAILURE: ICD-10-CM

## 2018-07-05 DIAGNOSIS — I11.0 HYPERTENSIVE HEART DISEASE WITH HEART FAILURE: ICD-10-CM

## 2018-07-05 DIAGNOSIS — I95.9 HYPOTENSION, UNSPECIFIED: ICD-10-CM

## 2018-07-05 DIAGNOSIS — F03.90 UNSPECIFIED DEMENTIA WITHOUT BEHAVIORAL DISTURBANCE: ICD-10-CM

## 2018-07-05 DIAGNOSIS — Z90.710 ACQUIRED ABSENCE OF BOTH CERVIX AND UTERUS: ICD-10-CM

## 2018-07-05 DIAGNOSIS — I48.0 PAROXYSMAL ATRIAL FIBRILLATION: ICD-10-CM

## 2018-07-05 DIAGNOSIS — E78.5 HYPERLIPIDEMIA, UNSPECIFIED: ICD-10-CM

## 2018-07-05 DIAGNOSIS — I51.7 CARDIOMEGALY: ICD-10-CM

## 2018-07-05 DIAGNOSIS — I13.0 HYPERTENSIVE HEART AND CHRONIC KIDNEY DISEASE WITH HEART FAILURE AND STAGE 1 THROUGH STAGE 4 CHRONIC KIDNEY DISEASE, OR UNSPECIFIED CHRONIC KIDNEY DISEASE: ICD-10-CM

## 2018-07-05 DIAGNOSIS — I35.1 NONRHEUMATIC AORTIC (VALVE) INSUFFICIENCY: ICD-10-CM

## 2018-07-05 DIAGNOSIS — N17.9 ACUTE KIDNEY FAILURE, UNSPECIFIED: ICD-10-CM

## 2018-07-05 DIAGNOSIS — D64.9 ANEMIA, UNSPECIFIED: ICD-10-CM

## 2018-07-05 DIAGNOSIS — N39.0 URINARY TRACT INFECTION, SITE NOT SPECIFIED: ICD-10-CM

## 2018-07-10 ENCOUNTER — APPOINTMENT (OUTPATIENT)
Dept: HEMATOLOGY ONCOLOGY | Facility: CLINIC | Age: 83
End: 2018-07-10

## 2018-08-15 ENCOUNTER — OUTPATIENT (OUTPATIENT)
Dept: OUTPATIENT SERVICES | Facility: HOSPITAL | Age: 83
LOS: 1 days | Discharge: HOME | End: 2018-08-15

## 2018-08-15 DIAGNOSIS — I10 ESSENTIAL (PRIMARY) HYPERTENSION: ICD-10-CM

## 2018-08-15 DIAGNOSIS — Z87.39 PERSONAL HISTORY OF OTHER DISEASES OF THE MUSCULOSKELETAL SYSTEM AND CONNECTIVE TISSUE: Chronic | ICD-10-CM

## 2018-08-15 DIAGNOSIS — N18.9 CHRONIC KIDNEY DISEASE, UNSPECIFIED: ICD-10-CM

## 2018-08-15 DIAGNOSIS — I50.9 HEART FAILURE, UNSPECIFIED: ICD-10-CM

## 2018-08-15 DIAGNOSIS — I71.01 DISSECTION OF THORACIC AORTA: ICD-10-CM

## 2018-08-15 DIAGNOSIS — E89.0 POSTPROCEDURAL HYPOTHYROIDISM: Chronic | ICD-10-CM

## 2018-08-15 DIAGNOSIS — Z98.890 OTHER SPECIFIED POSTPROCEDURAL STATES: Chronic | ICD-10-CM

## 2018-08-15 DIAGNOSIS — I25.10 ATHEROSCLEROTIC HEART DISEASE OF NATIVE CORONARY ARTERY WITHOUT ANGINA PECTORIS: ICD-10-CM

## 2018-08-15 PROBLEM — E78.5 HYPERLIPIDEMIA, UNSPECIFIED: Chronic | Status: ACTIVE | Noted: 2018-06-01

## 2018-08-15 PROBLEM — F03.90 UNSPECIFIED DEMENTIA WITHOUT BEHAVIORAL DISTURBANCE: Chronic | Status: ACTIVE | Noted: 2018-06-01

## 2018-08-15 PROBLEM — Z86.718 PERSONAL HISTORY OF OTHER VENOUS THROMBOSIS AND EMBOLISM: Chronic | Status: ACTIVE | Noted: 2018-06-01

## 2018-10-29 ENCOUNTER — OUTPATIENT (OUTPATIENT)
Dept: OUTPATIENT SERVICES | Facility: HOSPITAL | Age: 83
LOS: 1 days | Discharge: HOME | End: 2018-10-29

## 2018-10-29 DIAGNOSIS — N18.4 CHRONIC KIDNEY DISEASE, STAGE 4 (SEVERE): ICD-10-CM

## 2018-10-29 DIAGNOSIS — E78.00 PURE HYPERCHOLESTEROLEMIA, UNSPECIFIED: ICD-10-CM

## 2018-10-29 DIAGNOSIS — Z98.890 OTHER SPECIFIED POSTPROCEDURAL STATES: Chronic | ICD-10-CM

## 2018-10-29 DIAGNOSIS — E03.9 HYPOTHYROIDISM, UNSPECIFIED: ICD-10-CM

## 2018-10-29 DIAGNOSIS — D64.9 ANEMIA, UNSPECIFIED: ICD-10-CM

## 2018-10-29 DIAGNOSIS — E89.0 POSTPROCEDURAL HYPOTHYROIDISM: Chronic | ICD-10-CM

## 2018-10-29 DIAGNOSIS — Z87.39 PERSONAL HISTORY OF OTHER DISEASES OF THE MUSCULOSKELETAL SYSTEM AND CONNECTIVE TISSUE: Chronic | ICD-10-CM

## 2018-11-21 NOTE — ED ADULT NURSE NOTE - NS ED PATIENT SAFETY CONCERN
After Coronary Artery Bypass Surgery  Your healthcare provider performed coronary artery bypass graft surgery (also called CABG, pronounced cabbage). This surgery created new pathways around blocked parts of your hearts blood vessels, allowing blood to reach your heart muscle. Your healthcare provider used a healthy blood vessel from another part of your body (a graft) to restore blood flow.  Activity  · Discuss with your healthcare doctor what you can and cant do as you recover. You will have good and bad days. This is normal. But tell your healthcare provider if you feel depressed, have trouble sleeping, or have a persistent decrease in appetite. Although these problems are common after surgery, they can slow your recovery. Its important to seek help.  · Let others drive you wherever you need to go for the first 3 to 6 weeks after your surgery.  · Ask someone to stand nearby while you shower or do other activities, just in case you need help.  · Avoid using very hot water while showering. It can affect your circulation and make you dizzy.  · Weigh yourself every day, at the same time of day, and in the same kind of clothes. Quick weight gain can be a sign of a problem that needs your healthcare providers attention.  · You may start doing light work around the house and yard after 2 to 3 weeks at home. Dont lift anything heavier than 5 pounds. Your healthcare provider may give you a more specific weight restriction. Until approved by your healthcare provider, avoid mowing the lawn, vacuuming, driving, and doing other activities that could strain your breastbone.  · Ask your healthcare provider when you can expect to return to work.  Pain relief  You will recover faster after surgery if your pain is kept under control:  · Dont be surprised if you feel sharp pains as your breastbone heals or if you have soreness in your incision during changes in weather.  · Tell your healthcare provider if you have  questions about what youre feeling, if your medicines dont reduce your pain, or if you suddenly feel worse.  Incision care  Healing takes several weeks. The bandage or dressing on your chest will likely be removed before you go home. If it is still in place, ask your healthcare provider how you should care for it after you return home. Do the following to care for your incision:  · If there are any steri-strips still on your incision, you can remove them after a week if they haven't already fallen off.  · Clean your incision every day with soap and water.  · Gently pat the area of the incision to dry it.  · Dont use any powders, lotions, or oils on your incision until it is well healed.  Lifestyle changes  · Ask your healthcare provider when you can start a walking program:  ¨ If you havent already started a walking program in the hospital, begin with short walks (about 5 minutes) at home. Go a little longer each day.  ¨ Choose a safe place with a level surface, such as a local park or mall.  ¨ Wear supportive shoes to prevent injury to your knees and ankles.  ¨ Walk with someone. Its more fun and helps you stay with it.  · Take your medicines exactly as directed. Dont skip doses.  · Maintain a healthy weight. Get help to lose any extra pounds.  · Avoid fatty and fried foods. Stick to lean meats, such as chicken or fish.   · Cut back on salt:  ¨ Limit canned, dried, packaged, and fast foods.  ¨ Dont add salt to your food at the table.  ¨ Season foods with herbs instead of salt when you cook.  · Break the smoking habit. Enroll in a stop-smoking program to improve your chances of success.  When to call your healthcare provider  Call your healthcare provider immediately if you have any of the following:  · Chest pain or a return of the heart symptoms you had before your surgery  · Fever of 100.4°F (38°C) or higher, or as directed by your healthcare provider  · Signs of infection (redness, swelling, drainage, or  warmth) at the incision site  · Shortness of breath  · Fainting  · Weight gain of more than 3 pounds in 1 day, more than 5 pounds in 1 week, or whatever weight gain you were told to report by your healthcare provider  · New or increased swelling in your hands, feet, or ankles  · Unrelieved pain at the incision site(s)  · Changes in the location, type, or severity of pain  · Fast or irregular pulse  · Persistent abdominal pain  · Nausea  · Trouble urinating  · Any unusual bleeding   Date Last Reviewed: 10/1/2016  © 4485-9971 [employer]. [address] [place] [address]. All rights reserved. This information is not intended as a substitute for professional medical care. Always follow your healthcare professional's instructions.         No

## 2019-02-12 NOTE — ED PROVIDER NOTE - PROGRESS NOTE DETAILS
Pressure applied with tongue blades, patient states she does not feel a post nasal drip, will re assess after having pressure applied for 20 minutes After tongue blade pressure was removed, patient continue to bleed, we placed a rhinorocket dipped in TXA, will re assess in 2 hours. I will SWITCH the dose or number of times a day I take the medications listed below when I get home from the hospital:  None Rhinorocket with TXA removed at the 2 hour veronica, no active bleeding, no post nasal drip Patient's bleeding controlled, will discharge with follow up with ENT. Patient and family understand to see PMD and ENT as soon as possible. Patient and family understand discharge plan and agree. I will SWITCH the dose or number of times a day I take the medications listed below when I get home from the hospital:    ferrous sulfate  -- 1 tab(s) by mouth once a day

## 2019-04-12 ENCOUNTER — OUTPATIENT (OUTPATIENT)
Dept: OUTPATIENT SERVICES | Facility: HOSPITAL | Age: 84
LOS: 1 days | Discharge: HOME | End: 2019-04-12

## 2019-04-12 DIAGNOSIS — Z87.39 PERSONAL HISTORY OF OTHER DISEASES OF THE MUSCULOSKELETAL SYSTEM AND CONNECTIVE TISSUE: Chronic | ICD-10-CM

## 2019-04-12 DIAGNOSIS — J44.9 CHRONIC OBSTRUCTIVE PULMONARY DISEASE, UNSPECIFIED: ICD-10-CM

## 2019-04-12 DIAGNOSIS — E89.0 POSTPROCEDURAL HYPOTHYROIDISM: Chronic | ICD-10-CM

## 2019-04-12 DIAGNOSIS — Z98.890 OTHER SPECIFIED POSTPROCEDURAL STATES: Chronic | ICD-10-CM

## 2019-07-19 ENCOUNTER — OUTPATIENT (OUTPATIENT)
Dept: OUTPATIENT SERVICES | Facility: HOSPITAL | Age: 84
LOS: 1 days | Discharge: HOME | End: 2019-07-19

## 2019-07-19 DIAGNOSIS — E89.0 POSTPROCEDURAL HYPOTHYROIDISM: Chronic | ICD-10-CM

## 2019-07-19 DIAGNOSIS — Z98.890 OTHER SPECIFIED POSTPROCEDURAL STATES: Chronic | ICD-10-CM

## 2019-07-19 DIAGNOSIS — Z87.39 PERSONAL HISTORY OF OTHER DISEASES OF THE MUSCULOSKELETAL SYSTEM AND CONNECTIVE TISSUE: Chronic | ICD-10-CM

## 2019-07-19 DIAGNOSIS — I25.10 ATHEROSCLEROTIC HEART DISEASE OF NATIVE CORONARY ARTERY WITHOUT ANGINA PECTORIS: ICD-10-CM

## 2019-07-19 DIAGNOSIS — R78.89 FINDING OF OTHER SPECIFIED SUBSTANCES, NOT NORMALLY FOUND IN BLOOD: ICD-10-CM

## 2019-07-19 DIAGNOSIS — B58.81 TOXOPLASMA MYOCARDITIS: ICD-10-CM

## 2019-07-23 ENCOUNTER — RX RENEWAL (OUTPATIENT)
Age: 84
End: 2019-07-23

## 2019-08-05 ENCOUNTER — RX RENEWAL (OUTPATIENT)
Age: 84
End: 2019-08-05

## 2019-08-16 ENCOUNTER — RX RENEWAL (OUTPATIENT)
Age: 84
End: 2019-08-16

## 2019-09-04 ENCOUNTER — APPOINTMENT (OUTPATIENT)
Dept: NEUROLOGY | Facility: CLINIC | Age: 84
End: 2019-09-04

## 2019-09-05 ENCOUNTER — RX RENEWAL (OUTPATIENT)
Age: 84
End: 2019-09-05

## 2019-09-27 ENCOUNTER — RX RENEWAL (OUTPATIENT)
Age: 84
End: 2019-09-27

## 2019-10-25 ENCOUNTER — RX RENEWAL (OUTPATIENT)
Age: 84
End: 2019-10-25

## 2019-10-28 ENCOUNTER — RX RENEWAL (OUTPATIENT)
Age: 84
End: 2019-10-28

## 2019-10-28 RX ORDER — LAMOTRIGINE 25 MG/1
25 TABLET ORAL
Qty: 42 | Refills: 0 | Status: DISCONTINUED | COMMUNITY
Start: 2019-08-05 | End: 2019-10-28

## 2019-10-28 RX ORDER — LAMOTRIGINE 150 MG/1
150 TABLET ORAL
Qty: 7 | Refills: 0 | Status: DISCONTINUED | COMMUNITY
Start: 2019-08-05 | End: 2019-10-28

## 2019-10-31 ENCOUNTER — RX RENEWAL (OUTPATIENT)
Age: 84
End: 2019-10-31

## 2019-11-20 ENCOUNTER — APPOINTMENT (OUTPATIENT)
Dept: NEUROLOGY | Facility: CLINIC | Age: 84
End: 2019-11-20
Payer: MEDICARE

## 2019-11-20 PROCEDURE — 99214 OFFICE O/P EST MOD 30 MIN: CPT

## 2019-11-26 NOTE — HISTORY OF PRESENT ILLNESS
[FreeTextEntry1] : Ms. DING presents today for a follow up visit of  Dementia and depression with gait imbalance. Last MRI brain 2015 showing advanced microvascular ischemic disease and mild cerebellar atrophy. \par \par Since last seen, patient and family report progression in cognitive impairment, particularly in STM. Patient also continues to have symptoms of depression. Reports feeling down, not looking forward to the next day and no desire to engage in activities she previously liked to do. Her gait continues to be very unsteady. \par \par Lamictal dose has been titrated to current dose of 100mg a day. Family felt patient had worsening in fatigue with higher doses. On current dose, they do feel fatigue is better but still persistent. \par \par Perviously participated in PT but completed course  about 6-8 weeks ago. In previous visits, psychiatry consultation was recommended but due to travel, patient has not been seen. \par \par Continues to be on Deplin 15mg and namenda 5-10mg wihtou side effects.

## 2019-11-26 NOTE — PHYSICAL EXAM
[General Appearance - Alert] : alert [General Appearance - In No Acute Distress] : in no acute distress [Oriented To Time, Place, And Person] : oriented to person, place, and time [Over the Past 2 Weeks, Have You Felt Down, Depressed, or Hopeless?] : 1.) Over the past 2 weeks, have you felt down, depressed, or hopeless? Yes [Over the Past 2 Weeks, Have You Felt Little Interest or Pleasure Doing Things?] : 2.) Over the past 2 weeks, have you felt little interest or pleasure doing things? Yes [Person] : oriented to person [Extraocular Movements] : extraocular movements were intact [Cranial Nerves Optic (II)] : visual acuity intact bilaterally,  visual fields full to confrontation, pupils equal round and reactive to light [Cranial Nerves Oculomotor (III)] : extraocular motion intact [Cranial Nerves Trigeminal (V)] : facial sensation intact symmetrically [Cranial Nerves Hypoglossal (XII)] : there was no tongue deviation with protrusion [Cranial Nerves Facial (VII)] : face symmetrical [FreeTextEntry4] : PATIENT UNABLE TO COMPLETE MOCA TESTING. AS GETTING FRUSTRATED AS TESTING PERSISTED. DID HAVE 2/5 RECALL  [FreeTextEntry6] : equal strength throughout.  [FreeTextEntry8] : UNSTEADY BALANCE. NEEDS 1-2 PERSON ASSIST TO STAND AND WALK. PRESENT IN A WHEELCHAIR

## 2019-11-26 NOTE — DISCUSSION/SUMMARY
[FreeTextEntry1] : Patient with Dementia and depression with cognitive decline and gait impairment. Last MRI brain 2015 showing advanced microvascular ischemic disease and mild cerebellar atrophy. \par \par Since last seen patient and family deny any significant changes. Continues to be inactive leading to poor balance and deconditioning. Depression continues to be a factor with very little engagement. Short term memory continues to decline. \par \par Diagnoses was discussed at length with family. Treatment at this time is more focused on increasing engagement, having a schedule and being more active socially. At this time we recommend the following: \par \par 1. Consider repeating MRI brain W/O in the future\par 2. Continue Lamictal 100mg daily\par 3. Continue Deplin 15mg\par 4. Continue Namenda 5-10mg \par 5. Recommend psychiatry evaluation to mange Depression. Dr. Alpers contact information given. \par 6. RTC in 8 weeks

## 2019-12-10 ENCOUNTER — RX RENEWAL (OUTPATIENT)
Age: 84
End: 2019-12-10

## 2020-01-01 NOTE — ED ADULT NURSE NOTE - CHPI ED SYMPTOMS POS
Pt's mother reports pt was seen in clinic today and his bili was 20 or 21, reports she was told to come in for photo therapy.  Mother reports pt had elevated bili a few days after birth requiring bili lights, and then again on Thursday-Friday.  Reports pt was discharged from NICU on Saturday.  Reports pt is feeding every 3-3.5 hours about 35-40 mls per feeding of breast milk.  Reports good UO and BM's.    
SHORTNESS OF BREATH/CHEST DISCOMFORT

## 2020-06-01 ENCOUNTER — RX RENEWAL (OUTPATIENT)
Age: 85
End: 2020-06-01

## 2020-10-29 NOTE — PHYSICAL THERAPY INITIAL EVALUATION ADULT - STANDING BALANCE: DYNAMIC, REHAB EVAL
Detail Level: Detailed Other (Free Text): Discussed cosmetic removal with electrodesiccation. Patient to consider Note Text (......Xxx Chief Complaint.): This diagnosis correlates Other (Free Text): The patient has been experiencing itching on her palms, her mid posterior to scalp and her ears. She said that she previously had seasonal allergies in the past and was taking antihistamine medicines however it got better so she stopped taking the medications. We will try an antihistamine regimen to see if that takes care of the underlying issue and hopefully calms down the itch. Patient has been frequently manipulating these areas and that has led to lichenification. Other (Free Text): Patient stated she has been unable to wear her hearing aids. When wearing her hearing aids it leads to itching and then she has to use clobetasol in the areas in order to calm down the itching. SHe said she's had the hearing aids for about a year however she avoids wearing them and has not been wearing them for the last six months due to the itching. Discussed with her that we will try the xyzal initially however if there's no improvement then we will refer her over to an allergist so they can do patch testing to make sure that she does not have an allergy to rubbers. poor plus

## 2020-11-23 ENCOUNTER — NON-APPOINTMENT (OUTPATIENT)
Age: 85
End: 2020-11-23

## 2020-11-23 RX ORDER — MEMANTINE HYDROCHLORIDE 10 MG/1
10 TABLET, FILM COATED ORAL
Qty: 30 | Refills: 5 | Status: DISCONTINUED | COMMUNITY
End: 2020-11-23

## 2021-05-17 ENCOUNTER — RX RENEWAL (OUTPATIENT)
Age: 86
End: 2021-05-17

## 2021-05-25 ENCOUNTER — RX RENEWAL (OUTPATIENT)
Age: 86
End: 2021-05-25

## 2021-06-07 ENCOUNTER — APPOINTMENT (OUTPATIENT)
Dept: NEUROLOGY | Facility: CLINIC | Age: 86
End: 2021-06-07
Payer: MEDICARE

## 2021-06-07 PROCEDURE — 99212 OFFICE O/P EST SF 10 MIN: CPT | Mod: 95

## 2021-06-07 NOTE — HISTORY OF PRESENT ILLNESS
[FreeTextEntry1] : Ms. DING presents today for a follow up visit of  Dementia and depression with gait imbalance. Last MRI brain 2015 showing advanced microvascular ischemic disease and mild cerebellar atrophy. \par \par Since last seen patient and family deny any progression of symptoms. Short term memory and mood are stable. \par Continues to be on Deplin 15mg, Namenda 5-10mg and LTG 100mg daily without side effects.\par \par Continue to have periods of depression and decreased motivation. Family is very supportive in encouraging patient to be involved in family activities. \par \par Reports sleeping well. She denies any new neurological complaints at this time.\par \par Current meds: \par Labetalol 50mg BID\par Lisinopril 5-10mg\par Namenda 5-10mg\par Deplin 15mg\par LTG 100mg\par Lasix 20mg 2x week

## 2021-06-07 NOTE — REASON FOR VISIT
[Home] : at home, [unfilled] , at the time of the visit. [Medical Office: ([facility])___] : at the medical office located in  [Other:____] : [unfilled] [Verbal consent obtained from patient] : the patient, [unfilled]

## 2021-06-07 NOTE — DISCUSSION/SUMMARY
[FreeTextEntry1] : Patient with  Dementia and depression with gait imbalance. Last MRI brain 2015 showing advanced microvascular ischemic disease and mild cerebellar atrophy. \par \par Continues to do very well. No progression. Tolerating current medication regimen. No changes a this time.  RTC in 6 months if stable. \par

## 2021-12-20 ENCOUNTER — RX RENEWAL (OUTPATIENT)
Age: 86
End: 2021-12-20

## 2021-12-21 DIAGNOSIS — Z11.52 ENCOUNTER FOR SCREENING FOR COVID-19: ICD-10-CM

## 2021-12-24 LAB — SARS-COV-2 N GENE NPH QL NAA+PROBE: NOT DETECTED

## 2022-04-18 ENCOUNTER — RX RENEWAL (OUTPATIENT)
Age: 87
End: 2022-04-18

## 2022-06-13 ENCOUNTER — RX RENEWAL (OUTPATIENT)
Age: 87
End: 2022-06-13

## 2022-07-18 ENCOUNTER — INPATIENT (INPATIENT)
Facility: HOSPITAL | Age: 87
LOS: 3 days | Discharge: HOME HEALTH | End: 2022-07-22
Attending: HOSPITALIST | Admitting: HOSPITALIST

## 2022-07-18 VITALS
DIASTOLIC BLOOD PRESSURE: 62 MMHG | SYSTOLIC BLOOD PRESSURE: 142 MMHG | HEIGHT: 66 IN | RESPIRATION RATE: 18 BRPM | TEMPERATURE: 98 F | HEART RATE: 59 BPM | OXYGEN SATURATION: 92 %

## 2022-07-18 DIAGNOSIS — Z98.890 OTHER SPECIFIED POSTPROCEDURAL STATES: Chronic | ICD-10-CM

## 2022-07-18 DIAGNOSIS — R09.89 OTHER SPECIFIED SYMPTOMS AND SIGNS INVOLVING THE CIRCULATORY AND RESPIRATORY SYSTEMS: ICD-10-CM

## 2022-07-18 DIAGNOSIS — E89.0 POSTPROCEDURAL HYPOTHYROIDISM: Chronic | ICD-10-CM

## 2022-07-18 DIAGNOSIS — Z87.39 PERSONAL HISTORY OF OTHER DISEASES OF THE MUSCULOSKELETAL SYSTEM AND CONNECTIVE TISSUE: Chronic | ICD-10-CM

## 2022-07-18 LAB
ALBUMIN SERPL ELPH-MCNC: 4.1 G/DL — SIGNIFICANT CHANGE UP (ref 3.5–5.2)
ALP SERPL-CCNC: 59 U/L — SIGNIFICANT CHANGE UP (ref 30–115)
ALT FLD-CCNC: 12 U/L — SIGNIFICANT CHANGE UP (ref 0–41)
ANION GAP SERPL CALC-SCNC: 16 MMOL/L — HIGH (ref 7–14)
APPEARANCE UR: CLEAR — SIGNIFICANT CHANGE UP
APTT BLD: 29.7 SEC — SIGNIFICANT CHANGE UP (ref 27–39.2)
AST SERPL-CCNC: 20 U/L — SIGNIFICANT CHANGE UP (ref 0–41)
BASE EXCESS BLDV CALC-SCNC: -3.4 MMOL/L — LOW (ref -2–3)
BASOPHILS # BLD AUTO: 0.08 K/UL — SIGNIFICANT CHANGE UP (ref 0–0.2)
BASOPHILS NFR BLD AUTO: 0.9 % — SIGNIFICANT CHANGE UP (ref 0–1)
BILIRUB SERPL-MCNC: 0.6 MG/DL — SIGNIFICANT CHANGE UP (ref 0.2–1.2)
BILIRUB UR-MCNC: NEGATIVE — SIGNIFICANT CHANGE UP
BUN SERPL-MCNC: 32 MG/DL — HIGH (ref 10–20)
CA-I SERPL-SCNC: 1.23 MMOL/L — SIGNIFICANT CHANGE UP (ref 1.15–1.33)
CALCIUM SERPL-MCNC: 9.6 MG/DL — SIGNIFICANT CHANGE UP (ref 8.5–10.1)
CHLORIDE SERPL-SCNC: 99 MMOL/L — SIGNIFICANT CHANGE UP (ref 98–110)
CO2 SERPL-SCNC: 19 MMOL/L — SIGNIFICANT CHANGE UP (ref 17–32)
COLOR SPEC: SIGNIFICANT CHANGE UP
CREAT SERPL-MCNC: 1.6 MG/DL — HIGH (ref 0.7–1.5)
CRP SERPL-MCNC: 71.6 MG/L — HIGH
D DIMER BLD IA.RAPID-MCNC: 1025 NG/ML DDU — HIGH (ref 0–230)
DIFF PNL FLD: NEGATIVE — SIGNIFICANT CHANGE UP
EGFR: 31 ML/MIN/1.73M2 — LOW
EOSINOPHIL # BLD AUTO: 0 K/UL — SIGNIFICANT CHANGE UP (ref 0–0.7)
EOSINOPHIL NFR BLD AUTO: 0 % — SIGNIFICANT CHANGE UP (ref 0–8)
FIBRINOGEN PPP-MCNC: 568 MG/DL — SIGNIFICANT CHANGE UP (ref 204.4–570.6)
GAS PNL BLDV: 128 MMOL/L — LOW (ref 136–145)
GAS PNL BLDV: SIGNIFICANT CHANGE UP
GLUCOSE SERPL-MCNC: 97 MG/DL — SIGNIFICANT CHANGE UP (ref 70–99)
GLUCOSE UR QL: NEGATIVE — SIGNIFICANT CHANGE UP
HCO3 BLDV-SCNC: 21 MMOL/L — LOW (ref 22–29)
HCT VFR BLD CALC: 40.3 % — SIGNIFICANT CHANGE UP (ref 37–47)
HCT VFR BLDA CALC: 67 % — CRITICAL HIGH (ref 39–51)
HGB BLD CALC-MCNC: 22.2 G/DL — CRITICAL HIGH (ref 12.6–17.4)
HGB BLD-MCNC: 13 G/DL — SIGNIFICANT CHANGE UP (ref 12–16)
INR BLD: 1.11 RATIO — SIGNIFICANT CHANGE UP (ref 0.65–1.3)
KETONES UR-MCNC: SIGNIFICANT CHANGE UP
LACTATE BLDV-MCNC: 1.3 MMOL/L — SIGNIFICANT CHANGE UP (ref 0.5–2)
LACTATE SERPL-SCNC: 0.9 MMOL/L — SIGNIFICANT CHANGE UP (ref 0.7–2)
LDH SERPL L TO P-CCNC: 244 — HIGH (ref 50–242)
LEUKOCYTE ESTERASE UR-ACNC: NEGATIVE — SIGNIFICANT CHANGE UP
LYMPHOCYTES # BLD AUTO: 0.68 K/UL — LOW (ref 1.2–3.4)
LYMPHOCYTES # BLD AUTO: 7.8 % — LOW (ref 20.5–51.1)
MANUAL SMEAR VERIFICATION: SIGNIFICANT CHANGE UP
MCHC RBC-ENTMCNC: 30 PG — SIGNIFICANT CHANGE UP (ref 27–31)
MCHC RBC-ENTMCNC: 32.3 G/DL — SIGNIFICANT CHANGE UP (ref 32–37)
MCV RBC AUTO: 93.1 FL — SIGNIFICANT CHANGE UP (ref 81–99)
MONOCYTES # BLD AUTO: 0.46 K/UL — SIGNIFICANT CHANGE UP (ref 0.1–0.6)
MONOCYTES NFR BLD AUTO: 5.2 % — SIGNIFICANT CHANGE UP (ref 1.7–9.3)
NEUTROPHILS # BLD AUTO: 7.53 K/UL — HIGH (ref 1.4–6.5)
NEUTROPHILS NFR BLD AUTO: 86.1 % — HIGH (ref 42.2–75.2)
NITRITE UR-MCNC: NEGATIVE — SIGNIFICANT CHANGE UP
NT-PROBNP SERPL-SCNC: 3585 PG/ML — HIGH (ref 0–300)
PCO2 BLDV: 35 MMHG — LOW (ref 39–42)
PH BLDV: 7.38 — SIGNIFICANT CHANGE UP (ref 7.32–7.43)
PH UR: 5.5 — SIGNIFICANT CHANGE UP (ref 5–8)
PLAT MORPH BLD: NORMAL — SIGNIFICANT CHANGE UP
PLATELET # BLD AUTO: 149 K/UL — SIGNIFICANT CHANGE UP (ref 130–400)
PO2 BLDV: 57 MMHG — SIGNIFICANT CHANGE UP
POLYCHROMASIA BLD QL SMEAR: SLIGHT — SIGNIFICANT CHANGE UP
POTASSIUM BLDV-SCNC: 4.2 MMOL/L — SIGNIFICANT CHANGE UP (ref 3.5–5.1)
POTASSIUM SERPL-MCNC: 4.7 MMOL/L — SIGNIFICANT CHANGE UP (ref 3.5–5)
POTASSIUM SERPL-SCNC: 4.7 MMOL/L — SIGNIFICANT CHANGE UP (ref 3.5–5)
PROT SERPL-MCNC: 6.4 G/DL — SIGNIFICANT CHANGE UP (ref 6–8)
PROT UR-MCNC: SIGNIFICANT CHANGE UP
PROTHROM AB SERPL-ACNC: 12.8 SEC — SIGNIFICANT CHANGE UP (ref 9.95–12.87)
RAPID RVP RESULT: DETECTED
RBC # BLD: 4.33 M/UL — SIGNIFICANT CHANGE UP (ref 4.2–5.4)
RBC # FLD: 13.6 % — SIGNIFICANT CHANGE UP (ref 11.5–14.5)
RBC BLD AUTO: ABNORMAL
SAO2 % BLDV: 90 % — SIGNIFICANT CHANGE UP
SARS-COV-2 RNA SPEC QL NAA+PROBE: DETECTED
SODIUM SERPL-SCNC: 134 MMOL/L — LOW (ref 135–146)
SP GR SPEC: 1.02 — SIGNIFICANT CHANGE UP (ref 1.01–1.03)
TROPONIN T SERPL-MCNC: 0.01 NG/ML — SIGNIFICANT CHANGE UP
UROBILINOGEN FLD QL: SIGNIFICANT CHANGE UP
WBC # BLD: 8.75 K/UL — SIGNIFICANT CHANGE UP (ref 4.8–10.8)
WBC # FLD AUTO: 8.75 K/UL — SIGNIFICANT CHANGE UP (ref 4.8–10.8)

## 2022-07-18 PROCEDURE — 93010 ELECTROCARDIOGRAM REPORT: CPT

## 2022-07-18 PROCEDURE — 71045 X-RAY EXAM CHEST 1 VIEW: CPT | Mod: 26

## 2022-07-18 PROCEDURE — 99285 EMERGENCY DEPT VISIT HI MDM: CPT

## 2022-07-18 RX ORDER — LISINOPRIL 2.5 MG/1
1 TABLET ORAL
Qty: 0 | Refills: 0 | DISCHARGE

## 2022-07-18 RX ORDER — MEMANTINE HYDROCHLORIDE 10 MG/1
1 TABLET ORAL
Qty: 0 | Refills: 0 | DISCHARGE

## 2022-07-18 RX ORDER — ENOXAPARIN SODIUM 100 MG/ML
40 INJECTION SUBCUTANEOUS EVERY 12 HOURS
Refills: 0 | Status: DISCONTINUED | OUTPATIENT
Start: 2022-07-18 | End: 2022-07-22

## 2022-07-18 RX ORDER — LISINOPRIL 2.5 MG/1
5 TABLET ORAL DAILY
Refills: 0 | Status: DISCONTINUED | OUTPATIENT
Start: 2022-07-18 | End: 2022-07-21

## 2022-07-18 RX ORDER — LAMOTRIGINE 25 MG/1
1 TABLET, ORALLY DISINTEGRATING ORAL
Qty: 0 | Refills: 0 | DISCHARGE

## 2022-07-18 RX ORDER — SODIUM CHLORIDE 9 MG/ML
500 INJECTION, SOLUTION INTRAVENOUS ONCE
Refills: 0 | Status: COMPLETED | OUTPATIENT
Start: 2022-07-18 | End: 2022-07-18

## 2022-07-18 RX ORDER — LISINOPRIL 2.5 MG/1
10 TABLET ORAL DAILY
Refills: 0 | Status: DISCONTINUED | OUTPATIENT
Start: 2022-07-18 | End: 2022-07-19

## 2022-07-18 RX ORDER — LANOLIN ALCOHOL/MO/W.PET/CERES
3 CREAM (GRAM) TOPICAL AT BEDTIME
Refills: 0 | Status: DISCONTINUED | OUTPATIENT
Start: 2022-07-18 | End: 2022-07-22

## 2022-07-18 RX ORDER — MEMANTINE HYDROCHLORIDE 10 MG/1
10 TABLET ORAL AT BEDTIME
Refills: 0 | Status: DISCONTINUED | OUTPATIENT
Start: 2022-07-18 | End: 2022-07-22

## 2022-07-18 RX ORDER — DEXAMETHASONE 0.5 MG/5ML
6 ELIXIR ORAL ONCE
Refills: 0 | Status: COMPLETED | OUTPATIENT
Start: 2022-07-18 | End: 2022-07-18

## 2022-07-18 RX ORDER — ACETAMINOPHEN 500 MG
650 TABLET ORAL ONCE
Refills: 0 | Status: COMPLETED | OUTPATIENT
Start: 2022-07-18 | End: 2022-07-18

## 2022-07-18 RX ORDER — FOLIC ACID 7.5 MG
1 TABLET ORAL
Qty: 0 | Refills: 0 | DISCHARGE

## 2022-07-18 RX ORDER — MEMANTINE HYDROCHLORIDE 10 MG/1
5 TABLET ORAL DAILY
Refills: 0 | Status: DISCONTINUED | OUTPATIENT
Start: 2022-07-18 | End: 2022-07-22

## 2022-07-18 RX ORDER — CHLORHEXIDINE GLUCONATE 213 G/1000ML
1 SOLUTION TOPICAL
Refills: 0 | Status: DISCONTINUED | OUTPATIENT
Start: 2022-07-18 | End: 2022-07-22

## 2022-07-18 RX ORDER — LABETALOL HCL 100 MG
50 TABLET ORAL
Refills: 0 | Status: DISCONTINUED | OUTPATIENT
Start: 2022-07-18 | End: 2022-07-22

## 2022-07-18 RX ORDER — LABETALOL HCL 100 MG
0.5 TABLET ORAL
Qty: 0 | Refills: 0 | DISCHARGE

## 2022-07-18 RX ORDER — FUROSEMIDE 40 MG
20 TABLET ORAL DAILY
Refills: 0 | Status: DISCONTINUED | OUTPATIENT
Start: 2022-07-18 | End: 2022-07-22

## 2022-07-18 RX ORDER — LAMOTRIGINE 25 MG/1
100 TABLET, ORALLY DISINTEGRATING ORAL AT BEDTIME
Refills: 0 | Status: DISCONTINUED | OUTPATIENT
Start: 2022-07-18 | End: 2022-07-22

## 2022-07-18 RX ADMIN — SODIUM CHLORIDE 500 MILLILITER(S): 9 INJECTION, SOLUTION INTRAVENOUS at 14:42

## 2022-07-18 RX ADMIN — LISINOPRIL 10 MILLIGRAM(S): 2.5 TABLET ORAL at 22:53

## 2022-07-18 RX ADMIN — Medication 650 MILLIGRAM(S): at 17:54

## 2022-07-18 RX ADMIN — Medication 50 MILLIGRAM(S): at 22:53

## 2022-07-18 RX ADMIN — Medication 6 MILLIGRAM(S): at 15:50

## 2022-07-18 NOTE — H&P ADULT - NSHPREVIEWOFSYSTEMS_GEN_ALL_CORE
CONSTITUTIONAL: + weakness, fevers no weight loss   EYES/ENT: No visual changes;  No vertigo or throat pain   NECK: No pain or stiffness  RESPIRATORY: + cough productive, no wheezing, hemoptysis; No shortness of breath  CARDIOVASCULAR: No chest pain or palpitations  GASTROINTESTINAL: No abdominal or epigastric pain. No nausea, vomiting, or hematemesis; No diarrhea or constipation. No melena or hematochezia.  GENITOURINARY: No dysuria, frequency or hematuria  NEUROLOGICAL: No numbness or weakness  All other review of systems is negative unless indicated above.

## 2022-07-18 NOTE — ED PROVIDER NOTE - NS ED ROS FT
Constitutional: (+) fever, (-) chills, (-) lethargy  Eyes: (-) eye pain, (-) visual changes, (-) discharge  ENMT: (-) nasal congestion, (-) sore throat. (-) neck pain (-) neck stiffness  Respiratory: (+) cough, (+) SOB, (-) CASTILLO, (-) respiratory distress  Cardiac: (-) chest pain, (-) palpitations  GI: (-) abdominal pain, (-) nausea, (-) vomiting, (-) diarrhea.  :  (-) dysuria, (-) hematuria, (-) frequency   MS:  (-) back pain, (-) joint pain.  Neuro:  (-) headache, (-) numbness, (-) focal weakness, (-) tingling   Skin:  (-) rash  Except as documented in the HPI,  all other systems are negative

## 2022-07-18 NOTE — H&P ADULT - NSICDXPASTSURGICALHX_GEN_ALL_CORE_FT
PAST SURGICAL HISTORY:  H/O partial thyroidectomy     H/O rotator cuff tear     Status post hip surgery

## 2022-07-18 NOTE — ED PROVIDER NOTE - CLINICAL SUMMARY MEDICAL DECISION MAKING FREE TEXT BOX
I have fully discussed the medical management and delivery of care with Family. I have discussed any available labs, imaging and treatment options with the Family.  Pt admitted for further care & management.  Medical admitting team aware of pt and admission.

## 2022-07-18 NOTE — H&P ADULT - ASSESSMENT
89 yo F with PMH of dementia, HTN, HLD, type B aortic dissection 2017, AAA 4.4 cm, CAD, CKD, DVTs previously on Eliquis presenting for 2 days of cough productive of yellow sputum, fever 100.6 yesterday, and SOB, admitted for acute hypoxic respiratory failure 2/2 COVID.    #Acute hypoxic respiratory failure 2/2 COVID-19 PNA    #CAD  #Hx of grade I diastolic heart failure; r/o worsening CHF    #CKD    #Hx of DVT    #Hx of type B Aortic dissection, AAA   #HTN    #HLD    #Dementia    DVT Prophylaxis:  GI Prophylaxis:  Diet:  Activity: 88 yo F with PMH of dementia, HTN, HLD, type B aortic dissection 2017, AAA 4.4 cm, CAD, CKD, DVTs previously on Eliquis presenting for 2 days of cough productive of yellow sputum, fever 100.6 yesterday, and SOB, admitted for acute hypoxic respiratory failure 2/2 COVID.    #Acute hypoxic respiratory failure 2/2 COVID-19 PNA  - COVID positive on 7/17 per family  - On 2 L NC saturating   - D-dimer 1000  - Check procal, CRP, ferritin  - ID consult  - Lovenox 0.5 mg/kg BID     #CAD  #Hx of grade I diastolic heart failure; r/o worsening CHF  - TTE 2018 grade I DD, normal systolic function  - BNP 3585  - Check TTE    #CKD  - At baseline, monitor    #Hx of DVT  - Unknown when exactly, not on AC anymore    #Hx of type B Aortic dissection, AAA   #HTN  -     #HLD    #Dementia    DVT Prophylaxis: Lovenox 0.5 mg/kg BID  GI Prophylaxis: Not indicated  Diet: DASH  Activity: IAT 89 yo F with PMH of dementia, HTN, HLD, type B aortic dissection 2017, AAA 4.4 cm, CAD, CKD, DVTs previously on Eliquis presenting for 2 days of cough productive of yellow sputum, fever 100.6 yesterday, and SOB, admitted for acute hypoxic respiratory failure 2/2 COVID.    #Acute hypoxic respiratory failure 2/2 COVID-19 PNA  - COVID positive on 7/17 per family  - Initially on 2 L NC > now on RA saturating 90-92%  - D-dimer 1000. Check LE duplex  - Check procal, CRP, ferritin  - ID consult  - Lovenox 0.5 mg/kg BID    #CAD  #Hx of grade I diastolic heart failure; r/o worsening CHF  - TTE 2018 grade I DD, normal systolic function  - Not volume overloaded   - BNP 3585  - Check TTE    #CKD  - At baseline, monitor    #Hx of DVT  - Years ago. Daughter said she had multiple DVTs and was seeing a hematologist who stopped AC    #Hx of type B Aortic dissection, AAA   #HTN  - Cont Losartan    #HLD  - Not on statin currently    #Dementia, depression  - Cont memantine, lamotrigine    DVT Prophylaxis: Lovenox 0.5 mg/kg BID  GI Prophylaxis: Not indicated  Diet: DASH  Activity: IAT

## 2022-07-18 NOTE — PATIENT PROFILE ADULT - FALL HARM RISK - RISK INTERVENTIONS
Assistance OOB with selected safe patient handling equipment/Assistance with ambulation/Communicate Fall Risk and Risk Factors to all staff, patient, and family/Discuss with provider need for PT consult/Monitor gait and stability/Reinforce activity limits and safety measures with patient and family/Visual Cue: Yellow wristband/Bed in lowest position, wheels locked, appropriate side rails in place/Call bell, personal items and telephone in reach/Instruct patient to call for assistance before getting out of bed or chair/Non-slip footwear when patient is out of bed/[place] to call system/Physically safe environment - no spills, clutter or unnecessary equipment/Purposeful Proactive Rounding/Room/bathroom lighting operational, light cord in reach

## 2022-07-18 NOTE — ED PROVIDER NOTE - CARE PLAN
Assessment and plan of treatment:	Plan: Monitor, EKG, CXR, labs, reassess.   1 Principal Discharge DX:	COVID-19  Assessment and plan of treatment:	Plan: Monitor, EKG, CXR, labs, reassess.  Secondary Diagnosis:	Acute respiratory failure with hypoxia  Secondary Diagnosis:	Elevated brain natriuretic peptide (BNP) level

## 2022-07-18 NOTE — ED PROVIDER NOTE - ATTENDING CONTRIBUTION TO CARE
56-year-old female with past medical history of ype B aortic dissection diagnosed 2/2017, as well as ascending aortic aneurysm 4.4 cm (unchanged from 2012), HTN, CAD, CKD, DVT's no longer on AC, HLD, presents with 2 days of fever T-max 100.6 yesterday, productive cough yellowish in color, no shortness of breath, today was found to be hypoxic to 83-86 on room air so was brought to the emergency department.  Per family patient has a 24-hour nurse aide, last saw patient on Thursday approximately 4 days ago, and nurse aide was tested positive Friday for COVID.  She was tested positive for COVID yesterday.  Patient is COVID vaccinated and had the booster.  History provided by family at bedside as patient is a poor historian, no fall or trauma.  No v, cp,  diarrhea,  trauma, weakness, recent travel or rash.    Vital Signs: I have reviewed the initial vital signs. Constitutional: Non toxic appearing pt sitting on stretcher speaking full sentences. Integumentary: No rash. ENT: MMM NECK: Supple, non-tender, no meningeal signs. Cardiovascular: RRR, radial pulses 2/4 b/l. No JVD. Respiratory: Congested, BS present b/l, no wheezing or crackles, poor air exchange,  no accessory muscle use, no stridor. Gastrointestinal: BS present throughout all 4 quadrants, soft, nd, nt, no rebound tenderness or guarding, no cvat. Musculoskeletal: FROM,  no calf pain/swelling/erythema. Neurologic: Awake and alert, no focal deficits.

## 2022-07-18 NOTE — ED PROVIDER NOTE - PHYSICAL EXAMINATION
CONSTITUTIONAL: well-appearing, in NAD  SKIN: Warm dry, normal skin turgor  HEAD: NCAT  EYES: EOMI, PERRLA, no scleral icterus, conjunctiva pink  ENT: normal pharynx with no erythema or exudates  NECK: Supple; non tender. Full ROM.  CARD: RRR, no murmurs.  RESP: clear to ausculation b/l. No crackles or wheezing. +transmitted upper airway sounds  ABD: soft, non-tender, non-distended, no rebound or guarding.  EXT: Full ROM, no bony tenderness, no pedal edema, no calf tenderness  NEURO: normal motor. normal sensory.   PSYCH: Cooperative, appropriate.

## 2022-07-18 NOTE — H&P ADULT - NSICDXPASTMEDICALHX_GEN_ALL_CORE_FT
PAST MEDICAL HISTORY:  Dementia     Dyslipidemia     History of deep vein thrombosis     Hypertension, unspecified type     Thoracic aortic dissection

## 2022-07-18 NOTE — H&P ADULT - NSHPLABSRESULTS_GEN_ALL_CORE
LABS/RADIOLOGY RESULTS:                          13.0   8.75  )-----------( 149      ( 18 Jul 2022 14:09 )             40.3   07-18    134<L>  |  99  |  32<H>  ----------------------------<  97  4.7   |  19  |  1.6<H>    Ca    9.6      18 Jul 2022 14:09    TPro  6.4  /  Alb  4.1  /  TBili  0.6  /  DBili  x   /  AST  20  /  ALT  12  /  AlkPhos  59  07-18  PT/INR - ( 18 Jul 2022 14:09 )   PT: 12.80 sec;   INR: 1.11 ratio         PTT - ( 18 Jul 2022 14:09 )  PTT:29.7 secBlood Cultures

## 2022-07-18 NOTE — ED ADULT TRIAGE NOTE - CHIEF COMPLAINT QUOTE
Patient presents to ED c/o shortness of breath and fever. Family states that patient has positive at home covid test

## 2022-07-18 NOTE — ED PROVIDER NOTE - PROGRESS NOTE DETAILS
ED Attending GLORIA Ward  BUN/creatinine 32/1.6, previous to compare was 36/1.7.  Extensive conversation had with family, aware of all results with copy provided, agree with plan for admission.  Will discuss with medical admitting team and admit.

## 2022-07-18 NOTE — H&P ADULT - ATTENDING COMMENTS
89 yo F with PMH of dementia, HTN, HLD, type B aortic dissection 2017, AAA 4.4 cm, CAD, CKD, DVTs previously on Eliquis presenting for 2 days of cough productive of yellow sputum, fever 100.6 yesterday, and SOB, admitted for acute hypoxic respiratory failure 2/2 COVID.    Pt was seen 7/19    Vital Signs Last 24 Hrs  T(F): 96.6 (19 Jul 2022 13:32), Max: 98.8 (18 Jul 2022 18:39)  HR: 79 (19 Jul 2022 13:32) (51 - 79)  BP: 144/88 (19 Jul 2022 13:32) (131/67 - 176/70)  RR: 20 (19 Jul 2022 13:32) (18 - 20)  SpO2: 95% (19 Jul 2022 11:36) (87% - 95%)    PHYSICAL EXAM:  GENERAL: NAD, well-groomed, well-developed  HEAD:  Atraumatic, Normocephalic  EYES: EOMI, conjunctiva and sclera clear  ENMT: Moist mucous membranes, Good dentition, no thrush  NECK: Supple, No JVD  CHEST/LUNG: diminished breath sounds no wheezing or crackles   HEART: RRR; S1/S2, 2/6 systolic murmur  ABDOMEN: Soft, Nontender, Nondistended; Bowel sounds present  VASCULAR: Normal pulses, Normal capillary refill  EXTREMITIES: No calf tenderness, No cyanosis, No edema  LYMPH: Normal; No lymphadenopathy noted  SKIN: Warm, Intact  PSYCH: Normal mood, Normal affect  NERVOUS SYSTEM:  A/O x1, poor concentration; CN 2-12 intact, No focal deficits    #Acute hypoxic respiratory failure 2/2 COVID-19 PNA  - COVID positive on 7/17 per family  - Found on RA saturating 82%, now requiring 4L for 94%  - D-dimer 1000. Check LE duplex  - CRP, ferritin, LDL all elevated, plan to trend q72hrs   - ID consult appreciated  - dexamethasone 6mg IV [id number] (7/19-7/29)  - Remdesivir started 7/19  - Lovenox 0.5 mg/kg BID    #CAD  #Hx of grade I diastolic heart failure; r/o worsening CHF  - TTE 2018 grade I DD, normal systolic function  - Not volume overloaded   - BNP 3585  - Check TTE    #CKD  - At baseline, monitor    #Hx of DVT  - Years ago. Daughter said she had multiple DVTs and was seeing a hematologist who stopped AC    #Hx of type B Aortic dissection, AAA   #HTN  - Cont Losartan    #HLD  - Not on statin currently    #Dementia, depression  - Cont memantine, lamotrigine    DVT Prophylaxis: Lovenox 0.5 mg/kg BID  GI Prophylaxis: Not indicated  Diet: DASH  Activity: IAT  Pending: resp status, monitor for covid delirium  Code status: dnr/dni Molst completed 7/19  Family: 7/19 daughter updated

## 2022-07-18 NOTE — H&P ADULT - HISTORY OF PRESENT ILLNESS
88 yo F with PMH of dementia, HTN, HLD, type B aortic dissection 2017, AAA 4.4 cm, CAD, CKD, DVTs previously on Eliquis presenting for 2 days of cough productive of yellow sputum, fever 100.6 yesterday, and SOB. Patient became hypoxic to 83 at home today, prompting family to bring her in. Patient is double vaccinated and boosted against COVID, but tested positive yesterday after home health nurse who she saw 4 days ago tested positive 3 days ago.     ED VS: T(F): , Max: 100.4 HR:  59 BP:  142/62 RR: 18 SpO2:  92% on NRB but later switched to 2 L NC    Labs: COVID +, BNP 3600, d-dimer 1025, anion gap 16, lactate 0.9  CXR with b/l peripheral opacities and small left pleural effusion  Pt received dexamethasone 6 mg, LR bolus 500 cc  Pt is admitted for workup/management   89 yo F with PMH of dementia, HTN, HLD, type B aortic dissection 2017, AAA 4.4 cm, CAD, CKD, multiple LE DVTs no longer on AC for years per her hematologist presenting for 2 days of productive cough usually clear occasionally yellow, fever 100.3 yesterday, and SOB. Patient became hypoxic to 83 at home today, prompting family to bring her in. She tested positive for COVID yesterday after was exposed to her home health aide 48-72 hours before who then tested positive. Patient is double vaccinated and boosted against COVID, nonsmoker. Denies chest pain, shortness of breath, dizziness, nausea, vomiting, diarrhea, hemoptysis, palpitations.    ED VS: T(F): , Max: 100.4 HR:  59 BP:  142/62 RR: 18 SpO2:  92% 2 L NC > now on RA saturating 90-92%    Labs: COVID +, BNP 3600, d-dimer 1025, anion gap 16, lactate 0.9  CXR with b/l peripheral opacities and small left pleural effusion  Pt received dexamethasone 6 mg, LR bolus 500 cc

## 2022-07-18 NOTE — ED PROVIDER NOTE - OBJECTIVE STATEMENT
88 yo F with PMH of dementia, HTN, HLD, type B aortic dissection 2017, AAA 4.4 cm, CAD, CKD, DVTs previously on eliquis, presenting for 2 days of cough productive of yellow sputum, fever, Tmax 100.6 yesterday, and SOB. Patient became hypoxic to 83 at home today, prompting family to bring her in. Patient is double vaccinated and boosted against covid, but tested positive yesterday after home health nurse who she saw 4 days ago tested positive 3 days ago. Patient is never smoker. No recent travel, CP, NVD, dysuria, hematuria, melena, hematochezia.

## 2022-07-19 LAB
ALBUMIN SERPL ELPH-MCNC: 3.9 G/DL — SIGNIFICANT CHANGE UP (ref 3.5–5.2)
ALBUMIN SERPL ELPH-MCNC: 4 G/DL — SIGNIFICANT CHANGE UP (ref 3.5–5.2)
ALP SERPL-CCNC: 68 U/L — SIGNIFICANT CHANGE UP (ref 30–115)
ALP SERPL-CCNC: 74 U/L — SIGNIFICANT CHANGE UP (ref 30–115)
ALT FLD-CCNC: 14 U/L — SIGNIFICANT CHANGE UP (ref 0–41)
ALT FLD-CCNC: 16 U/L — SIGNIFICANT CHANGE UP (ref 0–41)
ANION GAP SERPL CALC-SCNC: 17 MMOL/L — HIGH (ref 7–14)
AST SERPL-CCNC: 15 U/L — SIGNIFICANT CHANGE UP (ref 0–41)
AST SERPL-CCNC: 17 U/L — SIGNIFICANT CHANGE UP (ref 0–41)
BASOPHILS # BLD AUTO: 0 K/UL — SIGNIFICANT CHANGE UP (ref 0–0.2)
BASOPHILS NFR BLD AUTO: 0 % — SIGNIFICANT CHANGE UP (ref 0–1)
BILIRUB DIRECT SERPL-MCNC: 0.2 MG/DL — SIGNIFICANT CHANGE UP (ref 0–0.3)
BILIRUB INDIRECT FLD-MCNC: 0.2 MG/DL — SIGNIFICANT CHANGE UP (ref 0.2–1.2)
BILIRUB SERPL-MCNC: 0.4 MG/DL — SIGNIFICANT CHANGE UP (ref 0.2–1.2)
BILIRUB SERPL-MCNC: 0.4 MG/DL — SIGNIFICANT CHANGE UP (ref 0.2–1.2)
BUN SERPL-MCNC: 37 MG/DL — HIGH (ref 10–20)
CALCIUM SERPL-MCNC: 9.6 MG/DL — SIGNIFICANT CHANGE UP (ref 8.5–10.1)
CHLORIDE SERPL-SCNC: 102 MMOL/L — SIGNIFICANT CHANGE UP (ref 98–110)
CO2 SERPL-SCNC: 19 MMOL/L — SIGNIFICANT CHANGE UP (ref 17–32)
CREAT SERPL-MCNC: 1.4 MG/DL — SIGNIFICANT CHANGE UP (ref 0.7–1.5)
CREAT SERPL-MCNC: 1.5 MG/DL — SIGNIFICANT CHANGE UP (ref 0.7–1.5)
EGFR: 33 ML/MIN/1.73M2 — LOW
EGFR: 36 ML/MIN/1.73M2 — LOW
EOSINOPHIL # BLD AUTO: 0 K/UL — SIGNIFICANT CHANGE UP (ref 0–0.7)
EOSINOPHIL NFR BLD AUTO: 0 % — SIGNIFICANT CHANGE UP (ref 0–8)
FERRITIN SERPL-MCNC: 438 NG/ML — HIGH (ref 15–150)
GLUCOSE SERPL-MCNC: 136 MG/DL — HIGH (ref 70–99)
HCT VFR BLD CALC: 43.6 % — SIGNIFICANT CHANGE UP (ref 37–47)
HGB BLD-MCNC: 14.1 G/DL — SIGNIFICANT CHANGE UP (ref 12–16)
IMM GRANULOCYTES NFR BLD AUTO: 0.3 % — SIGNIFICANT CHANGE UP (ref 0.1–0.3)
INR BLD: 1.1 RATIO — SIGNIFICANT CHANGE UP (ref 0.65–1.3)
LYMPHOCYTES # BLD AUTO: 0.65 K/UL — LOW (ref 1.2–3.4)
LYMPHOCYTES # BLD AUTO: 6.5 % — LOW (ref 20.5–51.1)
MAGNESIUM SERPL-MCNC: 1.9 MG/DL — SIGNIFICANT CHANGE UP (ref 1.8–2.4)
MCHC RBC-ENTMCNC: 29.7 PG — SIGNIFICANT CHANGE UP (ref 27–31)
MCHC RBC-ENTMCNC: 32.3 G/DL — SIGNIFICANT CHANGE UP (ref 32–37)
MCV RBC AUTO: 92 FL — SIGNIFICANT CHANGE UP (ref 81–99)
MONOCYTES # BLD AUTO: 0.75 K/UL — HIGH (ref 0.1–0.6)
MONOCYTES NFR BLD AUTO: 7.5 % — SIGNIFICANT CHANGE UP (ref 1.7–9.3)
NEUTROPHILS # BLD AUTO: 8.51 K/UL — HIGH (ref 1.4–6.5)
NEUTROPHILS NFR BLD AUTO: 85.7 % — HIGH (ref 42.2–75.2)
NRBC # BLD: 0 /100 WBCS — SIGNIFICANT CHANGE UP (ref 0–0)
PLATELET # BLD AUTO: 146 K/UL — SIGNIFICANT CHANGE UP (ref 130–400)
POTASSIUM SERPL-MCNC: 4.7 MMOL/L — SIGNIFICANT CHANGE UP (ref 3.5–5)
POTASSIUM SERPL-SCNC: 4.7 MMOL/L — SIGNIFICANT CHANGE UP (ref 3.5–5)
PROCALCITONIN SERPL-MCNC: 0.18 NG/ML — HIGH (ref 0.02–0.1)
PROT SERPL-MCNC: 6.3 G/DL — SIGNIFICANT CHANGE UP (ref 6–8)
PROT SERPL-MCNC: 6.4 G/DL — SIGNIFICANT CHANGE UP (ref 6–8)
PROTHROM AB SERPL-ACNC: 12.6 SEC — SIGNIFICANT CHANGE UP (ref 9.95–12.87)
RBC # BLD: 4.74 M/UL — SIGNIFICANT CHANGE UP (ref 4.2–5.4)
RBC # FLD: 13.6 % — SIGNIFICANT CHANGE UP (ref 11.5–14.5)
SODIUM SERPL-SCNC: 138 MMOL/L — SIGNIFICANT CHANGE UP (ref 135–146)
WBC # BLD: 9.94 K/UL — SIGNIFICANT CHANGE UP (ref 4.8–10.8)
WBC # FLD AUTO: 9.94 K/UL — SIGNIFICANT CHANGE UP (ref 4.8–10.8)

## 2022-07-19 PROCEDURE — 99223 1ST HOSP IP/OBS HIGH 75: CPT

## 2022-07-19 RX ORDER — LISINOPRIL 2.5 MG/1
10 TABLET ORAL AT BEDTIME
Refills: 0 | Status: DISCONTINUED | OUTPATIENT
Start: 2022-07-19 | End: 2022-07-21

## 2022-07-19 RX ORDER — REMDESIVIR 5 MG/ML
100 INJECTION INTRAVENOUS EVERY 24 HOURS
Refills: 0 | Status: DISCONTINUED | OUTPATIENT
Start: 2022-07-20 | End: 2022-07-22

## 2022-07-19 RX ORDER — REMDESIVIR 5 MG/ML
200 INJECTION INTRAVENOUS EVERY 24 HOURS
Refills: 0 | Status: COMPLETED | OUTPATIENT
Start: 2022-07-19 | End: 2022-07-19

## 2022-07-19 RX ORDER — DEXAMETHASONE 0.5 MG/5ML
6 ELIXIR ORAL DAILY
Refills: 0 | Status: DISCONTINUED | OUTPATIENT
Start: 2022-07-19 | End: 2022-07-22

## 2022-07-19 RX ORDER — REMDESIVIR 5 MG/ML
INJECTION INTRAVENOUS
Refills: 0 | Status: DISCONTINUED | OUTPATIENT
Start: 2022-07-19 | End: 2022-07-22

## 2022-07-19 RX ADMIN — Medication 50 MILLIGRAM(S): at 05:55

## 2022-07-19 RX ADMIN — Medication 6 MILLIGRAM(S): at 11:59

## 2022-07-19 RX ADMIN — LAMOTRIGINE 100 MILLIGRAM(S): 25 TABLET, ORALLY DISINTEGRATING ORAL at 21:42

## 2022-07-19 RX ADMIN — REMDESIVIR 500 MILLIGRAM(S): 5 INJECTION INTRAVENOUS at 17:27

## 2022-07-19 RX ADMIN — MEMANTINE HYDROCHLORIDE 10 MILLIGRAM(S): 10 TABLET ORAL at 21:42

## 2022-07-19 RX ADMIN — LISINOPRIL 5 MILLIGRAM(S): 2.5 TABLET ORAL at 05:55

## 2022-07-19 RX ADMIN — CHLORHEXIDINE GLUCONATE 1 APPLICATION(S): 213 SOLUTION TOPICAL at 05:52

## 2022-07-19 RX ADMIN — LISINOPRIL 10 MILLIGRAM(S): 2.5 TABLET ORAL at 21:42

## 2022-07-19 RX ADMIN — ENOXAPARIN SODIUM 40 MILLIGRAM(S): 100 INJECTION SUBCUTANEOUS at 17:26

## 2022-07-19 RX ADMIN — Medication 50 MILLIGRAM(S): at 17:25

## 2022-07-19 RX ADMIN — MEMANTINE HYDROCHLORIDE 5 MILLIGRAM(S): 10 TABLET ORAL at 11:55

## 2022-07-19 RX ADMIN — ENOXAPARIN SODIUM 40 MILLIGRAM(S): 100 INJECTION SUBCUTANEOUS at 05:55

## 2022-07-19 NOTE — CONSULT NOTE ADULT - SUBJECTIVE AND OBJECTIVE BOX
SCAR DING  89y, Female  Allergy: NSAIDs (Hives; Urticaria)      All historical available data reviewed.    HPI:  88 yo F with PMH of dementia, HTN, HLD, type B aortic dissection 2017, AAA 4.4 cm, CAD, CKD, multiple LE DVTs no longer on AC for years per her hematologist presenting for 2 days of productive cough usually clear occasionally yellow, fever 100.3 yesterday, and SOB. Patient became hypoxic to 83 at home today, prompting family to bring her in. She tested positive for COVID yesterday after was exposed to her home health aide 48-72 hours before who then tested positive. Patient is double vaccinated and boosted against COVID, nonsmoker. Denies chest pain, shortness of breath, dizziness, nausea, vomiting, diarrhea, hemoptysis, palpitations.    ED VS: T(F): , Max: 100.4 HR:  59 BP:  142/62 RR: 18 SpO2:  92% 2 L NC > now on RA saturating 90-92%    Labs: COVID +, BNP 3600, d-dimer 1025, anion gap 16, lactate 0.9  CXR with b/l peripheral opacities and small left pleural effusion  Pt received dexamethasone 6 mg, LR bolus 500 cc     (2022 16:58)    FAMILY HISTORY:    PAST MEDICAL & SURGICAL HISTORY:  Hypertension, unspecified type      History of deep vein thrombosis      Thoracic aortic dissection      Dementia      Dyslipidemia      H/O partial thyroidectomy      H/O rotator cuff tear      Status post hip surgery            VITALS:  T(F): 98.6, Max: 100.4 (22 @ 14:44)  HR: 52  BP: 140/78  RR: 18Vital Signs Last 24 Hrs  T(C): 37 (2022 04:58), Max: 38 (2022 14:44)  T(F): 98.6 (2022 04:58), Max: 100.4 (2022 14:44)  HR: 52 (2022 11:36) (51 - 66)  BP: 140/78 (2022 04:58) (131/67 - 176/70)  BP(mean): --  RR: 18 (2022 11:36) (18 - 20)  SpO2: 95% (2022 11:36) (87% - 95%)    Parameters below as of 2022 11:36  Patient On (Oxygen Delivery Method): nasal cannula  O2 Flow (L/min): 2      TESTS & MEASUREMENTS:                        14.1   9.94  )-----------( 146      ( 2022 07:14 )             43.6         138  |  102  |  37<H>  ----------------------------<  136<H>  4.7   |  19  |  1.5    Ca    9.6      2022 07:14  Mg     1.9         TPro  6.3  /  Alb  4.0  /  TBili  0.4  /  DBili  x   /  AST  15  /  ALT  16  /  AlkPhos  68      LIVER FUNCTIONS - ( 2022 07:14 )  Alb: 4.0 g/dL / Pro: 6.3 g/dL / ALK PHOS: 68 U/L / ALT: 16 U/L / AST: 15 U/L / GGT: x             Urinalysis Basic - ( 2022 18:20 )    Color: Light Yellow / Appearance: Clear / S.017 / pH: x  Gluc: x / Ketone: Trace  / Bili: Negative / Urobili: <2 mg/dL   Blood: x / Protein: Trace / Nitrite: Negative   Leuk Esterase: Negative / RBC: x / WBC x   Sq Epi: x / Non Sq Epi: x / Bacteria: x          RADIOLOGY & ADDITIONAL TESTS:  Personal review of radiological diagnostics performed  Echo and EKG results noted when applicable.     MEDICATIONS:  chlorhexidine 4% Liquid 1 Application(s) Topical <User Schedule>  dexAMETHasone  Injectable 6 milliGRAM(s) IV Push daily  enoxaparin Injectable 40 milliGRAM(s) SubCutaneous every 12 hours  furosemide    Tablet 20 milliGRAM(s) Oral daily  labetalol 50 milliGRAM(s) Oral two times a day  lamoTRIgine 100 milliGRAM(s) Oral at bedtime  lisinopril 5 milliGRAM(s) Oral daily  lisinopril 10 milliGRAM(s) Oral at bedtime  melatonin 3 milliGRAM(s) Oral at bedtime PRN  memantine 10 milliGRAM(s) Oral at bedtime  memantine 5 milliGRAM(s) Oral daily      ANTIBIOTICS:

## 2022-07-19 NOTE — PHYSICAL THERAPY INITIAL EVALUATION ADULT - PERTINENT HX OF CURRENT PROBLEM, REHAB EVAL
88 yo F with PMH of dementia, HTN, HLD, type B aortic dissection 2017, AAA 4.4 cm, CAD, CKD, DVTs previously on Eliquis presenting for 2 days of cough productive of yellow sputum, fever 100.6 yesterday, and SOB, admitted for acute hypoxic respiratory failure 2/2 COVID

## 2022-07-19 NOTE — PROGRESS NOTE ADULT - SUBJECTIVE AND OBJECTIVE BOX
SCAR DING 89y Female  MRN#: [id number]     SUBJECTIVE  Patient is a 89y old Female who presents with a chief complaint of COVID (2022 16:58)  Currently admitted to medicine with the primary diagnosis of COVID-19    Today is hospital day 1d, and this morning she appeared well on exam and had no complaints. Patient was only alert and oriented to self. No acute events overnight.      OBJECTIVE  PAST MEDICAL & SURGICAL HISTORY  Hypertension, unspecified type  History of deep vein thrombosis  Thoracic aortic dissection  Dementia  Dyslipidemia  H/O partial thyroidectomy  H/O rotator cuff tear  Status post hip surgery      ALLERGIES:  NSAIDs (Hives; Urticaria)    MEDICATIONS:  STANDING MEDICATIONS  chlorhexidine 4% Liquid 1 Application(s) Topical <User Schedule>  enoxaparin Injectable 40 milliGRAM(s) SubCutaneous every 12 hours  furosemide    Tablet 20 milliGRAM(s) Oral daily  labetalol 50 milliGRAM(s) Oral two times a day  lamoTRIgine 100 milliGRAM(s) Oral at bedtime  lisinopril 5 milliGRAM(s) Oral daily  lisinopril 10 milliGRAM(s) Oral at bedtime  memantine 10 milliGRAM(s) Oral at bedtime  memantine 5 milliGRAM(s) Oral daily    PRN MEDICATIONS  melatonin 3 milliGRAM(s) Oral at bedtime PRN      VITAL SIGNS: Last 24 Hours  T(C): 37 (2022 04:58), Max: 38 (2022 14:44)  T(F): 98.6 (2022 04:58), Max: 100.4 (2022 14:44)  HR: 60 (2022 04:58) (58 - 66)  BP: 140/78 (2022 04:58) (131/67 - 176/70)  BP(mean): --  RR: 18 (2022 04:58) (18 - 20)  SpO2: 94% (2022 23:00) (92% - 94%)    LABS:                        14.1   9.94  )-----------( 146      ( 2022 07:14 )             43.6     07-19    138  |  102  |  37<H>  ----------------------------<  136<H>  4.7   |  19  |  1.5    Ca    9.6      2022 07:14  Mg     1.9         TPro  6.3  /  Alb  4.0  /  TBili  0.4  /  DBili  x   /  AST  15  /  ALT  16  /  AlkPhos  68  -    PT/INR - ( 2022 14:09 )   PT: 12.80 sec;   INR: 1.11 ratio         PTT - ( 2022 14:09 )  PTT:29.7 sec  Urinalysis Basic - ( 2022 18:20 )    Color: Light Yellow / Appearance: Clear / S.017 / pH: x  Gluc: x / Ketone: Trace  / Bili: Negative / Urobili: <2 mg/dL   Blood: x / Protein: Trace / Nitrite: Negative   Leuk Esterase: Negative / RBC: x / WBC x   Sq Epi: x / Non Sq Epi: x / Bacteria: x        Troponin T, Serum: 0.01 ng/mL (22 @ 14:09)  Lactate, Blood: 0.9 mmol/L (22 @ 14:04)      CARDIAC MARKERS ( 2022 14:09 )  x     / 0.01 ng/mL / x     / x     / x          RADIOLOGY:   Xray Chest 1 View-PORTABLE IMMEDIATE (22 @ 13:15) >  1.  Bilateral perihilar opacities.  2.  Small right pleural effusion      PHYSICAL EXAM:  GENERAL: NAD, well-developed, AAOx1 to self  HEENT:  Atraumatic, Normocephalic. EOMI, PERRLA, conjunctiva and sclera clear, No JVD  PULMONARY: Ronchi noted on exam in apexes and bases, patient took shallow breaths  CARDIOVASCULAR: Regular rate and rhythm; No murmurs, rubs, or gallops  GASTROINTESTINAL: Soft, Nontender, Nondistended; Bowel sounds present  MUSCULOSKELETAL:  2+ Peripheral Pulses, No clubbing, cyanosis, or edema  NEUROLOGY: non-focal, normal sensory and movement  SKIN: No rashes or lesions      ADMISSION SUMMARY  Patient is a 89y old Female who presents with a chief complaint of COVID (2022 16:58)  Currently admitted to medicine with the primary diagnosis of COVID-19    ASSESSMENT & PLAN  88 yo F with PMH of dementia, HTN, HLD, type B aortic dissection 2017, AAA 4.4 cm, CAD, CKD, DVTs previously on Eliquis presenting for 2 days of cough productive of yellow sputum, fever 100.6 yesterday, and SOB, admitted for acute hypoxic respiratory failure 2/2 COVID.    #Acute hypoxic respiratory failure 2/2 COVID-19 PNA  - COVID positive on  per family  - Initially on 2 L NC > now on RA saturating 90-92%  - D-dimer 1000. Check LE duplex  - Lovenox 0.5 mg/kg BID, Cr clearance of 29  - Patient will be attempted to be off nasal cannula to see if she tolerates    #CAD  #Hx of grade I diastolic heart failure; r/o worsening CHF  - TTE 2018 grade I DD, normal systolic function  - Not volume overloaded   - BNP 3585  - Check TTE    #CKD  - At baseline, monitor    #Hx of DVT  - Years ago. Daughter said she had multiple DVTs and was seeing a hematologist who stopped AC    #Hx of type B Aortic dissection, AAA   #HTN  - Cont Losartan    #HLD  - Not on statin currently    #Dementia, depression  - Cont memantine, lamotrigine    #Code  - Patient's daughter reported earlier DNR/DNI and will bring in MOLST    DVT Prophylaxis: Lovenox 0.5 mg/kg BID  GI Prophylaxis: Not indicated  Diet: DASH  Activity: PT consult, out of bed

## 2022-07-19 NOTE — PHYSICAL THERAPY INITIAL EVALUATION ADULT - LEVEL OF CONSCIOUSNESS, REHAB EVAL
conducted a pre-surgery visit with Annia Welch, who is a 34 y.o.,female. The  provided the following Interventions:  Initiated a relationship of care and support. Offered prayer and assurance of continued prayers on patient's behalf. There is no advance directive for this patient. Plan:  Chaplains will continue to follow and will provide pastoral care on an as needed/requested basis.  recommends bedside caregivers page  on duty if patient shows signs of acute spiritual or emotional distress.   Reiseñor 3   Board Certified [address] [place] [address]   [phone] confused

## 2022-07-19 NOTE — PHYSICAL THERAPY INITIAL EVALUATION ADULT - GAIT TRAINING, PT EVAL
Goal: pt will ambulate w/ rolling walker 25 feet with min assist by discharge to facilitate return to PLOF.

## 2022-07-19 NOTE — PHYSICAL THERAPY INITIAL EVALUATION ADULT - ORIENTATION, REHAB EVAL
pt offered her maiden name as her name. when asked what her 's last name is, she said "Denaro"/person

## 2022-07-19 NOTE — PHYSICAL THERAPY INITIAL EVALUATION ADULT - TRANSFER TRAINING, PT EVAL
04-Apr-2019 17:15 Goal: Pt will come sit to stand to sit with min assist by discharge to facilitate return to PLOF.

## 2022-07-19 NOTE — CONSULT NOTE ADULT - ASSESSMENT
Pt received dexamethasone 6 mg, LR bolus 500 Counseled patient about diagnostic testing and treatment plan. All questions answered.aortic dissection 2017, AAA 4.4 cm, CAD, CKD, multiple LE DVTs no longer on AC for years per her hematologist presenting for 2 days of productive cough usually clear occasionally yellow, fever 100.3 yesterday, and SOB. Patient became hypoxic to 83 at home today, prompting family to bring her in. She tested positive for COVID yesterday after was exposed to her home health aide 48-72 hours before who then tested positive. Patient is double vaccinated and boosted against COVID, nonsmoker. Denies chest pain, shortness of breath, dizziness, nausea, vomiting, diarrhea, hemoptysis, palpitations.    ED VS: T(F): , Max: 100.4 HR:  59 BP:  142/62 RR: 18 SpO2:  92% 2 L NC > now on RA saturating 90-92%    Labs: COVID +, BNP 3600, d-dimer 1025, anion gap 16, lactate 0.9  CXR with b/l peripheral opacities and small left pleural effusion  Pt received dexamethasone 6 mg, LR bolus 500 cc    IMPRESSION;  COVID 19 with severe illness. SpO2 < 94% on RA and need for supplemental O2.  Pt is in the early viral replicative phase based on the timeline/onset of symptoms.  CXR no significant GGO  Inflammatory markers ar elelevated but clinically and based on CXR no cytokine storm    RECOMMENDATIONS;  Target SpO2 92 % to 96 %  Day 1 : Single  mg IV loading dose  Day 2-5 : single  mg IV once daily maintenance dose  Daily CBC,CMP.   Dexamethasone 6 mg iv q24h for 10 days.  Monitor for side effects: hyperglycemia, neurological ( agitation/confusion), adrenal suppression, bacterial and fungal infections

## 2022-07-19 NOTE — PHYSICAL THERAPY INITIAL EVALUATION ADULT - GAIT DEVIATIONS NOTED, PT EVAL
decreased pablo/increased time in double stance/decreased velocity of limb motion/decreased step length/decreased stride length/increased stride width

## 2022-07-20 LAB
ALBUMIN SERPL ELPH-MCNC: 3.6 G/DL — SIGNIFICANT CHANGE UP (ref 3.5–5.2)
ALBUMIN SERPL ELPH-MCNC: 3.8 G/DL — SIGNIFICANT CHANGE UP (ref 3.5–5.2)
ALP SERPL-CCNC: 66 U/L — SIGNIFICANT CHANGE UP (ref 30–115)
ALP SERPL-CCNC: 66 U/L — SIGNIFICANT CHANGE UP (ref 30–115)
ALT FLD-CCNC: 12 U/L — SIGNIFICANT CHANGE UP (ref 0–41)
ALT FLD-CCNC: 13 U/L — SIGNIFICANT CHANGE UP (ref 0–41)
ANION GAP SERPL CALC-SCNC: 13 MMOL/L — SIGNIFICANT CHANGE UP (ref 7–14)
AST SERPL-CCNC: 15 U/L — SIGNIFICANT CHANGE UP (ref 0–41)
AST SERPL-CCNC: 15 U/L — SIGNIFICANT CHANGE UP (ref 0–41)
BASOPHILS # BLD AUTO: 0.01 K/UL — SIGNIFICANT CHANGE UP (ref 0–0.2)
BASOPHILS NFR BLD AUTO: 0.1 % — SIGNIFICANT CHANGE UP (ref 0–1)
BILIRUB DIRECT SERPL-MCNC: 0.2 MG/DL — SIGNIFICANT CHANGE UP (ref 0–0.3)
BILIRUB INDIRECT FLD-MCNC: 0.1 MG/DL — LOW (ref 0.2–1.2)
BILIRUB SERPL-MCNC: 0.3 MG/DL — SIGNIFICANT CHANGE UP (ref 0.2–1.2)
BILIRUB SERPL-MCNC: 0.3 MG/DL — SIGNIFICANT CHANGE UP (ref 0.2–1.2)
BUN SERPL-MCNC: 41 MG/DL — HIGH (ref 10–20)
CALCIUM SERPL-MCNC: 9.5 MG/DL — SIGNIFICANT CHANGE UP (ref 8.5–10.1)
CHLORIDE SERPL-SCNC: 104 MMOL/L — SIGNIFICANT CHANGE UP (ref 98–110)
CO2 SERPL-SCNC: 22 MMOL/L — SIGNIFICANT CHANGE UP (ref 17–32)
CREAT SERPL-MCNC: 1.3 MG/DL — SIGNIFICANT CHANGE UP (ref 0.7–1.5)
EGFR: 39 ML/MIN/1.73M2 — LOW
EOSINOPHIL # BLD AUTO: 0 K/UL — SIGNIFICANT CHANGE UP (ref 0–0.7)
EOSINOPHIL NFR BLD AUTO: 0 % — SIGNIFICANT CHANGE UP (ref 0–8)
GLUCOSE SERPL-MCNC: 127 MG/DL — HIGH (ref 70–99)
HCT VFR BLD CALC: 40.1 % — SIGNIFICANT CHANGE UP (ref 37–47)
HGB BLD-MCNC: 13.1 G/DL — SIGNIFICANT CHANGE UP (ref 12–16)
IMM GRANULOCYTES NFR BLD AUTO: 0.4 % — HIGH (ref 0.1–0.3)
LYMPHOCYTES # BLD AUTO: 0.56 K/UL — LOW (ref 1.2–3.4)
LYMPHOCYTES # BLD AUTO: 4.2 % — LOW (ref 20.5–51.1)
MAGNESIUM SERPL-MCNC: 1.8 MG/DL — SIGNIFICANT CHANGE UP (ref 1.8–2.4)
MCHC RBC-ENTMCNC: 29.6 PG — SIGNIFICANT CHANGE UP (ref 27–31)
MCHC RBC-ENTMCNC: 32.7 G/DL — SIGNIFICANT CHANGE UP (ref 32–37)
MCV RBC AUTO: 90.7 FL — SIGNIFICANT CHANGE UP (ref 81–99)
MONOCYTES # BLD AUTO: 1 K/UL — HIGH (ref 0.1–0.6)
MONOCYTES NFR BLD AUTO: 7.6 % — SIGNIFICANT CHANGE UP (ref 1.7–9.3)
NEUTROPHILS # BLD AUTO: 11.59 K/UL — HIGH (ref 1.4–6.5)
NEUTROPHILS NFR BLD AUTO: 87.7 % — HIGH (ref 42.2–75.2)
NRBC # BLD: 0 /100 WBCS — SIGNIFICANT CHANGE UP (ref 0–0)
PLATELET # BLD AUTO: 170 K/UL — SIGNIFICANT CHANGE UP (ref 130–400)
POTASSIUM SERPL-MCNC: 4.2 MMOL/L — SIGNIFICANT CHANGE UP (ref 3.5–5)
POTASSIUM SERPL-SCNC: 4.2 MMOL/L — SIGNIFICANT CHANGE UP (ref 3.5–5)
PROT SERPL-MCNC: 5.9 G/DL — LOW (ref 6–8)
PROT SERPL-MCNC: 6 G/DL — SIGNIFICANT CHANGE UP (ref 6–8)
RBC # BLD: 4.42 M/UL — SIGNIFICANT CHANGE UP (ref 4.2–5.4)
RBC # FLD: 13.7 % — SIGNIFICANT CHANGE UP (ref 11.5–14.5)
SODIUM SERPL-SCNC: 139 MMOL/L — SIGNIFICANT CHANGE UP (ref 135–146)
WBC # BLD: 13.21 K/UL — HIGH (ref 4.8–10.8)
WBC # FLD AUTO: 13.21 K/UL — HIGH (ref 4.8–10.8)

## 2022-07-20 PROCEDURE — 99233 SBSQ HOSP IP/OBS HIGH 50: CPT

## 2022-07-20 PROCEDURE — 93970 EXTREMITY STUDY: CPT | Mod: 26

## 2022-07-20 RX ORDER — HYDRALAZINE HCL 50 MG
25 TABLET ORAL EVERY 8 HOURS
Refills: 0 | Status: DISCONTINUED | OUTPATIENT
Start: 2022-07-20 | End: 2022-07-21

## 2022-07-20 RX ORDER — CEFTRIAXONE 500 MG/1
1000 INJECTION, POWDER, FOR SOLUTION INTRAMUSCULAR; INTRAVENOUS EVERY 24 HOURS
Refills: 0 | Status: DISCONTINUED | OUTPATIENT
Start: 2022-07-20 | End: 2022-07-20

## 2022-07-20 RX ADMIN — LISINOPRIL 5 MILLIGRAM(S): 2.5 TABLET ORAL at 05:52

## 2022-07-20 RX ADMIN — MEMANTINE HYDROCHLORIDE 5 MILLIGRAM(S): 10 TABLET ORAL at 11:57

## 2022-07-20 RX ADMIN — LAMOTRIGINE 100 MILLIGRAM(S): 25 TABLET, ORALLY DISINTEGRATING ORAL at 21:44

## 2022-07-20 RX ADMIN — Medication 6 MILLIGRAM(S): at 05:53

## 2022-07-20 RX ADMIN — Medication 50 MILLIGRAM(S): at 05:52

## 2022-07-20 RX ADMIN — Medication 50 MILLIGRAM(S): at 17:28

## 2022-07-20 RX ADMIN — ENOXAPARIN SODIUM 40 MILLIGRAM(S): 100 INJECTION SUBCUTANEOUS at 17:29

## 2022-07-20 RX ADMIN — CHLORHEXIDINE GLUCONATE 1 APPLICATION(S): 213 SOLUTION TOPICAL at 05:53

## 2022-07-20 RX ADMIN — MEMANTINE HYDROCHLORIDE 10 MILLIGRAM(S): 10 TABLET ORAL at 21:45

## 2022-07-20 RX ADMIN — LISINOPRIL 10 MILLIGRAM(S): 2.5 TABLET ORAL at 21:44

## 2022-07-20 RX ADMIN — ENOXAPARIN SODIUM 40 MILLIGRAM(S): 100 INJECTION SUBCUTANEOUS at 05:52

## 2022-07-20 RX ADMIN — REMDESIVIR 500 MILLIGRAM(S): 5 INJECTION INTRAVENOUS at 17:28

## 2022-07-20 RX ADMIN — Medication 20 MILLIGRAM(S): at 05:52

## 2022-07-20 NOTE — CHART NOTE - NSCHARTNOTEFT_GEN_A_CORE
3 Day Calorie Count observed in room. Initiated from 7/20 until 7/22. RN aware. RD will collect and post results on 7/23.    RD remains available: Stefania Goldman [phone]

## 2022-07-20 NOTE — PROGRESS NOTE ADULT - SUBJECTIVE AND OBJECTIVE BOX
SCAR DING 89y Female  MRN#: [id number]     SUBJECTIVE  Patient is a 89y old Female who presents with a chief complaint of COVID (2022 16:58)  Currently admitted to medicine with the primary diagnosis of COVID-19    Today is hospital day 2d, and this morning she appeared well on exam and had no complaints. Patient was alert and oriented x2, to self and location. No acute events overnight.      OBJECTIVE  PAST MEDICAL & SURGICAL HISTORY  Hypertension, unspecified type  History of deep vein thrombosis  Thoracic aortic dissection  Dementia  Dyslipidemia  H/O partial thyroidectomy  H/O rotator cuff tear  Status post hip surgery      ALLERGIES:  NSAIDs (Hives; Urticaria)    MEDICATIONS:  STANDING MEDICATIONS  chlorhexidine 4% Liquid 1 Application(s) Topical <User Schedule>  enoxaparin Injectable 40 milliGRAM(s) SubCutaneous every 12 hours  furosemide    Tablet 20 milliGRAM(s) Oral daily  labetalol 50 milliGRAM(s) Oral two times a day  lamoTRIgine 100 milliGRAM(s) Oral at bedtime  lisinopril 5 milliGRAM(s) Oral daily  lisinopril 10 milliGRAM(s) Oral at bedtime  memantine 10 milliGRAM(s) Oral at bedtime  memantine 5 milliGRAM(s) Oral daily    PRN MEDICATIONS  melatonin 3 milliGRAM(s) Oral at bedtime PRN      VITAL SIGNS: Last 24 Hours  Vital Signs Last 24 Hrs  T(C): 36.2 (2022 04:52), Max: 36.7 (2022 20:46)  T(F): 97.2 (2022 04:52), Max: 98.1 (2022 20:46)  HR: 51 (2022 08:00) (51 - 80)  BP: 159/72 (2022 08:00) (101/70 - 171/74)  BP(mean): --  RR: 18 (2022 08:00) (18 - 20)  SpO2: 96% (2022 08:00) (95% - 96%)    Parameters below as of 2022 08:00  Patient On (Oxygen Delivery Method): room air    LABS:                        13.1   13.21 )-----------( 170      ( 2022 07:41 )             40.1     07-20    139  |  104  |  41<H>  ----------------------------<  127<H>  4.2   |  22  |  1.3    Ca    9.5      2022 07:41  Mg     1.8         TPro  6.0  /  Alb  3.8  /  TBili  0.3  /  DBili  0.2  /  AST  15  /  ALT  13  /  AlkPhos  66      Urinalysis Basic - ( 2022 18:20 )    Color: Light Yellow / Appearance: Clear / S.017 / pH: x  Gluc: x / Ketone: Trace  / Bili: Negative / Urobili: <2 mg/dL   Blood: x / Protein: Trace / Nitrite: Negative   Leuk Esterase: Negative / RBC: x / WBC x   Sq Epi: x / Non Sq Epi: x / Bacteria: x        Troponin T, Serum: 0.01 ng/mL (22 @ 14:09)  Lactate, Blood: 0.9 mmol/L (22 @ 14:04)      CARDIAC MARKERS ( 2022 14:09 )  x     / 0.01 ng/mL / x     / x     / x          RADIOLOGY:   Xray Chest 1 View-PORTABLE IMMEDIATE (22 @ 13:15) >  1.  Bilateral perihilar opacities.  2.  Small right pleural effusion      PHYSICAL EXAM:  GENERAL: NAD, well-developed, AAOx2 to self and place  HEENT:  Atraumatic, Normocephalic. EOMI  PULMONARY: Ronchi noted on exam in apexes and bases  CARDIOVASCULAR: Regular rate and rhythm; No murmurs, rubs, or gallops  GASTROINTESTINAL: Soft, Nontender, Nondistended; Bowel sounds present  MUSCULOSKELETAL:  2+ Peripheral Pulses, No clubbing, cyanosis, or edema  NEUROLOGY: non-focal, normal sensory and movement  SKIN: No rashes or lesions      ADMISSION SUMMARY  Patient is a 89y old Female who presents with a chief complaint of COVID (2022 16:58)  Currently admitted to medicine with the primary diagnosis of COVID-19    ASSESSMENT & PLAN  88 yo F with PMH of dementia, HTN, HLD, type B aortic dissection 2017, AAA 4.4 cm, CAD, CKD, DVTs previously on Eliquis presenting for 2 days of cough productive of yellow sputum, fever 100.6 yesterday, and SOB, admitted for acute hypoxic respiratory failure 2/2 COVID. Patient was improved on examination today.    #Acute hypoxic respiratory failure 2/2 COVID-19 PNA  - COVID positive on  per family  - Initially on 2 L NC > now on RA saturating 90-92%  - D-dimer 1000. LE duplex - no signs of thrombosis  - Lovenox 0.5 mg/kg BID, Cr clearance of 29  - Patient tolerating off of oxygen, saturation at 95%  - Patient on 6 mg Dexamethasone  - Patient on day 2 of 5 of RDV treatment    #CAD  #Hx of grade I diastolic heart failure; r/o worsening CHF  - TTE  grade I DD, normal systolic function  - Not volume overloaded   - BNP 3585    #CKD  - At baseline, monitor    #Hx of DVT  - Years ago. Daughter said she had multiple DVTs and was seeing a hematologist who stopped AC    #Hx of type B Aortic dissection, AAA   #HTN  - Cont Losartan    #HLD  - Not on statin currently    #Dementia, depression  - Cont memantine, lamotrigine    #Code  - Patient's daughter confirmed DNR/DNI over phone, ABDON filled out     DVT Prophylaxis: Lovenox 0.5 mg/kg BID  GI Prophylaxis: Not indicated  Diet: DASH  Activity: PT consult, out of bed

## 2022-07-21 ENCOUNTER — TRANSCRIPTION ENCOUNTER (OUTPATIENT)
Age: 87
End: 2022-07-21

## 2022-07-21 LAB
-  AMIKACIN: SIGNIFICANT CHANGE UP
-  AMOXICILLIN/CLAVULANIC ACID: SIGNIFICANT CHANGE UP
-  AMPICILLIN/SULBACTAM: SIGNIFICANT CHANGE UP
-  AMPICILLIN: SIGNIFICANT CHANGE UP
-  AZTREONAM: SIGNIFICANT CHANGE UP
-  CEFAZOLIN: SIGNIFICANT CHANGE UP
-  CEFEPIME: SIGNIFICANT CHANGE UP
-  CEFOXITIN: SIGNIFICANT CHANGE UP
-  CEFTRIAXONE: SIGNIFICANT CHANGE UP
-  CIPROFLOXACIN: SIGNIFICANT CHANGE UP
-  ERTAPENEM: SIGNIFICANT CHANGE UP
-  GENTAMICIN: SIGNIFICANT CHANGE UP
-  IMIPENEM: SIGNIFICANT CHANGE UP
-  LEVOFLOXACIN: SIGNIFICANT CHANGE UP
-  MEROPENEM: SIGNIFICANT CHANGE UP
-  NITROFURANTOIN: SIGNIFICANT CHANGE UP
-  PIPERACILLIN/TAZOBACTAM: SIGNIFICANT CHANGE UP
-  TIGECYCLINE: SIGNIFICANT CHANGE UP
-  TOBRAMYCIN: SIGNIFICANT CHANGE UP
-  TRIMETHOPRIM/SULFAMETHOXAZOLE: SIGNIFICANT CHANGE UP
ALBUMIN SERPL ELPH-MCNC: 3.8 G/DL — SIGNIFICANT CHANGE UP (ref 3.5–5.2)
ALBUMIN SERPL ELPH-MCNC: 3.8 G/DL — SIGNIFICANT CHANGE UP (ref 3.5–5.2)
ALP SERPL-CCNC: 70 U/L — SIGNIFICANT CHANGE UP (ref 30–115)
ALP SERPL-CCNC: 71 U/L — SIGNIFICANT CHANGE UP (ref 30–115)
ALT FLD-CCNC: 12 U/L — SIGNIFICANT CHANGE UP (ref 0–41)
ALT FLD-CCNC: 13 U/L — SIGNIFICANT CHANGE UP (ref 0–41)
ANION GAP SERPL CALC-SCNC: 15 MMOL/L — HIGH (ref 7–14)
AST SERPL-CCNC: 15 U/L — SIGNIFICANT CHANGE UP (ref 0–41)
AST SERPL-CCNC: 15 U/L — SIGNIFICANT CHANGE UP (ref 0–41)
BASOPHILS # BLD AUTO: 0.01 K/UL — SIGNIFICANT CHANGE UP (ref 0–0.2)
BASOPHILS NFR BLD AUTO: 0.1 % — SIGNIFICANT CHANGE UP (ref 0–1)
BILIRUB DIRECT SERPL-MCNC: <0.2 MG/DL — SIGNIFICANT CHANGE UP (ref 0–0.3)
BILIRUB INDIRECT FLD-MCNC: >0.1 MG/DL — LOW (ref 0.2–1.2)
BILIRUB SERPL-MCNC: 0.3 MG/DL — SIGNIFICANT CHANGE UP (ref 0.2–1.2)
BILIRUB SERPL-MCNC: 0.3 MG/DL — SIGNIFICANT CHANGE UP (ref 0.2–1.2)
BUN SERPL-MCNC: 50 MG/DL — HIGH (ref 10–20)
CALCIUM SERPL-MCNC: 9.4 MG/DL — SIGNIFICANT CHANGE UP (ref 8.5–10.1)
CHLORIDE SERPL-SCNC: 102 MMOL/L — SIGNIFICANT CHANGE UP (ref 98–110)
CO2 SERPL-SCNC: 22 MMOL/L — SIGNIFICANT CHANGE UP (ref 17–32)
CREAT SERPL-MCNC: 1.4 MG/DL — SIGNIFICANT CHANGE UP (ref 0.7–1.5)
CREAT SERPL-MCNC: 1.4 MG/DL — SIGNIFICANT CHANGE UP (ref 0.7–1.5)
CULTURE RESULTS: SIGNIFICANT CHANGE UP
EGFR: 36 ML/MIN/1.73M2 — LOW
EGFR: 36 ML/MIN/1.73M2 — LOW
EOSINOPHIL # BLD AUTO: 0 K/UL — SIGNIFICANT CHANGE UP (ref 0–0.7)
EOSINOPHIL NFR BLD AUTO: 0 % — SIGNIFICANT CHANGE UP (ref 0–8)
GLUCOSE SERPL-MCNC: 136 MG/DL — HIGH (ref 70–99)
HCT VFR BLD CALC: 42.5 % — SIGNIFICANT CHANGE UP (ref 37–47)
HGB BLD-MCNC: 14.2 G/DL — SIGNIFICANT CHANGE UP (ref 12–16)
IMM GRANULOCYTES NFR BLD AUTO: 0.8 % — HIGH (ref 0.1–0.3)
LYMPHOCYTES # BLD AUTO: 0.61 K/UL — LOW (ref 1.2–3.4)
LYMPHOCYTES # BLD AUTO: 5.9 % — LOW (ref 20.5–51.1)
MAGNESIUM SERPL-MCNC: 1.8 MG/DL — SIGNIFICANT CHANGE UP (ref 1.8–2.4)
MCHC RBC-ENTMCNC: 30.2 PG — SIGNIFICANT CHANGE UP (ref 27–31)
MCHC RBC-ENTMCNC: 33.4 G/DL — SIGNIFICANT CHANGE UP (ref 32–37)
MCV RBC AUTO: 90.4 FL — SIGNIFICANT CHANGE UP (ref 81–99)
METHOD TYPE: SIGNIFICANT CHANGE UP
MONOCYTES # BLD AUTO: 0.65 K/UL — HIGH (ref 0.1–0.6)
MONOCYTES NFR BLD AUTO: 6.3 % — SIGNIFICANT CHANGE UP (ref 1.7–9.3)
NEUTROPHILS # BLD AUTO: 8.91 K/UL — HIGH (ref 1.4–6.5)
NEUTROPHILS NFR BLD AUTO: 86.9 % — HIGH (ref 42.2–75.2)
NRBC # BLD: 0 /100 WBCS — SIGNIFICANT CHANGE UP (ref 0–0)
ORGANISM # SPEC MICROSCOPIC CNT: SIGNIFICANT CHANGE UP
ORGANISM # SPEC MICROSCOPIC CNT: SIGNIFICANT CHANGE UP
PLATELET # BLD AUTO: 181 K/UL — SIGNIFICANT CHANGE UP (ref 130–400)
POTASSIUM SERPL-MCNC: 4.4 MMOL/L — SIGNIFICANT CHANGE UP (ref 3.5–5)
POTASSIUM SERPL-SCNC: 4.4 MMOL/L — SIGNIFICANT CHANGE UP (ref 3.5–5)
PROT SERPL-MCNC: 6.3 G/DL — SIGNIFICANT CHANGE UP (ref 6–8)
PROT SERPL-MCNC: 6.3 G/DL — SIGNIFICANT CHANGE UP (ref 6–8)
RBC # BLD: 4.7 M/UL — SIGNIFICANT CHANGE UP (ref 4.2–5.4)
RBC # FLD: 13.8 % — SIGNIFICANT CHANGE UP (ref 11.5–14.5)
SODIUM SERPL-SCNC: 139 MMOL/L — SIGNIFICANT CHANGE UP (ref 135–146)
SPECIMEN SOURCE: SIGNIFICANT CHANGE UP
WBC # BLD: 10.26 K/UL — SIGNIFICANT CHANGE UP (ref 4.8–10.8)
WBC # FLD AUTO: 10.26 K/UL — SIGNIFICANT CHANGE UP (ref 4.8–10.8)

## 2022-07-21 PROCEDURE — 99233 SBSQ HOSP IP/OBS HIGH 50: CPT

## 2022-07-21 RX ORDER — HYDRALAZINE HCL 50 MG
20 TABLET ORAL ONCE
Refills: 0 | Status: COMPLETED | OUTPATIENT
Start: 2022-07-21 | End: 2022-07-21

## 2022-07-21 RX ORDER — HYDRALAZINE HCL 50 MG
50 TABLET ORAL EVERY 8 HOURS
Refills: 0 | Status: DISCONTINUED | OUTPATIENT
Start: 2022-07-21 | End: 2022-07-21

## 2022-07-21 RX ORDER — LISINOPRIL 2.5 MG/1
5 TABLET ORAL DAILY
Refills: 0 | Status: DISCONTINUED | OUTPATIENT
Start: 2022-07-22 | End: 2022-07-22

## 2022-07-21 RX ORDER — LISINOPRIL 2.5 MG/1
10 TABLET ORAL AT BEDTIME
Refills: 0 | Status: DISCONTINUED | OUTPATIENT
Start: 2022-07-21 | End: 2022-07-22

## 2022-07-21 RX ORDER — LISINOPRIL 2.5 MG/1
20 TABLET ORAL AT BEDTIME
Refills: 0 | Status: DISCONTINUED | OUTPATIENT
Start: 2022-07-21 | End: 2022-07-21

## 2022-07-21 RX ADMIN — ENOXAPARIN SODIUM 40 MILLIGRAM(S): 100 INJECTION SUBCUTANEOUS at 18:00

## 2022-07-21 RX ADMIN — Medication 50 MILLIGRAM(S): at 18:00

## 2022-07-21 RX ADMIN — LAMOTRIGINE 100 MILLIGRAM(S): 25 TABLET, ORALLY DISINTEGRATING ORAL at 21:17

## 2022-07-21 RX ADMIN — CHLORHEXIDINE GLUCONATE 1 APPLICATION(S): 213 SOLUTION TOPICAL at 06:24

## 2022-07-21 RX ADMIN — ENOXAPARIN SODIUM 40 MILLIGRAM(S): 100 INJECTION SUBCUTANEOUS at 05:10

## 2022-07-21 RX ADMIN — LISINOPRIL 5 MILLIGRAM(S): 2.5 TABLET ORAL at 05:10

## 2022-07-21 RX ADMIN — Medication 6 MILLIGRAM(S): at 05:11

## 2022-07-21 RX ADMIN — Medication 20 MILLIGRAM(S): at 05:10

## 2022-07-21 RX ADMIN — MEMANTINE HYDROCHLORIDE 10 MILLIGRAM(S): 10 TABLET ORAL at 21:17

## 2022-07-21 RX ADMIN — Medication 20 MILLIGRAM(S): at 11:43

## 2022-07-21 RX ADMIN — LISINOPRIL 10 MILLIGRAM(S): 2.5 TABLET ORAL at 21:17

## 2022-07-21 RX ADMIN — MEMANTINE HYDROCHLORIDE 5 MILLIGRAM(S): 10 TABLET ORAL at 11:45

## 2022-07-21 RX ADMIN — REMDESIVIR 500 MILLIGRAM(S): 5 INJECTION INTRAVENOUS at 18:01

## 2022-07-21 NOTE — PROGRESS NOTE ADULT - SUBJECTIVE AND OBJECTIVE BOX
SCAR DING  89y, Female    All available historical data reviewed    OVERNIGHT EVENTS:  no fevers  feels well and has no new complaints  93% RA  No  issues    ROS:  General: Denies rigors, nightsweats  HEENT: Denies headache, rhinorrhea, sore throat, eye pain  CV: Denies CP, palpitations  PULM: Denies wheezing, hemoptysis  GI: Denies hematemesis, hematochezia, melena  : Denies discharge, hematuria  MSK: Denies arthralgias, myalgias  SKIN: Denies rash, lesions  NEURO: Denies paresthesias, weakness  PSYCH: Denies depression, anxiety    VITALS:  T(F): 96.8, Max: 97.9 (07-20-22 @ 20:40)  HR: 73  BP: 151/79  RR: 18Vital Signs Last 24 Hrs  T(C): 36 (21 Jul 2022 12:52), Max: 36.6 (20 Jul 2022 20:40)  T(F): 96.8 (21 Jul 2022 12:52), Max: 97.9 (20 Jul 2022 20:40)  HR: 73 (21 Jul 2022 12:52) (52 - 73)  BP: 151/79 (21 Jul 2022 12:52) (151/73 - 195/84)  BP(mean): 107 (21 Jul 2022 12:52) (95 - 120)  RR: 18 (21 Jul 2022 12:52) (16 - 18)  SpO2: 93% (21 Jul 2022 12:52) (93% - 95%)    Parameters below as of 21 Jul 2022 12:52  Patient On (Oxygen Delivery Method): room air        TESTS & MEASUREMENTS:                        14.2   10.26 )-----------( 181      ( 21 Jul 2022 09:11 )             42.5     07-21    139  |  102  |  50<H>  ----------------------------<  136<H>  4.4   |  22  |  1.4    Ca    9.4      21 Jul 2022 09:11  Mg     1.8     07-21    TPro  6.3  /  Alb  3.8  /  TBili  0.3  /  DBili  <0.2  /  AST  15  /  ALT  12  /  AlkPhos  70  07-21    LIVER FUNCTIONS - ( 21 Jul 2022 09:11 )  Alb: 3.8 g/dL / Pro: 6.3 g/dL / ALK PHOS: 70 U/L / ALT: 12 U/L / AST: 15 U/L / GGT: x             Culture - Urine (collected 07-18-22 @ 18:20)  Source: Clean Catch Clean Catch (Midstream)  Final Report (07-21-22 @ 11:03):    >100,000 CFU/ml Escherichia coli  Organism: Escherichia coli (07-21-22 @ 11:03)  Organism: Escherichia coli (07-21-22 @ 11:03)      -  Amikacin: S <=16      -  Amoxicillin/Clavulanic Acid: S <=8/4      -  Ampicillin: S <=8 These ampicillin results predict results for amoxicillin      -  Ampicillin/Sulbactam: S <=4/2 Enterobacter, Klebsiella aerogenes, Citrobacter, and Serratia may develop resistance during prolonged therapy (3-4 days)      -  Aztreonam: S <=4      -  Cefazolin: S <=2 (MIC_CL_COM_ENTERIC_CEFAZU) For uncomplicated UTI with K. pneumoniae, E. coli, or P. mirablis: MIREYA <=16 is sensitive and MIREYA >=32 is resistant. This also predicts results for oral agents cefaclor, cefdinir, cefpodoxime, cefprozil, cefuroxime axetil, cephalexin and locarbef for uncomplicated UTI. Note that some isolates may be susceptible to these agents while testing resistant to cefazolin.      -  Cefepime: S <=2      -  Cefoxitin: S <=8      -  Ceftriaxone: S <=1 Enterobacter, Klebsiella aerogenes, Citrobacter, and Serratia may develop resistance during prolonged therapy      -  Ciprofloxacin: S <=0.25      -  Ertapenem: S <=0.5      -  Gentamicin: S <=2      -  Imipenem: S <=1      -  Levofloxacin: S <=0.5      -  Meropenem: S <=1      -  Nitrofurantoin: S <=32 Should not be used to treat pyelonephritis      -  Piperacillin/Tazobactam: S <=8      -  Tigecycline: S <=2      -  Tobramycin: S <=2      -  Trimethoprim/Sulfamethoxazole: S <=0.5/9.5      Method Type: MIREYA    Culture - Blood (collected 07-18-22 @ 14:12)  Source: .Blood Blood-Peripheral  Preliminary Report (07-19-22 @ 23:01):    No growth to date.    Culture - Blood (collected 07-18-22 @ 14:12)  Source: .Blood Blood-Peripheral  Preliminary Report (07-19-22 @ 23:01):    No growth to date.            RADIOLOGY & ADDITIONAL TESTS:  Personal review of radiological diagnostics performed  Echo and EKG results noted when applicable.     MEDICATIONS:  chlorhexidine 4% Liquid 1 Application(s) Topical <User Schedule>  dexAMETHasone  Injectable 6 milliGRAM(s) IV Push daily  enoxaparin Injectable 40 milliGRAM(s) SubCutaneous every 12 hours  furosemide    Tablet 20 milliGRAM(s) Oral daily  labetalol 50 milliGRAM(s) Oral two times a day  lamoTRIgine 100 milliGRAM(s) Oral at bedtime  lisinopril 10 milliGRAM(s) Oral at bedtime  melatonin 3 milliGRAM(s) Oral at bedtime PRN  memantine 10 milliGRAM(s) Oral at bedtime  memantine 5 milliGRAM(s) Oral daily  remdesivir  IVPB   IV Intermittent   remdesivir  IVPB 100 milliGRAM(s) IV Intermittent every 24 hours      ANTIBIOTICS:  remdesivir  IVPB   IV Intermittent   remdesivir  IVPB 100 milliGRAM(s) IV Intermittent every 24 hours

## 2022-07-21 NOTE — DISCHARGE NOTE NURSING/CASE MANAGEMENT/SOCIAL WORK - NSDCPEFALRISK_GEN_ALL_CORE
For information on Fall & Injury Prevention, visit: [email or web address] OR  [email or web address] OR  https://www.cdc.gov/steadi/patient.html

## 2022-07-21 NOTE — PROGRESS NOTE ADULT - ASSESSMENT
· Assessment	  Pt received dexamethasone 6 mg, LR bolus 500 Counseled patient about diagnostic testing and treatment plan. All questions answered.aortic dissection 2017, AAA 4.4 cm, CAD, CKD, multiple LE DVTs no longer on AC for years per her hematologist presenting for 2 days of productive cough usually clear occasionally yellow, fever 100.3 yesterday, and SOB. Patient became hypoxic to 83 at home today, prompting family to bring her in. She tested positive for COVID yesterday after was exposed to her home health aide 48-72 hours before who then tested positive. Patient is double vaccinated and boosted against COVID, nonsmoker. Denies chest pain, shortness of breath, dizziness, nausea, vomiting, diarrhea, hemoptysis, palpitations.    ED VS: T(F): , Max: 100.4 HR:  59 BP:  142/62 RR: 18 SpO2:  92% 2 L NC > now on RA saturating 90-92%    Labs: COVID +, BNP 3600, d-dimer 1025, anion gap 16, lactate 0.9  CXR with b/l peripheral opacities and small left pleural effusion  Pt received dexamethasone 6 mg, LR bolus 500 cc    IMPRESSION;  COVID 19 with severe illness. SpO2 < 94% on RA and need for supplemental O2.  Pt is in the early viral replicative phase based on the timeline/onset of symptoms.  CXR no significant GGO  Inflammatory markers ar elevated but clinically and based on CXR no cytokine storm  Presently on RA    RECOMMENDATIONS;  Target SpO2 92 % to 96 %  Day 1 : Single  mg IV loading dose  Day 2-5 : single  mg IV once daily maintenance dose  Daily CBC,CMP.   Dexamethasone 6 mg iv q24h for 10 days.  Monitor for side effects: hyperglycemia, neurological ( agitation/confusion), adrenal suppression, bacterial and fungal infections  Could finish course of steroids with po prednisone 40 mg q24 for a total of 10 days once she is done with RDV  Please do not hesitate to recall ID if any questions arise either through [email or web address] or through [employer]

## 2022-07-21 NOTE — DISCHARGE NOTE PROVIDER - CARE PROVIDER_API CALL
David Corcoran  INTERNAL MEDICINE  [address], [address]  [place] [address]  Phone: [phone]  Fax: [phone]  Established Patient  Follow Up Time: 1 week

## 2022-07-21 NOTE — DISCHARGE NOTE NURSING/CASE MANAGEMENT/SOCIAL WORK - PATIENT PORTAL LINK FT
You can access the FollowMyHealth Patient Portal offered by [facility] by registering at the following website: [email or web address]. By joining [facility]’s FollowMyHealth portal, you will also be able to view your health information using other applications (apps) compatible with our system.

## 2022-07-21 NOTE — DISCHARGE NOTE PROVIDER - HOSPITAL COURSE
88 yo F with PMH of dementia, HTN, HLD, type B aortic dissection 2017, AAA 4.4 cm, CAD, CKD, DVTs previously on Eliquis presenting for 2 days of cough productive of yellow sputum, fever 100.6 yesterday, and SOB, admitted for acute hypoxic respiratory failure 2/2 COVID. Patient started on dexamethasone and RDV. Patient was improved on examination and can be taken care of at home. 88 yo F with PMH of dementia, HTN, HLD, type B aortic dissection 2017, AAA 4.4 cm, CAD, CKD, DVTs previously on Eliquis presented for 2 days of cough productive of yellow sputum, fever 100.6 yesterday, and SOB, admitted for acute hypoxic respiratory failure 2/2 COVID.   During hospital stay pt was seen by Infectious disease and started on dexamethasone and RDV.   Patient was improved on examination and can be taken care of at home.   89 yo F with PMH of dementia, HTN, HLD, type B aortic dissection 2017, AAA 4.4 cm, CAD, CKD, DVTs previously on Eliquis presented for 2 days of cough productive of yellow sputum, fever 100.6 yesterday, and SOB, admitted for acute hypoxic respiratory failure 2/2 COVID.   During hospital stay pt was seen by Infectious disease and started on dexamethasone and RDV.   Patient was improved on examination and can be taken care of at home.  -Daughter Camelia muñoz with three day dose of RDV, and wants patient to be discharged early this morning   -spoke to granddaughter Gladis; updates given     ***Spent over 35 mins d/c planning; direct patient care and chart review   -d/c papers edited by me

## 2022-07-21 NOTE — DISCHARGE NOTE PROVIDER - NSDCCPCAREPLAN_GEN_ALL_CORE_FT
PRINCIPAL DISCHARGE DIAGNOSIS  Diagnosis: COVID-19  Assessment and Plan of Treatment: Patient presented for 2 days of cough productive of yellow sputum, fever 100.6 yesterday, and SOB, admitted for acute hypoxic respiratory failure 2/2 COVID. Patient started on dexamethasone and RDV, but can discontinued since patient is well on exam and can be taken care of at home. Follow up with PCP in 1 week.      SECONDARY DISCHARGE DIAGNOSES  Diagnosis: Acute respiratory failure with hypoxia  Assessment and Plan of Treatment:     Diagnosis: Elevated brain natriuretic peptide (BNP) level  Assessment and Plan of Treatment:      PRINCIPAL DISCHARGE DIAGNOSIS  Diagnosis: COVID-19  Assessment and Plan of Treatment: Patient presented for 2 days of cough productive of yellow sputum, fever 100.6 yesterday, and SOB, admitted for acute hypoxic respiratory failure 2/2 COVID. Patient started on dexamethasone and RDV, but can discontinued since patient is well on exam and can be taken care of at home. Follow up with PCP in 1 week.

## 2022-07-21 NOTE — DISCHARGE NOTE PROVIDER - NSDCMRMEDTOKEN_GEN_ALL_CORE_FT
Deplin 15 mg oral tablet: 1 tab(s) orally once a day  furosemide 20 mg oral tablet: 1 tab(s) orally once a week  labetalol 100 mg oral tablet: 0.5 tab(s) orally 2 times a day  lamoTRIgine 100 mg oral tablet, extended release: 1 tab(s) orally once a day  lisinopril 5 mg oral tablet: 1 tab(s) orally once a day  Namenda 10 mg oral tablet: 1 tab(s) orally once a day (in the evening)  Namenda 5 mg oral tablet: 1 tab(s) orally once a day (in the morning)   Deplin 15 mg oral tablet: 1 tab(s) orally once a day  furosemide 20 mg oral tablet: 1 tab(s) orally once a week  labetalol 100 mg oral tablet: 0.5 tab(s) orally 2 times a day  lamoTRIgine 100 mg oral tablet, extended release: 1 tab(s) orally once a day  lisinopril 5 mg oral tablet: 1 tab(s) orally once a day  Namenda 10 mg oral tablet: 1 tab(s) orally once a day (in the evening)  Namenda 5 mg oral tablet: 1 tab(s) orally once a day (in the morning)  predniSONE 20 mg oral tablet: 2 tab(s) orally once a day    Deplin 15 mg oral tablet: 1 tab(s) orally once a day  furosemide 20 mg oral tablet: 1 tab(s) orally once a week  labetalol 100 mg oral tablet: 0.5 tab(s) orally 2 times a day  lamoTRIgine 100 mg oral tablet, extended release: 1 tab(s) orally once a day  lisinopril 5 mg oral tablet: 1 tab(s) orally once a day  Namenda 10 mg oral tablet: 1 tab(s) orally once a day (in the evening)  Namenda 5 mg oral tablet: 1 tab(s) orally once a day (in the morning)  predniSONE 20 mg oral tablet: 2 tab(s) orally once a day   Xarelto 20 mg oral tablet: 1 tab(s) orally once a day

## 2022-07-21 NOTE — PROGRESS NOTE ADULT - SUBJECTIVE AND OBJECTIVE BOX
SCAR DING 89y Female  MRN#: [id number]   Code: DNR/DNI    SUBJECTIVE  Patient is a 89y old Female who presents with a chief complaint of COVID (2022 16:58)  Currently admitted to medicine with the primary diagnosis of COVID-19    Today is hospital day 3, and this morning she appeared well on exam and had no complaints. Patient was alert and oriented x2, to self and location. Patient had elevated blood pressures and bradycardia overnight.    OBJECTIVE  PAST MEDICAL & SURGICAL HISTORY  Hypertension, unspecified type  History of deep vein thrombosis  Thoracic aortic dissection  Dementia  Dyslipidemia  H/O partial thyroidectomy  H/O rotator cuff tear  Status post hip surgery      ALLERGIES:  NSAIDs (Hives; Urticaria)    MEDICATIONS:  STANDING MEDICATIONS  chlorhexidine 4% Liquid 1 Application(s) Topical <User Schedule>  enoxaparin Injectable 40 milliGRAM(s) SubCutaneous every 12 hours  furosemide    Tablet 20 milliGRAM(s) Oral daily  labetalol 50 milliGRAM(s) Oral two times a day  lamoTRIgine 100 milliGRAM(s) Oral at bedtime  lisinopril 5 milliGRAM(s) Oral daily  lisinopril 10 milliGRAM(s) Oral at bedtime  memantine 10 milliGRAM(s) Oral at bedtime  memantine 5 milliGRAM(s) Oral daily    PRN MEDICATIONS  melatonin 3 milliGRAM(s) Oral at bedtime PRN      VITAL SIGNS: Last 24 Hours  Vital Signs Last 24 Hrs  T(C): 36 (2022 12:52), Max: 36.6 (2022 20:40)  T(F): 96.8 (2022 12:52), Max: 97.9 (2022 20:40)  HR: 73 (2022 12:52) (52 - 73)  BP: 151/79 (2022 12:52) (151/73 - 195/84)  BP(mean): 107 (2022 12:52) (95 - 120)  RR: 18 (2022 12:52) (18 - 18)  SpO2: 93% (2022 12:52) (93% - 95%)    Parameters below as of 2022 12:52  Patient On (Oxygen Delivery Method): room air        LABS:      139  |  102  |  50<H>  ----------------------------<  136<H>  4.4   |  22  |  1.4    Ca    9.4      2022 09:11  Mg     1.8         TPro  6.3  /  Alb  3.8  /  TBili  0.3  /  DBili  <0.2  /  AST  15  /  ALT  12  /  AlkPhos  70                            14.2   10.26 )-----------( 181      ( 2022 09:11 )             42.5       Urinalysis Basic - ( 2022 18:20 )    Color: Light Yellow / Appearance: Clear / S.017 / pH: x  Gluc: x / Ketone: Trace  / Bili: Negative / Urobili: <2 mg/dL   Blood: x / Protein: Trace / Nitrite: Negative   Leuk Esterase: Negative / RBC: x / WBC x   Sq Epi: x / Non Sq Epi: x / Bacteria: x        Troponin T, Serum: 0.01 ng/mL (22 @ 14:09)  Lactate, Blood: 0.9 mmol/L (22 @ 14:04)      CARDIAC MARKERS ( 2022 14:09 )  x     / 0.01 ng/mL / x     / x     / x          RADIOLOGY:   Xray Chest 1 View-PORTABLE IMMEDIATE (22 @ 13:15) >  1.  Bilateral perihilar opacities.  2.  Small right pleural effusion      PHYSICAL EXAM:  GENERAL: NAD, well-developed, AAOx2 to self and place  HEENT:  Atraumatic, Normocephalic. EOMI  PULMONARY: Ronchi noted on exam in apexes and bases  CARDIOVASCULAR: Regular rate and rhythm; No murmurs, rubs, or gallops  GASTROINTESTINAL: Soft, Nontender, Nondistended; Bowel sounds present  MUSCULOSKELETAL:  2+ Peripheral Pulses, No clubbing, cyanosis, or edema  NEUROLOGY: non-focal, normal sensory and movement  SKIN: No rashes or lesions      ADMISSION SUMMARY  Patient is a 89y old Female who presents with a chief complaint of COVID (2022 16:58)  Currently admitted to medicine with the primary diagnosis of COVID-19    ASSESSMENT & PLAN  89 yo F with PMH of dementia, HTN, HLD, type B aortic dissection 2017, AAA 4.4 cm, CAD, CKD, DVTs previously on Eliquis presenting for 2 days of cough productive of yellow sputum, fever 100.6 yesterday, and SOB, admitted for acute hypoxic respiratory failure 2/2 COVID. Patient was improved on examination.    #Acute hypoxic respiratory failure 2/2 COVID-19 PNA  - COVID positive on  per family  - Initially on 2 L NC > now on RA saturating 90-92%  - D-dimer 1000. LE duplex - no signs of thrombosis  - Lovenox 0.5 mg/kg BID, Cr clearance of 29  - Patient tolerating off of oxygen, saturation at 95%  - Patient on 6 mg Dexamethasone  - Patient on day 3 f 5 of RDV treatment    #CAD  #Hx of grade I diastolic heart failure; r/o worsening CHF  - TTE  grade I DD, normal systolic function  - Not volume overloaded   - BNP 3585    #CKD  - At baseline, monitor    #Hx of DVT  - Years ago. Daughter said she had multiple DVTs and was seeing a hematologist who stopped AC    #Hx of type B Aortic dissection, AAA   #HTN  - Cont Losartan    #HLD  - Not on statin currently    #Dementia, depression  - Cont memantine, lamotrigine    #Code  - Patient's daughter confirmed DNR/DNI over phone, MOLST filled out     DVT Prophylaxis: Lovenox 0.5 mg/kg BID  GI Prophylaxis: Not indicated  Diet: DASH  Activity: PT consult, out of bed

## 2022-07-22 VITALS
SYSTOLIC BLOOD PRESSURE: 145 MMHG | OXYGEN SATURATION: 95 % | DIASTOLIC BLOOD PRESSURE: 76 MMHG | TEMPERATURE: 98 F | HEART RATE: 58 BPM | RESPIRATION RATE: 18 BRPM

## 2022-07-22 LAB
ALBUMIN SERPL ELPH-MCNC: 3.7 G/DL — SIGNIFICANT CHANGE UP (ref 3.5–5.2)
ALP SERPL-CCNC: 70 U/L — SIGNIFICANT CHANGE UP (ref 30–115)
ALT FLD-CCNC: 14 U/L — SIGNIFICANT CHANGE UP (ref 0–41)
ANION GAP SERPL CALC-SCNC: 15 MMOL/L — HIGH (ref 7–14)
AST SERPL-CCNC: 16 U/L — SIGNIFICANT CHANGE UP (ref 0–41)
BASOPHILS # BLD AUTO: 0.01 K/UL — SIGNIFICANT CHANGE UP (ref 0–0.2)
BASOPHILS NFR BLD AUTO: 0.1 % — SIGNIFICANT CHANGE UP (ref 0–1)
BILIRUB SERPL-MCNC: 0.4 MG/DL — SIGNIFICANT CHANGE UP (ref 0.2–1.2)
BUN SERPL-MCNC: 57 MG/DL — HIGH (ref 10–20)
CALCIUM SERPL-MCNC: 9.1 MG/DL — SIGNIFICANT CHANGE UP (ref 8.5–10.1)
CHLORIDE SERPL-SCNC: 101 MMOL/L — SIGNIFICANT CHANGE UP (ref 98–110)
CO2 SERPL-SCNC: 21 MMOL/L — SIGNIFICANT CHANGE UP (ref 17–32)
CREAT SERPL-MCNC: 1.6 MG/DL — HIGH (ref 0.7–1.5)
EGFR: 31 ML/MIN/1.73M2 — LOW
EOSINOPHIL # BLD AUTO: 0 K/UL — SIGNIFICANT CHANGE UP (ref 0–0.7)
EOSINOPHIL NFR BLD AUTO: 0 % — SIGNIFICANT CHANGE UP (ref 0–8)
GLUCOSE SERPL-MCNC: 114 MG/DL — HIGH (ref 70–99)
HCT VFR BLD CALC: 44.4 % — SIGNIFICANT CHANGE UP (ref 37–47)
HGB BLD-MCNC: 14.9 G/DL — SIGNIFICANT CHANGE UP (ref 12–16)
IMM GRANULOCYTES NFR BLD AUTO: 0.9 % — HIGH (ref 0.1–0.3)
INR BLD: 1.18 RATIO — SIGNIFICANT CHANGE UP (ref 0.65–1.3)
LYMPHOCYTES # BLD AUTO: 0.64 K/UL — LOW (ref 1.2–3.4)
LYMPHOCYTES # BLD AUTO: 7.1 % — LOW (ref 20.5–51.1)
MAGNESIUM SERPL-MCNC: 1.8 MG/DL — SIGNIFICANT CHANGE UP (ref 1.8–2.4)
MCHC RBC-ENTMCNC: 30.3 PG — SIGNIFICANT CHANGE UP (ref 27–31)
MCHC RBC-ENTMCNC: 33.6 G/DL — SIGNIFICANT CHANGE UP (ref 32–37)
MCV RBC AUTO: 90.2 FL — SIGNIFICANT CHANGE UP (ref 81–99)
MONOCYTES # BLD AUTO: 0.83 K/UL — HIGH (ref 0.1–0.6)
MONOCYTES NFR BLD AUTO: 9.2 % — SIGNIFICANT CHANGE UP (ref 1.7–9.3)
NEUTROPHILS # BLD AUTO: 7.47 K/UL — HIGH (ref 1.4–6.5)
NEUTROPHILS NFR BLD AUTO: 82.7 % — HIGH (ref 42.2–75.2)
NRBC # BLD: 0 /100 WBCS — SIGNIFICANT CHANGE UP (ref 0–0)
PLATELET # BLD AUTO: 201 K/UL — SIGNIFICANT CHANGE UP (ref 130–400)
POTASSIUM SERPL-MCNC: 4.2 MMOL/L — SIGNIFICANT CHANGE UP (ref 3.5–5)
POTASSIUM SERPL-SCNC: 4.2 MMOL/L — SIGNIFICANT CHANGE UP (ref 3.5–5)
PROT SERPL-MCNC: 6.2 G/DL — SIGNIFICANT CHANGE UP (ref 6–8)
PROTHROM AB SERPL-ACNC: 13.6 SEC — HIGH (ref 9.95–12.87)
RBC # BLD: 4.92 M/UL — SIGNIFICANT CHANGE UP (ref 4.2–5.4)
RBC # FLD: 14.1 % — SIGNIFICANT CHANGE UP (ref 11.5–14.5)
SODIUM SERPL-SCNC: 137 MMOL/L — SIGNIFICANT CHANGE UP (ref 135–146)
WBC # BLD: 9.03 K/UL — SIGNIFICANT CHANGE UP (ref 4.8–10.8)
WBC # FLD AUTO: 9.03 K/UL — SIGNIFICANT CHANGE UP (ref 4.8–10.8)

## 2022-07-22 PROCEDURE — 99239 HOSP IP/OBS DSCHRG MGMT >30: CPT

## 2022-07-22 RX ORDER — RIVAROXABAN 15 MG-20MG
1 KIT ORAL
Qty: 30 | Refills: 0
Start: 2022-07-22 | End: 2022-08-20

## 2022-07-22 RX ADMIN — Medication 50 MILLIGRAM(S): at 07:05

## 2022-07-22 RX ADMIN — ENOXAPARIN SODIUM 40 MILLIGRAM(S): 100 INJECTION SUBCUTANEOUS at 05:15

## 2022-07-22 RX ADMIN — Medication 6 MILLIGRAM(S): at 05:15

## 2022-07-22 RX ADMIN — CHLORHEXIDINE GLUCONATE 1 APPLICATION(S): 213 SOLUTION TOPICAL at 05:13

## 2022-07-22 RX ADMIN — Medication 20 MILLIGRAM(S): at 05:15

## 2022-07-22 NOTE — PROGRESS NOTE ADULT - SUBJECTIVE AND OBJECTIVE BOX
SCAR DING 89y Female  MRN#: [id number]   Code: DNR/DNI    SUBJECTIVE  Patient is a 89y old Female who presents with a chief complaint of COVID (2022 16:58)  Currently admitted to medicine with the primary diagnosis of COVID-19    Today is hospital day 4, and this morning she appeared well on exam and had no complaints. Patient was alert and oriented x2, to self and location. Patient had elevated blood pressures and bradycardia overnight.    OBJECTIVE  PAST MEDICAL & SURGICAL HISTORY  Hypertension, unspecified type  History of deep vein thrombosis  Thoracic aortic dissection  Dementia  Dyslipidemia  H/O partial thyroidectomy  H/O rotator cuff tear  Status post hip surgery      ALLERGIES:  NSAIDs (Hives; Urticaria)    MEDICATIONS:  STANDING MEDICATIONS  chlorhexidine 4% Liquid 1 Application(s) Topical <User Schedule>  enoxaparin Injectable 40 milliGRAM(s) SubCutaneous every 12 hours  furosemide    Tablet 20 milliGRAM(s) Oral daily  labetalol 50 milliGRAM(s) Oral two times a day  lamoTRIgine 100 milliGRAM(s) Oral at bedtime  lisinopril 5 milliGRAM(s) Oral daily  lisinopril 10 milliGRAM(s) Oral at bedtime  memantine 10 milliGRAM(s) Oral at bedtime  memantine 5 milliGRAM(s) Oral daily    PRN MEDICATIONS  melatonin 3 milliGRAM(s) Oral at bedtime PRN      VITAL SIGNS: Last 24 Hours  Vital Signs Last 24 Hrs  T(C): 36.4 (2022 05:01), Max: 36.4 (2022 05:01)  T(F): 97.5 (2022 05:01), Max: 97.5 (2022 05:01)  HR: 54 (2022 05:01) (54 - 73)  BP: 186/78 (2022 05:01) (143/72 - 195/84)  BP(mean): 99 (2022 20:00) (95 - 120)  RR: 18 (2022 05:01) (18 - 18)  SpO2: 95% (2022 20:00) (93% - 95%)    Parameters below as of 2022 12:52  Patient On (Oxygen Delivery Method): room air      LABS:        Urinalysis Basic - ( 2022 18:20 )  Color: Light Yellow / Appearance: Clear / S.017 / pH: x  Gluc: x / Ketone: Trace  / Bili: Negative / Urobili: <2 mg/dL   Blood: x / Protein: Trace / Nitrite: Negative   Leuk Esterase: Negative / RBC: x / WBC x   Sq Epi: x / Non Sq Epi: x / Bacteria: x        Troponin T, Serum: 0.01 ng/mL (22 @ 14:09)  Lactate, Blood: 0.9 mmol/L (22 @ 14:04)      CARDIAC MARKERS ( 2022 14:09 )  x     / 0.01 ng/mL / x     / x     / x          RADIOLOGY:   Xray Chest 1 View-PORTABLE IMMEDIATE (22 @ 13:15) >  1.  Bilateral perihilar opacities.  2.  Small right pleural effusion      PHYSICAL EXAM:  GENERAL: NAD, well-developed, AAOx2 to self and place  HEENT:  Atraumatic, Normocephalic. EOMI  PULMONARY: Ronchi noted on exam in apexes and bases  CARDIOVASCULAR: Regular rate and rhythm; No murmurs, rubs, or gallops  GASTROINTESTINAL: Soft, Nontender, Nondistended; Bowel sounds present  MUSCULOSKELETAL:  2+ Peripheral Pulses, No clubbing, cyanosis, or edema  NEUROLOGY: non-focal, normal sensory and movement  SKIN: No rashes or lesions      ADMISSION SUMMARY  Patient is a 89y old Female who presents with a chief complaint of COVID (2022 16:58)  Currently admitted to medicine with the primary diagnosis of COVID-19    ASSESSMENT & PLAN  88 yo F with PMH of dementia, HTN, HLD, type B aortic dissection 2017, AAA 4.4 cm, CAD, CKD, DVTs previously on Eliquis presenting for 2 days of cough productive of yellow sputum, fever 100.6 yesterday, and SOB, admitted for acute hypoxic respiratory failure 2/2 COVID. Patient was improved on examination and RDV could be discontinued so patient could return home.    #Acute hypoxic respiratory failure 2/2 COVID-19 PNA  - COVID positive on  per family  - Initially on 2 L NC > now on RA saturating 90-92%  - D-dimer 1000. LE duplex - no signs of thrombosis  - Lovenox 0.5 mg/kg BID, Cr clearance of 29  - Patient tolerating off of oxygen, saturation at 95%  - Patient on 6 mg Dexamethasone  - Patient on day 4 f 5 of RDV treatment    #CAD  #Hx of grade I diastolic heart failure; r/o worsening CHF  - TTE 2018 grade I DD, normal systolic function  - Not volume overloaded   - BNP 3585    #CKD  - At baseline, monitor    #Hx of DVT  - Years ago. Daughter said she had multiple DVTs and was seeing a hematologist who stopped AC    #Hx of type B Aortic dissection, AAA   #HTN  - Cont Losartan    #HLD  - Not on statin currently    #Dementia, depression  - Cont memantine, lamotrigine    #Code  - Patient's daughter confirmed DNR/DNI over phone, MOLST filled out     DVT Prophylaxis: Lovenox 0.5 mg/kg BID  GI Prophylaxis: Not indicated  Diet: DASH  Activity: PT consult, out of bed   SCAR DING 89y Female  MRN#: [id number]   Code: DNR/DNI    SUBJECTIVE  Patient is a 89y old Female who presents with a chief complaint of COVID (2022 16:58)  Currently admitted to medicine with the primary diagnosis of COVID-19    Today is hospital day 4, and this morning she appeared well on exam and reported congestion but no other complaints. Patient was alert and oriented x2, to self and location (year was close, reported 2020). No acute events overnight.    OBJECTIVE  PAST MEDICAL & SURGICAL HISTORY  Hypertension, unspecified type  History of deep vein thrombosis  Thoracic aortic dissection  Dementia  Dyslipidemia  H/O partial thyroidectomy  H/O rotator cuff tear  Status post hip surgery      ALLERGIES:  NSAIDs (Hives; Urticaria)    MEDICATIONS:  STANDING MEDICATIONS  chlorhexidine 4% Liquid 1 Application(s) Topical <User Schedule>  enoxaparin Injectable 40 milliGRAM(s) SubCutaneous every 12 hours  furosemide    Tablet 20 milliGRAM(s) Oral daily  labetalol 50 milliGRAM(s) Oral two times a day  lamoTRIgine 100 milliGRAM(s) Oral at bedtime  lisinopril 5 milliGRAM(s) Oral daily  lisinopril 10 milliGRAM(s) Oral at bedtime  memantine 10 milliGRAM(s) Oral at bedtime  memantine 5 milliGRAM(s) Oral daily    PRN MEDICATIONS  melatonin 3 milliGRAM(s) Oral at bedtime PRN      VITAL SIGNS: Last 24 Hours  Vital Signs Last 24 Hrs  T(C): 36.4 (2022 05:01), Max: 36.4 (2022 05:01)  T(F): 97.5 (2022 05:01), Max: 97.5 (2022 05:01)  HR: 54 (2022 05:01) (54 - 73)  BP: 186/78 (2022 05:01) (143/72 - 195/84)  BP(mean): 99 (2022 20:00) (95 - 120)  RR: 18 (2022 05:01) (18 - 18)  SpO2: 95% (2022 20:00) (93% - 95%)    Parameters below as of 2022 12:52  Patient On (Oxygen Delivery Method): room air      LABS:                        14.9   9.03  )-----------( 201      ( 2022 06:57 )             44.4         137  |  101  |  57<H>  ----------------------------<  114<H>  4.2   |  21  |  1.6<H>    Ca    9.1      2022 06:57  Mg     1.8         TPro  6.2  /  Alb  3.7  /  TBili  0.4  /  DBili  x   /  AST  16  /  ALT  14  /  AlkPhos  70          Urinalysis Basic - ( 2022 18:20 )  Color: Light Yellow / Appearance: Clear / S.017 / pH: x  Gluc: x / Ketone: Trace  / Bili: Negative / Urobili: <2 mg/dL   Blood: x / Protein: Trace / Nitrite: Negative   Leuk Esterase: Negative / RBC: x / WBC x   Sq Epi: x / Non Sq Epi: x / Bacteria: x        Troponin T, Serum: 0.01 ng/mL (22 @ 14:09)  Lactate, Blood: 0.9 mmol/L (22 @ 14:04)      CARDIAC MARKERS ( 2022 14:09 )  x     / 0.01 ng/mL / x     / x     / x          RADIOLOGY:   Xray Chest 1 View-PORTABLE IMMEDIATE (22 @ 13:15) >  1.  Bilateral perihilar opacities.  2.  Small right pleural effusion      PHYSICAL EXAM:  GENERAL: NAD, well-developed, AAOx2 to self and place  HEENT:  Atraumatic, Normocephalic. EOMI  PULMONARY: Ronchi noted on exam in apexes and bases  CARDIOVASCULAR: Regular rate and rhythm; No murmurs, rubs, or gallops  GASTROINTESTINAL: Soft, Nontender, Nondistended; Bowel sounds present  MUSCULOSKELETAL:  2+ Peripheral Pulses, No clubbing, cyanosis, or edema  NEUROLOGY: non-focal, normal sensory and movement  SKIN: No rashes or lesions      ADMISSION SUMMARY  Patient is a 89y old Female who presents with a chief complaint of COVID (2022 16:58)  Currently admitted to medicine with the primary diagnosis of COVID-19    ASSESSMENT & PLAN  88 yo F with PMH of dementia, HTN, HLD, type B aortic dissection 2017, AAA 4.4 cm, CAD, CKD, DVTs previously on Eliquis presenting for 2 days of cough productive of yellow sputum, fever 100.6 yesterday, and SOB, admitted for acute hypoxic respiratory failure 2/2 COVID. Patient was improved on examination and RDV could be discontinued so patient could return home.    #Acute hypoxic respiratory failure 2/2 COVID-19 PNA  - COVID positive on  per family  - Initially on 2 L NC > now on RA saturating 90-92%  - D-dimer 1000. LE duplex - no signs of thrombosis  - Lovenox 0.5 mg/kg BID, Cr clearance of 29  - Patient tolerating off of oxygen, saturation at 95%  - Patient on 6 mg Dexamethasone, can be stopped and patient can be finished on home Prednisone  - Patient on day 4 of 5 of RDV treatment, 4th dose can be given earlier on day 4 and 5th dose can be held    #CAD  #Hx of grade I diastolic heart failure; r/o worsening CHF  - TTE  grade I DD, normal systolic function  - Not volume overloaded   - BNP 3585    #CKD  - At baseline, monitor    #Hx of DVT  - Years ago. Daughter said she had multiple DVTs and was seeing a hematologist who stopped AC  - Due to COVID and risk of DVT, patient was sent home with Xarelto for recommended AC    #Hx of type B Aortic dissection, AAA   #HTN  - Cont Losartan    #HLD  - Not on statin currently    #Dementia, depression  - Cont memantine, lamotrigine    #Code  - Patient's daughter confirmed DNR/DNI over phone, MOLST filled out     DVT Prophylaxis: Lovenox 0.5 mg/kg BID  GI Prophylaxis: Not indicated  Diet: DASH  Activity: PT consult, out of bed

## 2022-07-22 NOTE — PROGRESS NOTE ADULT - TIME BILLING
Counseled staff  about diagnostic testing and treatment plan. All questions answered.
direct patient care and chart review   -coordinated current plan of care with medical staff on MDR
direct patient care and chart review   -coordinated current plan of care with medical staff on MDR
direct patient care and chart review   -coordinated current plan of care with medical staff on MDR  -d/c papers edited by me

## 2022-07-22 NOTE — PROGRESS NOTE ADULT - ATTENDING COMMENTS
patient seen and examined at bedside independently of medical resident and agree with the note unless otherwise stated     Vital Signs Last 24 Hrs  T(C): 36.2 (20 Jul 2022 04:52), Max: 36.7 (19 Jul 2022 20:46)  T(F): 97.2 (20 Jul 2022 04:52), Max: 98.1 (19 Jul 2022 20:46)  HR: 51 (20 Jul 2022 08:00) (51 - 80)  BP: 159/72 (20 Jul 2022 08:00) (101/70 - 171/74)  BP(mean): -RR: 18 (20 Jul 2022 08:00) (18 - 18)  SpO2: 96% (20 Jul 2022 08:00) (95% - 96%)                          13.1   13.21 )-----------( 170      ( 20 Jul 2022 07:41 )             40.1   07-20    139  |  104  |  41<H>  ----------------------------<  127<H>  4.2   |  22  |  1.3    Ca    9.5      20 Jul 2022 07:41  Mg     1.8     07-20    TPro  6.0  /  Alb  3.8  /  TBili  0.3  /  DBili  0.2  /  AST  15  /  ALT  13  /  AlkPhos  66  07-20      # Resolved ARF (on RA)  # COVID - 19 Infection with severe illness   # Acute UTI (E-coli)  # HTN/DL  # Senile Dementia  # Obesity   # Suspected CKD stage III     -currently on maintenance dose of RDV  -c/w Dex IV 10 day course  -Add IV Rocephin; follow up official urine culture sensitivities  -oob to chair as tolerated       # Progress Note Handoff  PENDING as follows  consults: ID follow up   Test: urine cultures   Family discussion: discussed with patient - answered all questions   Disposition: not discharge ready - home with home care once clinically stable    Attending Physician Dr. Danae Ferrer # [id number]
patient seen and examined at bedside independently of medical resident and agree with the note unless otherwise stated     Vital Signs Last 24 Hrs  T(C): 36.4 (22 Jul 2022 12:45), Max: 36.4 (22 Jul 2022 05:01)  T(F): 97.6 (22 Jul 2022 12:45), Max: 97.6 (22 Jul 2022 12:45)  HR: 58 (22 Jul 2022 12:45) (54 - 73)  BP: 145/76 (22 Jul 2022 12:45) (140/74 - 186/78)  BP(mean): 99 (21 Jul 2022 20:00) (98 - 99)  RR: 18 (22 Jul 2022 12:45) (18 - 20)  SpO2: 95% (22 Jul 2022 12:45) (95% - 95%)    Parameters below as of 22 Jul 2022 12:45  Patient On (Oxygen Delivery Method): room air                                   14.9   9.03  )-----------( 201      ( 22 Jul 2022 06:57 )             44.4   07-22    137  |  101  |  57<H>  ----------------------------<  114<H>  4.2   |  21  |  1.6<H>    Ca    9.1      22 Jul 2022 06:57  Mg     1.8     07-22    TPro  6.2  /  Alb  3.7  /  TBili  0.4  /  DBili  x   /  AST  16  /  ALT  14  /  AlkPhos  70  07-22           # Resolved ARF (on RA)  # COVID - 19 Infection with severe illness   # Acute UTI (E-coli)  # HTN/DL  # Senile Dementia  # Obesity   # Suspected CKD stage III     -switch to oral prednisone dose   -discussed urine cultures with daughter - asymptomatic and wants to avoid use of abx (Afebrile and no leukocytosis)  -oob to chair as tolerated       # Progress Note Handoff  PENDING as follows  Family discussion: family requesting early morning discharge   Disposition: home today    Attending Physician Dr. Danae Ferrer # [id number]
patient seen and examined at bedside independently of medical resident and agree with the note unless otherwise stated     Vital Signs Last 24 Hrs  T(C): 36 (21 Jul 2022 12:52), Max: 36.6 (20 Jul 2022 20:40)  T(F): 96.8 (21 Jul 2022 12:52), Max: 97.9 (20 Jul 2022 20:40)  HR: 73 (21 Jul 2022 12:52) (52 - 73)  BP: 151/79 (21 Jul 2022 12:52) (151/73 - 195/84)  BP(mean): 107 (21 Jul 2022 12:52) (95 - 120)  RR: 18 (21 Jul 2022 12:52) (18 - 18)  SpO2: 93% (21 Jul 2022 12:52) (93% - 95%)    Parameters below as of 21 Jul 2022 12:52  Patient On (Oxygen Delivery Method): room air                            14.2   10.26 )-----------( 181      ( 21 Jul 2022 09:11 )             42.5   07-21    139  |  102  |  50<H>  ----------------------------<  136<H>  4.4   |  22  |  1.4    Ca    9.4      21 Jul 2022 09:11  Mg     1.8     07-21    TPro  6.3  /  Alb  3.8  /  TBili  0.3  /  DBili  <0.2  /  AST  15  /  ALT  12  /  AlkPhos  70  07-21        # Resolved ARF (on RA)  # COVID - 19 Infection with severe illness   # Acute UTI (E-coli)  # HTN/DL  # Senile Dementia  # Obesity   # Suspected CKD stage III     -currently on maintenance dose of RDV and Dexa  -will switch to oral prednisone tomorrow upon discharge   -discussed urine cultures with daughter - asymptomatic and wants to avoid use of abx (Afebrile and no leukocytosis)  -oob to chair as tolerated       # Progress Note Handoff  PENDING as follows  Family discussion: discussed with patient - answered all questions; updated daughter Camelia; answered all questions, aware of the current lab findings, plan of care and requesting discharge tomorrow if clinically stable   Disposition: home tomorrow    Attending Physician Dr. Danae Ferrer # [id number]

## 2022-07-23 LAB
CULTURE RESULTS: SIGNIFICANT CHANGE UP
CULTURE RESULTS: SIGNIFICANT CHANGE UP
SPECIMEN SOURCE: SIGNIFICANT CHANGE UP
SPECIMEN SOURCE: SIGNIFICANT CHANGE UP

## 2022-08-02 DIAGNOSIS — U07.1 COVID-19: ICD-10-CM

## 2022-08-02 DIAGNOSIS — I25.10 ATHEROSCLEROTIC HEART DISEASE OF NATIVE CORONARY ARTERY WITHOUT ANGINA PECTORIS: ICD-10-CM

## 2022-08-02 DIAGNOSIS — Z66 DO NOT RESUSCITATE: ICD-10-CM

## 2022-08-02 DIAGNOSIS — N18.30 CHRONIC KIDNEY DISEASE, STAGE 3 UNSPECIFIED: ICD-10-CM

## 2022-08-02 DIAGNOSIS — F03.90 UNSPECIFIED DEMENTIA WITHOUT BEHAVIORAL DISTURBANCE: ICD-10-CM

## 2022-08-02 DIAGNOSIS — I13.0 HYPERTENSIVE HEART AND CHRONIC KIDNEY DISEASE WITH HEART FAILURE AND STAGE 1 THROUGH STAGE 4 CHRONIC KIDNEY DISEASE, OR UNSPECIFIED CHRONIC KIDNEY DISEASE: ICD-10-CM

## 2022-08-02 DIAGNOSIS — N39.0 URINARY TRACT INFECTION, SITE NOT SPECIFIED: ICD-10-CM

## 2022-08-02 DIAGNOSIS — B96.20 UNSPECIFIED ESCHERICHIA COLI [E. COLI] AS THE CAUSE OF DISEASES CLASSIFIED ELSEWHERE: ICD-10-CM

## 2022-08-02 DIAGNOSIS — J96.01 ACUTE RESPIRATORY FAILURE WITH HYPOXIA: ICD-10-CM

## 2022-08-02 DIAGNOSIS — J12.82 PNEUMONIA DUE TO CORONAVIRUS DISEASE 2019: ICD-10-CM

## 2022-08-02 DIAGNOSIS — Z86.718 PERSONAL HISTORY OF OTHER VENOUS THROMBOSIS AND EMBOLISM: ICD-10-CM

## 2022-08-02 DIAGNOSIS — I50.30 UNSPECIFIED DIASTOLIC (CONGESTIVE) HEART FAILURE: ICD-10-CM

## 2022-08-02 DIAGNOSIS — E66.9 OBESITY, UNSPECIFIED: ICD-10-CM

## 2022-09-06 ENCOUNTER — NON-APPOINTMENT (OUTPATIENT)
Age: 87
End: 2022-09-06

## 2022-09-19 ENCOUNTER — RX RENEWAL (OUTPATIENT)
Age: 87
End: 2022-09-19

## 2022-10-06 ENCOUNTER — RX RENEWAL (OUTPATIENT)
Age: 87
End: 2022-10-06

## 2023-01-03 ENCOUNTER — APPOINTMENT (OUTPATIENT)
Dept: NEUROLOGY | Facility: CLINIC | Age: 88
End: 2023-01-03

## 2023-01-23 ENCOUNTER — APPOINTMENT (OUTPATIENT)
Dept: NEUROLOGY | Facility: CLINIC | Age: 88
End: 2023-01-23

## 2023-01-23 NOTE — REASON FOR VISIT
[Home] : at home, [unfilled] , at the time of the visit. [Patient] : the patient [Follow-Up: _____] : a [unfilled] follow-up visit [Spouse] : spouse

## 2023-02-21 ENCOUNTER — RX RENEWAL (OUTPATIENT)
Age: 88
End: 2023-02-21

## 2023-02-21 ENCOUNTER — APPOINTMENT (OUTPATIENT)
Dept: NEUROLOGY | Facility: CLINIC | Age: 88
End: 2023-02-21
Payer: MEDICARE

## 2023-02-21 DIAGNOSIS — F03.90 UNSPECIFIED DEMENTIA W/OUT BEHAVIORAL DISTURBANCE: ICD-10-CM

## 2023-02-21 DIAGNOSIS — F32.A DEPRESSION, UNSPECIFIED: ICD-10-CM

## 2023-02-21 PROCEDURE — 99214 OFFICE O/P EST MOD 30 MIN: CPT | Mod: 95

## 2023-02-21 RX ORDER — LAMOTRIGINE 100 MG/1
100 TABLET ORAL
Qty: 90 | Refills: 1 | Status: DISCONTINUED | COMMUNITY
Start: 2019-07-23 | End: 2023-02-21

## 2023-02-22 PROBLEM — F03.90 DEMENTIA: Status: ACTIVE | Noted: 2018-05-17

## 2023-02-22 PROBLEM — F32.A DEPRESSION: Status: ACTIVE | Noted: 2019-11-20

## 2023-02-23 NOTE — DISCUSSION/SUMMARY
[FreeTextEntry1] : Patient with  Dementia and depression with gait imbalance. Last MRI brain 2015 showing advanced microvascular ischemic disease and mild cerebellar atrophy. \par \par Continues to do well. No significant progression. Tolerating current medication regimen. Discussed importance of maintaining a schedule and keeping patietn engaged in activity's and family gatherings as well as adequate nutrition, exercise when tolerated and adequate sleep. \par \par At this time we recommend the following: \par -continue Deplin 25mg\par -continue LTG 100mg in am \par -continue Namenda 5-10mg\par \par All questions and concerns were addressed to the best of my ability. Emotional support was rendered.

## 2023-02-23 NOTE — HISTORY OF PRESENT ILLNESS
[FreeTextEntry1] : Ms. DING presents today for a follow up visit of  Dementia and depression with gait imbalance. Last MRI brain 2015 showing advanced microvascular ischemic disease and mild cerebellar atrophy. \par \par Since last seen patient and family deny any significant progression of symptoms. Short term memory and mood are stable but can fluctuate. Patient does not go out as much due to disinterest and cold weather.  Family tries to take her out when they can and patietn enjoys being with her great grandchildren. \par \par Continues to be on Deplin 15mg, Namenda 5-10mg and LTG 100mg daily without side effects.\par \par Reports sleeping well. She denies any new neurological complaints at this time.\par \par Current meds: \par Labetalol 50mg BID\par Lisinopril 5-10mg\par Namenda 5-10mg\par Deplin 15mg\par LTG 100mg\par Lasix 20mg 2x week

## 2023-03-07 ENCOUNTER — RX RENEWAL (OUTPATIENT)
Age: 88
End: 2023-03-07

## 2023-04-15 NOTE — PROVIDER CONTACT NOTE (EICU) - RECOMMENDATIONS
Problem: At Risk for Falls  Goal: # Patient does not fall  Outcome: Outcome Met, Complete Goal  Goal: # Takes action to control fall-related risks  Outcome: Outcome Met, Complete Goal  Goal: # Verbalizes understanding of fall risk/precautions  Description: Document education using the patient education activity  Outcome: Outcome Met, Complete Goal     Problem: Pain  Goal: #Acceptable pain level achieved/maintained at rest using NRS/Faces  Description: This goal is used for patients who can self-report.  Acceptable means the level is at or below the identified comfort/function goal.  Outcome: Outcome Met, Complete Goal  Goal: # Acceptable pain level achieved/maintained at rest using NRS/Faces without oversedation (opioid naive or PCA/Epidural infusion)  Description: This goal is used if Opioid-naïve or on PCA/Epidural Infusion.  Outcome: Outcome Met, Complete Goal  Goal: Acceptable pain/comfort level is achieved/maintained at rest (based on Pain Behaviors Scale)  Description: This goal is used for patients who are not able to self-report pain and are assessed for pain using the Pain Behaviors Scale  Outcome: Outcome Met, Complete Goal  Goal: # Verbalizes understanding of pain management  Description: Documented in Patient Education Activity  Outcome: Outcome Met, Complete Goal     
  Problem: At Risk for Falls  Goal: # Patient does not fall  Outcome: Outcome Met, Continue evaluating goal progress toward completion  Goal: # Takes action to control fall-related risks  Outcome: Outcome Met, Continue evaluating goal progress toward completion  Goal: # Verbalizes understanding of fall risk/precautions  Description: Document education using the patient education activity  Outcome: Outcome Met, Continue evaluating goal progress toward completion     Problem: Pain  Goal: #Acceptable pain level achieved/maintained at rest using NRS/Faces  Description: This goal is used for patients who can self-report.  Acceptable means the level is at or below the identified comfort/function goal.  Outcome: Outcome Met, Continue evaluating goal progress toward completion  Goal: # Acceptable pain level achieved/maintained at rest using NRS/Faces without oversedation (opioid naive or PCA/Epidural infusion)  Description: This goal is used if Opioid-naïve or on PCA/Epidural Infusion.  Outcome: Outcome Met, Continue evaluating goal progress toward completion  Goal: Acceptable pain/comfort level is achieved/maintained at rest (based on Pain Behaviors Scale)  Description: This goal is used for patients who are not able to self-report pain and are assessed for pain using the Pain Behaviors Scale  Outcome: Outcome Met, Continue evaluating goal progress toward completion  Goal: # Verbalizes understanding of pain management  Description: Documented in Patient Education Activity  Outcome: Outcome Met, Continue evaluating goal progress toward completion     
  Problem: At Risk for Falls  Goal: # Patient does not fall  Outcome: Outcome Not Met, Continue to Monitor  Goal: # Takes action to control fall-related risks  Outcome: Outcome Not Met, Continue to Monitor  Goal: # Verbalizes understanding of fall risk/precautions  Description: Document education using the patient education activity  Outcome: Outcome Not Met, Continue to Monitor     Problem: Pain  Goal: #Acceptable pain level achieved/maintained at rest using NRS/Faces  Description: This goal is used for patients who can self-report.  Acceptable means the level is at or below the identified comfort/function goal.  Outcome: Outcome Not Met, Continue to Monitor  Goal: # Acceptable pain level achieved/maintained at rest using NRS/Faces without oversedation (opioid naive or PCA/Epidural infusion)  Description: This goal is used if Opioid-naïve or on PCA/Epidural Infusion.  Outcome: Outcome Not Met, Continue to Monitor  Goal: Acceptable pain/comfort level is achieved/maintained at rest (based on Pain Behaviors Scale)  Description: This goal is used for patients who are not able to self-report pain and are assessed for pain using the Pain Behaviors Scale  Outcome: Outcome Not Met, Continue to Monitor  Goal: # Verbalizes understanding of pain management  Description: Documented in Patient Education Activity  Outcome: Outcome Not Met, Continue to Monitor     
No additional interventions continue tylenol as ordered with heat and ice packs alternated and change postion q 2 hrs

## 2023-05-09 ENCOUNTER — NON-APPOINTMENT (OUTPATIENT)
Age: 88
End: 2023-05-09

## 2023-05-11 ENCOUNTER — NON-APPOINTMENT (OUTPATIENT)
Age: 88
End: 2023-05-11

## 2023-07-07 ENCOUNTER — RX RENEWAL (OUTPATIENT)
Age: 88
End: 2023-07-07

## 2023-12-11 ENCOUNTER — RX RENEWAL (OUTPATIENT)
Age: 88
End: 2023-12-11

## 2023-12-12 ENCOUNTER — RX RENEWAL (OUTPATIENT)
Age: 88
End: 2023-12-12

## 2024-02-07 NOTE — H&P ADULT - NSHPPHYSICALEXAM_GEN_ALL_CORE
General: NAD, lying in bed comfortably  Mental status: AAOx2-3  Head:  Atraumatic  Eyes: EOMI, conjunctiva and sclera clear  ENT: Moist mucous membranes  Neck: No JVD  Chest/lung: b/l coarse breath sounds   Heart: Regular rate and rhythm; No murmurs, rubs, or gallops  Abdomen: Bowel sounds present; Soft, Nontender, Nondistended  Extremities:  2+ Peripheral Pulses. b/l nonpitting edema Walking

## 2024-03-19 ENCOUNTER — RX RENEWAL (OUTPATIENT)
Age: 89
End: 2024-03-19

## 2024-03-25 ENCOUNTER — APPOINTMENT (OUTPATIENT)
Dept: NEUROLOGY | Facility: CLINIC | Age: 89
End: 2024-03-25
Payer: MEDICARE

## 2024-03-25 DIAGNOSIS — I63.9 CEREBRAL INFARCTION, UNSPECIFIED: ICD-10-CM

## 2024-03-25 PROCEDURE — 99447 NTRPROF PH1/NTRNET/EHR 11-20: CPT

## 2024-03-25 PROCEDURE — G2211 COMPLEX E/M VISIT ADD ON: CPT

## 2024-03-25 RX ORDER — APIXABAN 2.5 MG/1
2.5 TABLET, FILM COATED ORAL
Refills: 0 | Status: ACTIVE | COMMUNITY
Start: 2024-03-25

## 2024-03-25 RX ORDER — LABETALOL HYDROCHLORIDE 100 MG/1
100 TABLET, FILM COATED ORAL
Refills: 0 | Status: ACTIVE | COMMUNITY

## 2024-03-25 RX ORDER — MEMANTINE HYDROCHLORIDE 5 MG/1
5 TABLET, FILM COATED ORAL
Qty: 270 | Refills: 1 | Status: DISCONTINUED | COMMUNITY
Start: 2021-12-20 | End: 2024-03-25

## 2024-03-25 RX ORDER — INFLUENZA A VIRUS A/VICTORIA/4897/2022 IVR-238 (H1N1) ANTIGEN (FORMALDEHYDE INACTIVATED), INFLUENZA A VIRUS A/DARWIN/9/2021 SAN-010 (H3N2) ANTIGEN (FORMALDEHYDE INACTIVATED), INFLUENZA B VIRUS B/PHUKET/3073/2013 ANTIGEN (FORMALDEHYDE INACTIVATED), AND INFLUENZA B VIRUS B/MICHIGAN/01/2021 ANTIGEN (FORMALDEHYDE INACTIVATED) 60; 60; 60; 60 UG/.7ML; UG/.7ML; UG/.7ML; UG/.7ML
0.7 INJECTION, SUSPENSION INTRAMUSCULAR
Qty: 1 | Refills: 0 | Status: DISCONTINUED | COMMUNITY
Start: 2022-12-11 | End: 2024-03-25

## 2024-03-25 RX ORDER — LISINOPRIL 10 MG/1
10 TABLET ORAL
Refills: 0 | Status: ACTIVE | COMMUNITY

## 2024-03-25 RX ORDER — FUROSEMIDE 20 MG/1
20 TABLET ORAL
Refills: 0 | Status: ACTIVE | COMMUNITY

## 2024-03-25 NOTE — REASON FOR VISIT
[Home] : at home, [unfilled] , at the time of the visit. [Other:____] : [unfilled] [Patient] : the patient [Follow-Up: _____] : a [unfilled] follow-up visit

## 2024-03-25 NOTE — HISTORY OF PRESENT ILLNESS
[FreeTextEntry1] : Ms. DING presents today for a follow up visit Dementia and depression with gait imbalance. Last MRI brain 2015 showing advanced microvascular ischemic disease and mild cerebellar atrophy.   Daughter reports patient likely had a stroke on 1/1/24. Reports patient waking up from sleep with left sided weakness and slight left sided facial droop. As a family they elected not to take her to the hospital as they felt would be a major set back as it has been in the past. She was examined at home by her son in law who is a physician. She has a history of DVT and was previously on Eliquis. Family restarted Eliquis at 2.5mg daily in collaboration with a PCP who is also a family friend. Daughter reports that she has been completely back to baseline about 1 month later. She recently completed a course of PT/OT and speech (daughter felt she had very mild slurred speech).   Doing well at this time, otherwise stable. Daughter reports she follows with PCP via telemedicine visits. She does no leave the house much given it is very difficulty to get her out of the house with her mobility status.

## 2024-03-25 NOTE — DISCUSSION/SUMMARY
[FreeTextEntry1] : Dementia and depression with gait imbalance. Last MRI brain 2015 showing advanced microvascular ischemic disease and mild cerebellar atrophy.   Likely had stroke in January with left sided weakness, slight left facial droop and mildly slurred speech. Family elected not to seek medial attention and managed symptoms at home by restarting Eliquis, PT/OT and speech therapies. Report she has completed returned to baseline about 1 month after the event. Counseling that patient should of went to the ER for proper diagnosis, treatment and prevemtion. Refusing outpatient stroke work up. Stated they will continue to follow with PCP.   Instructed to go to the nearest ED if symptom reoccur or patient has other changes. Camelia verbalized understanding. All questions and concerns were addressed to the best of my ability. Emotional support was rendered.

## 2024-04-01 ENCOUNTER — RX RENEWAL (OUTPATIENT)
Age: 89
End: 2024-04-01

## 2024-04-01 RX ORDER — MEMANTINE HYDROCHLORIDE 10 MG/1
10 TABLET, FILM COATED ORAL
Qty: 180 | Refills: 1 | Status: ACTIVE | COMMUNITY
Start: 2019-10-31 | End: 1900-01-01

## 2024-05-01 NOTE — PHYSICAL THERAPY INITIAL EVALUATION ADULT - GENERAL OBSERVATIONS, REHAB EVAL
Risks, benefits and alternatives discussed; including but not limited to headache, nausea, bleeding, infection and local trauma/positioning related injury. All questions answered. The patient and HCP agrees to proceed.
pt. encountered in bed in NAD +IV locked by RN prior to session. pt soiled, cleaned by RN prior to OOB, [phone]

## 2024-05-02 ENCOUNTER — RX RENEWAL (OUTPATIENT)
Age: 89
End: 2024-05-02

## 2024-05-31 NOTE — ED ADULT TRIAGE NOTE - BP NONINVASIVE SYSTOLIC (MM HG)
142 Mode of arrival (squad #, walk in, police, etc) : EMS        Chief complaint(s): chest pain        Arrival Note (brief scenario, treatment PTA, etc).: Patient presents via squad with sudden onset  of mid- sternal intermittent chest heaviness, radiating to left arm. Patient also has nausea and vomiting. EMS gave Nitroglycerin 1 dose, ASA 4 tabs, Zofran 4 mg oral.           C= \"Have you ever felt that you should Cut down on your drinking?\"  No  A= \"Have people Annoyed you by criticizing your drinking?\"  No  G= \"Have you ever felt bad or Guilty about your drinking?\"  No  E= \"Have you ever had a drink as an Eye-opener first thing in the morning to steady your nerves or to help a hangover?\"  No      Deferred []      Reason for deferring: N/A    *If yes to two or more: probable alcohol abuse.*

## 2024-06-06 RX ORDER — LAMOTRIGINE 100 MG/1
100 TABLET ORAL
Qty: 90 | Refills: 1 | Status: ACTIVE | COMMUNITY
Start: 2019-10-28 | End: 1900-01-01

## 2024-06-19 ENCOUNTER — RX RENEWAL (OUTPATIENT)
Age: 89
End: 2024-06-19

## 2024-06-19 RX ORDER — LEVOMEFOLATE/ALGAL OIL 15-90.314
15-90.314 CAPSULE ORAL
Qty: 90 | Refills: 0 | Status: ACTIVE | COMMUNITY
Start: 2022-06-13 | End: 1900-01-01

## 2024-09-24 ENCOUNTER — RX RENEWAL (OUTPATIENT)
Age: 89
End: 2024-09-24

## 2024-10-15 ENCOUNTER — RX RENEWAL (OUTPATIENT)
Age: 89
End: 2024-10-15

## 2024-12-11 ENCOUNTER — RX RENEWAL (OUTPATIENT)
Age: 88
End: 2024-12-11

## 2025-01-21 ENCOUNTER — RX RENEWAL (OUTPATIENT)
Age: 89
End: 2025-01-21

## 2025-04-01 ENCOUNTER — RX RENEWAL (OUTPATIENT)
Age: 89
End: 2025-04-01

## 2025-05-22 ENCOUNTER — RX RENEWAL (OUTPATIENT)
Age: 89
End: 2025-05-22

## 2025-05-22 RX ORDER — LEVOMEFOLATE CALCIUM 15 MG
15 CAPSULE ORAL
Qty: 90 | Refills: 0 | Status: ACTIVE | COMMUNITY
Start: 2025-05-22 | End: 1900-01-01

## 2025-06-27 ENCOUNTER — RX RENEWAL (OUTPATIENT)
Age: 89
End: 2025-06-27

## 2025-07-30 ENCOUNTER — APPOINTMENT (OUTPATIENT)
Dept: NEUROLOGY | Facility: CLINIC | Age: 89
End: 2025-07-30
Payer: MEDICARE

## 2025-07-30 DIAGNOSIS — F32.A DEPRESSION, UNSPECIFIED: ICD-10-CM

## 2025-07-30 PROCEDURE — G2211 COMPLEX E/M VISIT ADD ON: CPT | Mod: 93

## 2025-07-30 PROCEDURE — 99213 OFFICE O/P EST LOW 20 MIN: CPT | Mod: 93

## 2025-08-13 ENCOUNTER — RX RENEWAL (OUTPATIENT)
Age: 89
End: 2025-08-13